# Patient Record
Sex: FEMALE | Race: BLACK OR AFRICAN AMERICAN | HISPANIC OR LATINO | Employment: FULL TIME | URBAN - METROPOLITAN AREA
[De-identification: names, ages, dates, MRNs, and addresses within clinical notes are randomized per-mention and may not be internally consistent; named-entity substitution may affect disease eponyms.]

---

## 2017-09-13 ENCOUNTER — CONVERSION ENCOUNTER (OUTPATIENT)
Dept: MAMMOGRAPHY | Facility: CLINIC | Age: 67
End: 2017-09-13

## 2018-08-27 ENCOUNTER — OFFICE VISIT (OUTPATIENT)
Dept: FAMILY MEDICINE CLINIC | Facility: CLINIC | Age: 68
End: 2018-08-27
Payer: COMMERCIAL

## 2018-08-27 VITALS
RESPIRATION RATE: 16 BRPM | TEMPERATURE: 96.6 F | DIASTOLIC BLOOD PRESSURE: 80 MMHG | WEIGHT: 167 LBS | BODY MASS INDEX: 28.51 KG/M2 | HEIGHT: 64 IN | SYSTOLIC BLOOD PRESSURE: 130 MMHG | OXYGEN SATURATION: 98 % | HEART RATE: 82 BPM

## 2018-08-27 DIAGNOSIS — I10 ESSENTIAL HYPERTENSION: Primary | ICD-10-CM

## 2018-08-27 DIAGNOSIS — E78.2 MIXED HYPERLIPIDEMIA: ICD-10-CM

## 2018-08-27 DIAGNOSIS — M85.80 OSTEOPENIA DETERMINED BY X-RAY: ICD-10-CM

## 2018-08-27 PROBLEM — E66.3 OVERWEIGHT (BMI 25.0-29.9): Status: ACTIVE | Noted: 2018-08-27

## 2018-08-27 PROCEDURE — 99213 OFFICE O/P EST LOW 20 MIN: CPT | Performed by: INTERNAL MEDICINE

## 2018-08-27 RX ORDER — HYDROCHLOROTHIAZIDE 25 MG/1
25 TABLET ORAL DAILY
Qty: 180 TABLET | Refills: 0 | Status: SHIPPED | OUTPATIENT
Start: 2018-08-27 | End: 2019-03-12 | Stop reason: SDUPTHER

## 2018-08-27 RX ORDER — ATORVASTATIN CALCIUM 20 MG/1
20 TABLET, FILM COATED ORAL DAILY
Qty: 180 TABLET | Refills: 0 | Status: SHIPPED | OUTPATIENT
Start: 2018-08-27 | End: 2019-05-02 | Stop reason: SDUPTHER

## 2018-08-27 RX ORDER — CLOPIDOGREL BISULFATE 75 MG/1
75 TABLET ORAL DAILY
Qty: 180 TABLET | Refills: 0 | Status: SHIPPED | OUTPATIENT
Start: 2018-08-27 | End: 2019-03-12 | Stop reason: SDUPTHER

## 2018-08-27 RX ORDER — LOSARTAN POTASSIUM 100 MG/1
100 TABLET ORAL DAILY
Qty: 180 TABLET | Refills: 0 | Status: SHIPPED | OUTPATIENT
Start: 2018-08-27 | End: 2019-05-02 | Stop reason: SDUPTHER

## 2018-08-27 RX ORDER — BLOOD PRESSURE TEST KIT
KIT MISCELLANEOUS
COMMUNITY
Start: 2017-01-05 | End: 2019-01-28 | Stop reason: SDUPTHER

## 2018-08-27 RX ORDER — AMLODIPINE BESYLATE 5 MG/1
5 TABLET ORAL DAILY
Qty: 180 TABLET | Refills: 0 | Status: SHIPPED | OUTPATIENT
Start: 2018-08-27 | End: 2019-01-28 | Stop reason: SDUPTHER

## 2018-08-27 NOTE — PATIENT INSTRUCTIONS
Hipertensión crónica   CUIDADO AMBULATORIO:   Hipertensión  es la presión arterial rama  La presión arterial es la fuerza que ejerce la kristian contra las ackerman de las arterias  La presión arterial normal debería estar a menos de 120/80  La pre-hipertensión estaría entre 120/80 y 139/ 80  La presión arterial rama estaría a 140/90 o más rama  La hipertensión causa que martinez presión arterial se eleve tanto que martinez corazón se ve forzado a trabajar [employer] de lo normal  [address] puede dañar martinez corazón  La hipertensión crónica es lilibeth condición de lizett plazo que usted puede controlar con un estilo de aldo jacinta o con medicamentos  La presión [place] a proteger parviz órganos tiffani martinez corazón, pulmones, cerebro, y riñones  Los síntomas más comunes incluyen los siguientes:   · Dolor de belinda     · Visión borrosa    · Dolor de pecho     · Mareos o debilidad     · Dificultad para respirar     · Hemorragias nasales (sangrado de la [place])  Llame al 911 en gabriel de presentar lo siguiente:   · Usted tiene malestar en el pecho que se siente tiffani estrujamiento, presión, [place] o dolor  · Usted se siente confundido o tiene dificultad para hablar  · Repentinamente se siente aturdido o con dificultad para respirar  · Usted tiene dolor o [employer] espalda, [place], [place], abdomen o [place]  Busque atención médica de inmediato si:   · Usted tiene un prince dolor de belinda o pérdida de la visión  · Usted tiene debilidad en un brazo o en lilibeth pierna  Pregúntele a martinez [employer] vitaminas y minerales son adecuados para usted  · Usted se siente mareado, confundido, somnoliento o tiffani si se fuera a desmayar  · Usted se ha tomado martinez medicamento para la presión arterial bridget martinez presión arterial todavía está más rama de lo que le indicó martinez médico     · Usted tiene preguntas o inquietudes acerca de martinez condición o cuidado    El tratamiento para la hipertensión crónica  puede incluir medicamentos para bajar la presión arterial y reducir el nivel de colesterol  Un nivel bajo de colesterol ayuda a prevenir enfermedades cardíacas y facilita el control de la presión arterial  La enfermedad cardíaca puede dificultar el control de la presión arterial  Es probable que usted también necesite hacer algunos cambios en martinez estilo de aldo  Tómese parviz medicamentos exactamente tiffani se lo indicaron  Controle la hipertensión crónica:  Hable con martinez médico sobre las siguientes recomendaciones y otras formas de controlar la hipertensión:  · Tómese la presión arterial en martinez casa  Siéntese y descanse por 5 minutos antes de tomarse la presión arterial  Extienda martinez brazo y apóyelo en lilibeth superficie plana  Martinez brazo debe estar a la misma altura que martinez corazón  Siga las instrucciones que vienen con el monitor para la presión arterial o tensiómetro  Si es posible tome por lo menos 2 lecturas de la presión cada vez  Tómese la presión arterial por lo [employer] al día a la misma hora todos los días, lilibeth en la mañana y la otra en la noche  Mantenga un registro de las lecturas de martinez presión arterial y llévelo consigo a parviz consultas  Pregúntele a martinez médico cuál debería ser martinez presión arterial            · Limite el sodio (la sal) tiffani se le haya indicado  Demasiado sodio puede afectar el equilibrio de líquidos  Revise las etiquetas para buscar alimentos bajos en sodio o sin sal agregada  Algunos alimentos bajos en sodio utilizan sales de potasio para añadir sabor  Demasiado potasio también puede causar problemas de [place]  Martinez médico le dirá qué cantidad de sodio y potasio es brown para el consumo en un día  Él puede recomendarle que limite el sodio a 2,300 mg al día  · Siga el plan de comidas recomendado por martinez médico   Un dietista o médico puede darle más información sobre planes de bajo contenido de sodio o el plan de alimentación DASH (enfoques dietéticos para detener la hipertensión)   El plan DASH es bajo en sodio, grasas saturadas y grasa total  Es alto en potasio, calcio y [place]  · Ejercítese para mantener un peso saludable  Realice actividad física por lo menos 30 minutos al día, la mayoría de los días de la [place]  [place] ayudará a bajar patel presión arterial  Pida más información acerca de un plan de ejercicio adecuado para usted  · [address] [facility] estrés  [place] podría ayudarlo a bajar patel presión arterial  Aprenda sobre formas de relajarse, tiffani respiración profunda o escuchar música  · Limite el consumo de alcohol  Las mujeres deberían limitar el consumo de alcohol a 1 bebida por día  Los hombres deberían limitar el consumo de alcohol a 2 tragos al día  Un trago equivale a 12 onzas de cerveza, 5 onzas de vino o 1 onza y ½ de licor  · No fume  La nicotina y otros químicos en los cigarrillos y cigarros pueden aumentar patel presión arterial y también pueden provocar daño al pulmón  Pida información a patel médico si usted actualmente fuma y necesita ayuda para dejar de fumar  Los cigarrillos electrónicos o tabaco sin humo todavía contienen nicotina  Consulte con patel médico antes de [employer]  Acuda a aprviz consultas de control con patel médico según le indicaron  Usted tendrá que regresar para que le revisen la presión arterial y para que le jenny otras pruebas de laboratorio  Anote parviz preguntas para que se acuerde de hacerlas trinidad parviz visitas  © 2017 [address] Information is for End User's use only and may not be sold, redistributed or otherwise used for commercial purposes  All illustrations and images included in CareNotes® are the copyrighted property of A D A M , Inc  or [employer] [place]  Esta información es sólo para uso en educación  Patel intención no es darle un consejo médico sobre enfermedades o tratamientos  Colsulte con patel Zelpha Roch farmacéutico antes de seguir cualquier régimen médico para saber si es seguro y efectivo para usted

## 2018-08-27 NOTE — ASSESSMENT & PLAN NOTE
Discussed increased cholesterol with patient as well as target cholesterol goal   Maintain a low-fat, low-cholesterol diet  Outlined low-fat, low-cholesterol alternatives  Weight loss can help control cholesterol levels along with diet  She reports compliance with atorvastatin

## 2018-08-27 NOTE — PROGRESS NOTES
Assessment/Plan:    Overweight (BMI 25 0-29  9)  Body mass index is 28 67 kg/m²  Counseled patient on the importance of working to achieve weight reduction goal   Discussed benefits of weight loss including prevention for control of comorbidities  Discussed role that balanced diet and daily activity play weight reduction  Setup small attainable weight loss goals  Involve family, friends, and coworkers for support  Essential hypertension  Current blood pressure is controlled at this time  Reviewed blood pressure target goal with patient  Continue to maintain healthy balanced diet with focus on low-salt intake  Limit alcohol intake  Currently controlled on amlodipine, HCTZ, and losartan  Hyperlipidemia  Discussed increased cholesterol with patient as well as target cholesterol goal   Maintain a low-fat, low-cholesterol diet  Outlined low-fat, low-cholesterol alternatives  Weight loss can help control cholesterol levels along with diet  She reports compliance with atorvastatin  Osteopenia determined by x-ray  Discussed importance of daily exercise and incorporation of light weight-bearing exercises to prevent bone loss  Strive to eat a diet rich in vitamin D as well as calcium  Supplements only if necessary if diet is unable to meet the recommended daily allowance  Diagnoses and all orders for this visit:    Essential hypertension  -     amLODIPine (NORVASC) 5 mg tablet; Take 1 tablet (5 mg total) by mouth daily for 180 days  -     atorvastatin (LIPITOR) 20 mg tablet; Take 1 tablet (20 mg total) by mouth daily for 180 days  -     losartan (COZAAR) 100 MG tablet; Take 1 tablet (100 mg total) by mouth daily for 180 days  -     clopidogrel (PLAVIX) 75 mg tablet; Take 1 tablet (75 mg total) by mouth daily for 180 days    Mixed hyperlipidemia  -     hydrochlorothiazide (HYDRODIURIL) 25 mg tablet;  Take 1 tablet (25 mg total) by mouth daily for 180 days    Osteopenia determined by x-ray    Other orders  -     Blood Pressure KIT; As directed          Subjective:      Patient ID: Bry Hall is a 79 y o  female  She presents today for follow up after changing her insurance  She needs refills on all of her medications  Last two mammograms in 2016 and 2017 were benign, will repeat 9/2019, she verbalized understanding  DEXA done showing osteopenia, reports compliance with supplements  Denies any acute concerns at this time  Immunizations are UTD  The following portions of the patient's history were reviewed and updated as appropriate: allergies, current medications, past family history, past medical history, past social history, past surgical history and problem list     Review of Systems   Constitutional: Negative for activity change, appetite change, fatigue and fever  HENT: Negative for drooling and sore throat  Eyes: Negative for pain and visual disturbance  Respiratory: Negative for cough, chest tightness and shortness of breath  Cardiovascular: Negative for chest pain and palpitations  Gastrointestinal: Negative for abdominal pain, constipation, diarrhea and nausea  Genitourinary: Negative for dysuria and hematuria  Musculoskeletal: Negative for back pain and myalgias  Skin: Negative for color change  Neurological: Negative for numbness and headaches  Psychiatric/Behavioral: Negative for hallucinations and suicidal ideas  Objective:      /80 (BP Location: Left arm, Patient Position: Sitting, Cuff Size: Adult)   Pulse 82   Temp (!) 96 6 °F (35 9 °C) (Temporal)   Resp 16   Ht 5' 4" (1 626 m)   Wt 75 8 kg (167 lb)   SpO2 98%   BMI 28 67 kg/m²          Physical Exam   Constitutional: She is oriented to person, place, and time  She appears well-developed and well-nourished  HENT:   Head: Normocephalic and atraumatic  Right Ear: External ear normal    Left Ear: External ear normal    Eyes: Pupils are equal, round, and reactive to light  Neck: Normal range of motion  Neck supple  Cardiovascular: Normal rate, regular rhythm, normal heart sounds and intact distal pulses  Pulmonary/Chest: Effort normal and breath sounds normal  No respiratory distress  Abdominal: Soft  Bowel sounds are normal  She exhibits no distension  There is no tenderness  Musculoskeletal: Normal range of motion  She exhibits no edema  Lymphadenopathy:     She has no cervical adenopathy  Neurological: She is alert and oriented to person, place, and time  Skin: Skin is warm and dry  Psychiatric: She has a normal mood and affect

## 2018-08-27 NOTE — ASSESSMENT & PLAN NOTE
Body mass index is 28 67 kg/m²  Counseled patient on the importance of working to achieve weight reduction goal   Discussed benefits of weight loss including prevention for control of comorbidities  Discussed role that balanced diet and daily activity play weight reduction  Setup small attainable weight loss goals  Involve family, friends, and coworkers for support

## 2018-08-27 NOTE — ASSESSMENT & PLAN NOTE
Current blood pressure is controlled at this time  Reviewed blood pressure target goal with patient  Continue to maintain healthy balanced diet with focus on low-salt intake  Limit alcohol intake  Currently controlled on amlodipine, HCTZ, and losartan

## 2018-08-27 NOTE — ASSESSMENT & PLAN NOTE
Discussed importance of daily exercise and incorporation of light weight-bearing exercises to prevent bone loss  Strive to eat a diet rich in vitamin D as well as calcium  Supplements only if necessary if diet is unable to meet the recommended daily allowance

## 2019-01-28 ENCOUNTER — OFFICE VISIT (OUTPATIENT)
Dept: FAMILY MEDICINE CLINIC | Facility: CLINIC | Age: 69
End: 2019-01-28

## 2019-01-28 VITALS
HEART RATE: 81 BPM | BODY MASS INDEX: 29.18 KG/M2 | OXYGEN SATURATION: 94 % | SYSTOLIC BLOOD PRESSURE: 146 MMHG | WEIGHT: 170 LBS | RESPIRATION RATE: 16 BRPM | DIASTOLIC BLOOD PRESSURE: 92 MMHG | TEMPERATURE: 97.8 F

## 2019-01-28 DIAGNOSIS — M85.80 OSTEOPENIA DETERMINED BY X-RAY: ICD-10-CM

## 2019-01-28 DIAGNOSIS — E78.2 MIXED HYPERLIPIDEMIA: ICD-10-CM

## 2019-01-28 DIAGNOSIS — I10 ESSENTIAL HYPERTENSION: ICD-10-CM

## 2019-01-28 DIAGNOSIS — Z23 NEED FOR VACCINATION: Primary | ICD-10-CM

## 2019-01-28 DIAGNOSIS — E66.3 OVERWEIGHT (BMI 25.0-29.9): ICD-10-CM

## 2019-01-28 PROCEDURE — 90471 IMMUNIZATION ADMIN: CPT | Performed by: FAMILY MEDICINE

## 2019-01-28 PROCEDURE — 90662 IIV NO PRSV INCREASED AG IM: CPT | Performed by: FAMILY MEDICINE

## 2019-01-28 PROCEDURE — 99213 OFFICE O/P EST LOW 20 MIN: CPT | Performed by: FAMILY MEDICINE

## 2019-01-28 RX ORDER — BLOOD PRESSURE TEST KIT
KIT MISCELLANEOUS
Qty: 1 EACH | Refills: 0 | Status: SHIPPED | OUTPATIENT
Start: 2019-01-28 | End: 2019-02-04 | Stop reason: SDUPTHER

## 2019-01-28 RX ORDER — AMLODIPINE BESYLATE 10 MG/1
10 TABLET ORAL DAILY
Qty: 180 TABLET | Refills: 0 | Status: SHIPPED | OUTPATIENT
Start: 2019-01-28 | End: 2019-09-23 | Stop reason: SDUPTHER

## 2019-01-28 NOTE — ASSESSMENT & PLAN NOTE
Discussed increased cholesterol with patient as well as target cholesterol goal   Maintain a low-fat, low-cholesterol diet  Outlined low-fat, low-cholesterol alternatives  Weight loss can help control cholesterol levels along with diet  She reports compliance with atorvastatin 20 mg daily  Will follow repeat lipid panel

## 2019-01-28 NOTE — ASSESSMENT & PLAN NOTE
Blood pressure still not at goal   Discussed current regimen  Will increase amlodipine to 10 mg daily at this time, patient verbalized understanding  Will write down blood pressure is when she received her blood pressure cough and bringing to clinic in her next appointment, we will return to clinic in 3 months  Will also send a BMP to evaluate kidney function

## 2019-01-28 NOTE — ASSESSMENT & PLAN NOTE
Body mass index is 29 18 kg/m²  Counseled patient on the importance of working to achieve weight reduction goal   Discussed benefits of weight loss including prevention for control of comorbidities  Discussed role that balanced diet and daily activity play weight reduction  Setup small attainable weight loss goals  Involve family, friends, and coworkers for support

## 2019-01-28 NOTE — PROGRESS NOTES
Assessment/Plan:    Overweight (BMI 25 0-29  9)  Body mass index is 29 18 kg/m²  Counseled patient on the importance of working to achieve weight reduction goal   Discussed benefits of weight loss including prevention for control of comorbidities  Discussed role that balanced diet and daily activity play weight reduction  Setup small attainable weight loss goals  Involve family, friends, and coworkers for support  Essential hypertension  Blood pressure still not at goal   Discussed current regimen  Will increase amlodipine to 10 mg daily at this time, patient verbalized understanding  Will write down blood pressure is when she received her blood pressure cough and bringing to clinic in her next appointment, we will return to clinic in 3 months  Will also send a BMP to evaluate kidney function  Hyperlipidemia  Discussed increased cholesterol with patient as well as target cholesterol goal   Maintain a low-fat, low-cholesterol diet  Outlined low-fat, low-cholesterol alternatives  Weight loss can help control cholesterol levels along with diet  She reports compliance with atorvastatin 20 mg daily  Will follow repeat lipid panel  Diagnoses and all orders for this visit:    Need for vaccination  -     PREFERRED: influenza vaccine, 5502-5695, high-dose, PF 0 5 mL, for patients 65 yr+ (FLUZONE HIGH-DOSE)    Essential hypertension  -     Blood Pressure KIT; As directed  -     amLODIPine (NORVASC) 10 mg tablet; Take 1 tablet (10 mg total) by mouth daily for 180 days  -     Basic metabolic panel; Future    Osteopenia determined by x-ray  -     Calcium Carb-Cholecalciferol 500-400 MG-UNIT CHEW; Chew 1 tablet daily    Mixed hyperlipidemia  -     Lipid panel; Future    Overweight (BMI 25 0-29  9)          Subjective:      Patient ID: Shraddha Brunner is a 76 y o  female  She presents today for follow-up of her hypertension  Blood pressure today is 146/92, goal is less than 150/90    She is not a diabetic  She is currently on amlodipine 5 mg, losartan 100mg, and hydrochlorothiazide at 25 mg  She does report compliance with all of her medications as she takes her hydrochlorothiazide in the morning and the other 2 in the evening  She did not receive her blood pressure cuff, will resend she has a friend who is a nurse who will help her take her blood pressure and teach her how to use her cough  She denies any acute concerns at this time  The following portions of the patient's history were reviewed and updated as appropriate: allergies, current medications, past family history, past medical history, past social history, past surgical history and problem list     Review of Systems   Constitutional: Negative for activity change, appetite change, fatigue and fever  HENT: Negative for drooling and sore throat  Eyes: Negative for pain and visual disturbance  Respiratory: Negative for cough, chest tightness and shortness of breath  Cardiovascular: Negative for chest pain and palpitations  Gastrointestinal: Negative for abdominal pain, constipation, diarrhea and nausea  Genitourinary: Negative for dysuria and hematuria  Musculoskeletal: Negative for back pain and myalgias  Skin: Negative for color change  Neurological: Negative for numbness and headaches  Psychiatric/Behavioral: Negative for hallucinations and suicidal ideas  Objective:      /92 (BP Location: Left arm, Patient Position: Sitting, Cuff Size: Adult)   Pulse 81   Temp 97 8 °F (36 6 °C) (Temporal)   Resp 16   Wt 77 1 kg (170 lb)   SpO2 94%   BMI 29 18 kg/m²          Physical Exam   Constitutional: She is oriented to person, place, and time  She appears well-developed and well-nourished  HENT:   Head: Normocephalic and atraumatic  Right Ear: External ear normal    Left Ear: External ear normal    Eyes: Pupils are equal, round, and reactive to light  Right eye exhibits no discharge   Left eye exhibits no discharge  No scleral icterus  Neck: Normal range of motion  Neck supple  No thyromegaly present  Cardiovascular: Normal rate, regular rhythm, normal heart sounds and intact distal pulses  Exam reveals no gallop and no friction rub  No murmur heard  Pulmonary/Chest: Effort normal and breath sounds normal  No respiratory distress  She has no wheezes  Abdominal: Soft  Bowel sounds are normal  She exhibits no distension  There is no tenderness  Musculoskeletal: Normal range of motion  She exhibits no edema  Lymphadenopathy:     She has no cervical adenopathy  Neurological: She is alert and oriented to person, place, and time  Skin: Skin is warm and dry  Psychiatric: She has a normal mood and affect  Vitals reviewed

## 2019-01-28 NOTE — PATIENT INSTRUCTIONS
Hipertensión crónica   CUIDADO AMBULATORIO:   Hipertensión  es la presión arterial rama  La presión arterial es la fuerza que ejerce la kristian contra las ackerman de las arterias  La presión arterial normal debería estar a menos de 120/80  La pre-hipertensión estaría entre 120/80 y 139/ 80  La presión arterial rama estaría a 140/90 o más rama  La hipertensión causa que martinez presión arterial se eleve tanto que martinez corazón se ve forzado a trabajar [employer] de lo normal  [place] puede dañar martinez corazón  La hipertensión crónica es lilibeth condición de lizett plazo que usted puede controlar con un estilo de aldo jacinta o con medicamentos  La presión [place] a proteger parviz órganos tiffani martinez corazón, pulmones, cerebro, y riñones  Los síntomas más comunes incluyen los siguientes:   · Dolor de belinda     · Visión borrosa    · Dolor de pecho     · Mareos o debilidad     · Dificultad para respirar     · Hemorragias nasales (sangrado de la [place])  Llame al 911 en gabriel de presentar lo siguiente:   · Usted tiene malestar en el pecho que se siente tiffani estrujamiento, presión, [place] o dolor  · Usted se siente confundido o tiene dificultad para hablar  · Repentinamente se siente aturdido o con dificultad para respirar  · Usted tiene dolor o [employer] espalda, [place], [place], abdomen o [place]  Busque atención médica de inmediato si:   · Usted tiene un prince dolor de belinda o pérdida de la visión  · Usted tiene debilidad en un brazo o en lilibeth pierna  Pregúntele a martinez [place] vitaminas y minerales son adecuados para usted  · Usted se siente mareado, confundido, somnoliento o tiffani si se fuera a desmayar  · Usted se ha tomado martinez medicamento para la presión arterial bridget martinez presión arterial todavía está más rama de lo que le indicó martinez médico     · Usted tiene preguntas o inquietudes acerca de martinez condición o cuidado    El tratamiento para la hipertensión crónica  puede incluir medicamentos para bajar la presión arterial y reducir el nivel de colesterol  Un nivel bajo de colesterol ayuda a prevenir enfermedades cardíacas y facilita el control de la presión arterial  La enfermedad cardíaca puede dificultar el control de la presión arterial  Es probable que usted también necesite hacer algunos cambios en martinez estilo de aldo  Tómese parviz medicamentos exactamente tiffani se lo indicaron  Controle la hipertensión crónica:  Hable con martinez médico sobre las siguientes recomendaciones y otras formas de controlar la hipertensión:  · Tómese la presión arterial en martinez casa  Siéntese y descanse por 5 minutos antes de tomarse la presión arterial  Extienda martinez brazo y apóyelo en lilibeth superficie plana  Martinez brazo debe estar a la misma altura que martinez corazón  Siga las instrucciones que vienen con el monitor para la presión arterial o tensiómetro  Si es posible tome por lo menos 2 lecturas de la presión cada vez  Tómese la presión arterial por lo [employer] al día a la misma hora todos los días, lilibeth en la mañana y la otra en la noche  Mantenga un registro de las lecturas de martinez presión arterial y llévelo consigo a parviz consultas  Pregúntele a martinez médico cuál debería ser martinez presión arterial            · Limite el sodio (la sal) tiffani se le haya indicado  Demasiado sodio puede afectar el equilibrio de líquidos  Revise las etiquetas para buscar alimentos bajos en sodio o sin sal agregada  Algunos alimentos bajos en sodio utilizan sales de potasio para añadir sabor  Demasiado potasio también puede causar problemas de [place]  Martinez médico le dirá qué cantidad de sodio y potasio es brown para el consumo en un día  Él puede recomendarle que limite el sodio a 2,300 mg al día  · Siga el plan de comidas recomendado por martinez médico   Un dietista o médico puede darle más información sobre planes de bajo contenido de sodio o el plan de alimentación DASH (enfoques dietéticos para detener la hipertensión)   El plan DASH es bajo en sodio, grasas saturadas y grasa total  Es alto en potasio, calcio y [place]  · Ejercítese para mantener un peso saludable  Realice actividad física por lo menos 30 minutos al día, la mayoría de los días de la [place]  [place] ayudará a bajar patel presión arterial  Pida más información acerca de un plan de ejercicio adecuado para usted  · [address] [facility] estrés  [place] podría ayudarlo a bajar patel presión arterial  Aprenda sobre formas de relajarse, tiffani respiración profunda o escuchar música  · Limite el consumo de alcohol  Las mujeres deberían limitar el consumo de alcohol a 1 bebida por día  Los hombres deberían limitar el consumo de alcohol a 2 tragos al día  Un trago equivale a 12 onzas de cerveza, 5 onzas de vino o 1 onza y ½ de licor  · No fume  La nicotina y otros químicos en los cigarrillos y cigarros pueden aumentar patel presión arterial y también pueden provocar daño al pulmón  Pida información a patel médico si usted actualmente fuma y necesita ayuda para dejar de fumar  Los cigarrillos electrónicos o tabaco sin humo todavía contienen nicotina  Consulte con patel médico antes de [employer]  Acuda a parviz consultas de control con patel médico según le indicaron  Usted tendrá que regresar para que le revisen la presión arterial y para que le jenny otras pruebas de laboratorio  Anote parviz preguntas para que se acuerde de hacerlas trinidad parviz visitas  © 2017 [address] Information is for End User's use only and may not be sold, redistributed or otherwise used for commercial purposes  All illustrations and images included in CareNotes® are the copyrighted property of A D A M , Inc  or [employer] [place]  Esta información es sólo para uso en educación  Patel intención no es darle un consejo médico sobre enfermedades o tratamientos  Colsulte con paetl [place] farmacéutico antes de seguir cualquier régimen médico para saber si es seguro y efectivo para usted

## 2019-02-04 DIAGNOSIS — I10 ESSENTIAL HYPERTENSION: ICD-10-CM

## 2019-02-04 RX ORDER — BLOOD PRESSURE TEST KIT
KIT MISCELLANEOUS
Qty: 1 EACH | Refills: 0 | Status: SHIPPED | OUTPATIENT
Start: 2019-02-04

## 2019-03-12 DIAGNOSIS — E78.2 MIXED HYPERLIPIDEMIA: ICD-10-CM

## 2019-03-12 DIAGNOSIS — I10 ESSENTIAL HYPERTENSION: ICD-10-CM

## 2019-03-13 RX ORDER — HYDROCHLOROTHIAZIDE 25 MG/1
TABLET ORAL
Qty: 90 TABLET | Refills: 0 | Status: SHIPPED | OUTPATIENT
Start: 2019-03-13 | End: 2019-06-28 | Stop reason: SDUPTHER

## 2019-03-13 RX ORDER — CLOPIDOGREL BISULFATE 75 MG/1
TABLET ORAL
Qty: 90 TABLET | Refills: 0 | Status: SHIPPED | OUTPATIENT
Start: 2019-03-13 | End: 2019-06-28 | Stop reason: SDUPTHER

## 2019-03-22 ENCOUNTER — TELEPHONE (OUTPATIENT)
Dept: FAMILY MEDICINE CLINIC | Facility: CLINIC | Age: 69
End: 2019-03-22

## 2019-03-22 DIAGNOSIS — M85.80 OSTEOPENIA DETERMINED BY X-RAY: ICD-10-CM

## 2019-03-22 NOTE — TELEPHONE ENCOUNTER
PT came into office stating ins wont cover current calcium chew tab but if script for  600-400 tablet it will be covered

## 2019-04-15 ENCOUNTER — APPOINTMENT (OUTPATIENT)
Dept: LAB | Facility: HOSPITAL | Age: 69
End: 2019-04-15
Payer: COMMERCIAL

## 2019-04-15 DIAGNOSIS — I10 ESSENTIAL HYPERTENSION: ICD-10-CM

## 2019-04-15 DIAGNOSIS — E78.2 MIXED HYPERLIPIDEMIA: ICD-10-CM

## 2019-04-15 LAB
ANION GAP SERPL CALCULATED.3IONS-SCNC: 6 MMOL/L (ref 5–14)
BUN SERPL-MCNC: 15 MG/DL (ref 5–25)
CALCIUM SERPL-MCNC: 9.5 MG/DL (ref 8.4–10.2)
CHLORIDE SERPL-SCNC: 104 MMOL/L (ref 97–108)
CHOLEST SERPL-MCNC: 143 MG/DL
CO2 SERPL-SCNC: 32 MMOL/L (ref 22–30)
CREAT SERPL-MCNC: 0.66 MG/DL (ref 0.6–1.2)
GFR SERPL CREATININE-BSD FRML MDRD: 91 ML/MIN/1.73SQ M
GLUCOSE P FAST SERPL-MCNC: 101 MG/DL (ref 70–99)
HDLC SERPL-MCNC: 47 MG/DL (ref 40–59)
LDLC SERPL CALC-MCNC: 77 MG/DL
NONHDLC SERPL-MCNC: 96 MG/DL
POTASSIUM SERPL-SCNC: 3.5 MMOL/L (ref 3.6–5)
SODIUM SERPL-SCNC: 142 MMOL/L (ref 137–147)
TRIGL SERPL-MCNC: 94 MG/DL

## 2019-04-15 PROCEDURE — 80061 LIPID PANEL: CPT

## 2019-04-15 PROCEDURE — 36415 COLL VENOUS BLD VENIPUNCTURE: CPT

## 2019-04-15 PROCEDURE — 80048 BASIC METABOLIC PNL TOTAL CA: CPT

## 2019-05-02 ENCOUNTER — OFFICE VISIT (OUTPATIENT)
Dept: FAMILY MEDICINE CLINIC | Facility: CLINIC | Age: 69
End: 2019-05-02

## 2019-05-02 ENCOUNTER — TELEPHONE (OUTPATIENT)
Dept: FAMILY MEDICINE CLINIC | Facility: CLINIC | Age: 69
End: 2019-05-02

## 2019-05-02 VITALS
WEIGHT: 171.25 LBS | DIASTOLIC BLOOD PRESSURE: 72 MMHG | HEIGHT: 64 IN | BODY MASS INDEX: 29.24 KG/M2 | SYSTOLIC BLOOD PRESSURE: 130 MMHG | RESPIRATION RATE: 18 BRPM | HEART RATE: 86 BPM | TEMPERATURE: 97.5 F | OXYGEN SATURATION: 99 %

## 2019-05-02 DIAGNOSIS — I10 ESSENTIAL HYPERTENSION: Primary | ICD-10-CM

## 2019-05-02 DIAGNOSIS — Z12.39 BREAST CANCER SCREENING: ICD-10-CM

## 2019-05-02 DIAGNOSIS — H91.92 DECREASED HEARING, LEFT: ICD-10-CM

## 2019-05-02 DIAGNOSIS — Z12.11 COLON CANCER SCREENING: ICD-10-CM

## 2019-05-02 DIAGNOSIS — E78.2 MIXED HYPERLIPIDEMIA: ICD-10-CM

## 2019-05-02 PROCEDURE — 99213 OFFICE O/P EST LOW 20 MIN: CPT | Performed by: FAMILY MEDICINE

## 2019-05-02 RX ORDER — ATORVASTATIN CALCIUM 20 MG/1
20 TABLET, FILM COATED ORAL DAILY
Qty: 180 TABLET | Refills: 0 | Status: SHIPPED | OUTPATIENT
Start: 2019-05-02 | End: 2019-12-27 | Stop reason: SDUPTHER

## 2019-05-02 RX ORDER — LOSARTAN POTASSIUM 100 MG/1
100 TABLET ORAL DAILY
Qty: 180 TABLET | Refills: 0 | Status: SHIPPED | OUTPATIENT
Start: 2019-05-02 | End: 2019-09-23 | Stop reason: SDUPTHER

## 2019-06-20 ENCOUNTER — OFFICE VISIT (OUTPATIENT)
Dept: OTOLARYNGOLOGY | Facility: CLINIC | Age: 69
End: 2019-06-20
Payer: COMMERCIAL

## 2019-06-20 ENCOUNTER — OFFICE VISIT (OUTPATIENT)
Dept: AUDIOLOGY | Facility: CLINIC | Age: 69
End: 2019-06-20
Payer: COMMERCIAL

## 2019-06-20 VITALS
WEIGHT: 168.2 LBS | BODY MASS INDEX: 30.95 KG/M2 | SYSTOLIC BLOOD PRESSURE: 117 MMHG | RESPIRATION RATE: 17 BRPM | HEIGHT: 62 IN | HEART RATE: 70 BPM | DIASTOLIC BLOOD PRESSURE: 68 MMHG

## 2019-06-20 DIAGNOSIS — H90.3 SENSORINEURAL HEARING LOSS, BILATERAL: Primary | ICD-10-CM

## 2019-06-20 DIAGNOSIS — H90.3 SENSORINEURAL HEARING LOSS (SNHL) OF BOTH EARS: Primary | ICD-10-CM

## 2019-06-20 PROCEDURE — 92567 TYMPANOMETRY: CPT | Performed by: AUDIOLOGIST

## 2019-06-20 PROCEDURE — 99203 OFFICE O/P NEW LOW 30 MIN: CPT | Performed by: SPECIALIST

## 2019-06-20 PROCEDURE — 92557 COMPREHENSIVE HEARING TEST: CPT | Performed by: AUDIOLOGIST

## 2019-06-24 ENCOUNTER — HOSPITAL ENCOUNTER (OUTPATIENT)
Dept: MAMMOGRAPHY | Facility: CLINIC | Age: 69
Discharge: HOME | End: 2019-06-24
Payer: COMMERCIAL

## 2019-06-24 VITALS — BODY MASS INDEX: 30.91 KG/M2 | WEIGHT: 168 LBS | HEIGHT: 62 IN

## 2019-06-24 DIAGNOSIS — I10 ESSENTIAL HYPERTENSION: ICD-10-CM

## 2019-06-24 DIAGNOSIS — E78.2 MIXED HYPERLIPIDEMIA: ICD-10-CM

## 2019-06-24 DIAGNOSIS — Z12.39 BREAST CANCER SCREENING: ICD-10-CM

## 2019-06-24 PROCEDURE — 77067 SCR MAMMO BI INCL CAD: CPT

## 2019-06-27 RX ORDER — HYDROCHLOROTHIAZIDE 25 MG/1
TABLET ORAL
Qty: 90 TABLET | Refills: 0 | OUTPATIENT
Start: 2019-06-27

## 2019-06-27 RX ORDER — CLOPIDOGREL BISULFATE 75 MG/1
TABLET ORAL
Qty: 90 TABLET | Refills: 0 | OUTPATIENT
Start: 2019-06-27

## 2019-06-28 DIAGNOSIS — I10 ESSENTIAL HYPERTENSION: ICD-10-CM

## 2019-06-28 DIAGNOSIS — E78.2 MIXED HYPERLIPIDEMIA: ICD-10-CM

## 2019-07-01 RX ORDER — CLOPIDOGREL BISULFATE 75 MG/1
TABLET ORAL
Qty: 90 TABLET | Refills: 0 | Status: SHIPPED | OUTPATIENT
Start: 2019-07-01 | End: 2019-09-04

## 2019-07-01 RX ORDER — HYDROCHLOROTHIAZIDE 25 MG/1
TABLET ORAL
Qty: 90 TABLET | Refills: 0 | Status: SHIPPED | OUTPATIENT
Start: 2019-07-01 | End: 2019-09-23 | Stop reason: SDUPTHER

## 2019-07-01 NOTE — TELEPHONE ENCOUNTER
Pt came in requesting status of medication refills    hydrochlorothiazide (HYDRODIURIL) 25 mg tablet   clopidogrel (PLAVIX) 75 mg tablet     I see it was sent to a pcp that isn't here anymore     Please advise

## 2019-07-01 NOTE — TELEPHONE ENCOUNTER
Please schedule Kyrgyz speaking pt an appt with new pod 3 PCP in August and we can route refill to that provider   Thanks

## 2019-08-15 ENCOUNTER — OFFICE VISIT (OUTPATIENT)
Dept: FAMILY MEDICINE CLINIC | Facility: CLINIC | Age: 69
End: 2019-08-15

## 2019-08-15 ENCOUNTER — TELEPHONE (OUTPATIENT)
Dept: FAMILY MEDICINE CLINIC | Facility: CLINIC | Age: 69
End: 2019-08-15

## 2019-08-15 VITALS
DIASTOLIC BLOOD PRESSURE: 70 MMHG | HEART RATE: 75 BPM | WEIGHT: 167 LBS | TEMPERATURE: 97.6 F | BODY MASS INDEX: 30.54 KG/M2 | SYSTOLIC BLOOD PRESSURE: 128 MMHG | RESPIRATION RATE: 17 BRPM | OXYGEN SATURATION: 97 %

## 2019-08-15 DIAGNOSIS — I83.813 VARICOSE VEINS OF BOTH LOWER EXTREMITIES WITH PAIN: ICD-10-CM

## 2019-08-15 DIAGNOSIS — I10 ESSENTIAL HYPERTENSION: Primary | ICD-10-CM

## 2019-08-15 PROBLEM — I83.93 VARICOSE VEINS OF BOTH LOWER EXTREMITIES: Status: ACTIVE | Noted: 2019-08-15

## 2019-08-15 PROCEDURE — 99213 OFFICE O/P EST LOW 20 MIN: CPT | Performed by: INTERNAL MEDICINE

## 2019-08-15 NOTE — ASSESSMENT & PLAN NOTE
BP Readings from Last 1 Encounters:   08/15/19 128/70   Currently controlled  Continue current management  Reassess at next visit

## 2019-08-15 NOTE — TELEPHONE ENCOUNTER
Patient having surgery for (R) eye with [facility] on 09/16/2019  Dr Simon Beltre you have no openings for pre op until 09/19/2019  Patient states you had said you would be able to see her before her surgery date

## 2019-08-15 NOTE — PATIENT INSTRUCTIONS
Hipertensión   LO QUE NECESITA SABER:   La hipertensión es la presión arterial uday  La presión arterial es la fuerza que ejerce la kristian contra las ackerman de las arterias  La presión arterial normal debería estar a menos de 120/80  La pre-hipertensión estaría entre 120/80 y 139/ 80  La presión arterial uday estaría a 140/90 o más uday  La hipertensión causa que martinez presión arterial se eleve tanto que martinez corazón se ve forzado a trabajar [employer] de lo normal  [place] puede dañar martinez corazón  Puede controlar la hipertensión con un estilo de aldo saludable o con medicamentos  La presión [place] a proteger parviz órganos tiffani martinez corazón, pulmones, cerebro, y riñones  INSTRUCCIONES SOBRE EL [facility]:   Llame al 911 en gabriel de presentar lo siguiente:   · Usted tiene malestar en el pecho que se siente tiffani estrujamiento, presión, Zia Leslie o dolor  · Usted se siente confundido o tiene dificultad para hablar  · Repentinamente se siente aturdido o con dificultad para respirar  · Usted tiene dolor o [employer] espalda, [place], [place], abdomen o Corinna Sailors  Regrese a la denae de emergencias si:   · Usted tiene un prince dolor de belinda o pérdida de la visión  · Usted tiene debilidad en un brazo o en lilibeth pierna  Pregúntele a martinez [place] vitaminas y minerales son adecuados para usted  · Usted se siente mareado, confundido, somnoliento o tiffani si se fuera a desmayar  · Usted se ha tomado martinez medicamento para la presión arterial bridget martinez presión arterial todavía está más uday de lo que le indicó martinez médico     · Usted tiene preguntas o inquietudes acerca de martinez condición o cuidado  Medicamentos:  Es posible que usted necesite alguno de los siguientes:  · Medicamento  podría usarse para ayudar a disminuir la presión arterial  Es posible que necesite más de un tipo de [place]  [place] el medicamento exactamente tiffani indicado       · Diuréticos  ayudan a eliminar el exceso de líquido que se acumula en el organismo  [place] contribuirá a bajar martinez presión arterial  Es posible que orine más seguido mientras rosetta ramsey medicamento  · Los medicamentos para el colesterol  ayudan a bajar los niveles de [place]  Un nivel bajo de colesterol ayuda a prevenir enfermedades cardíacas y facilita el control de la presión arterial      · [place] parviz medicamentos tiffani se le haya indicado  Consulte con martinez médico si usted rosenda que martinez medicamento no le está ayudando o si presenta efectos secundarios  Infórmele si es alérgico a cualquier medicamento  Mantenga lilibeth lista actualizada de los [place], las vitaminas y los productos herbales que rosetta  Incluya los siguientes datos de los medicamentos: cantidad, frecuencia y motivo de administración  Traiga con usted la lista o los envases de la píldoras a parviz citas de seguimiento  Lleve la lista de los medicamentos con usted en gabriel de lilibeth emergencia  Acuda a parviz consultas de control con martinez médico según le indicaron  Usted tendrá que regresar para que le revisen la presión arterial y para que le jenny otras pruebas de laboratorio  Anote parviz preguntas para que se acuerde de hacerlas trinidad parviz visitas  Maneje martinez hipertensión:  Hable con martinez médico sobre las siguientes recomendaciones y otras formas de controlar la hipertensión:  · Revise martinez presión arterial en casa  Siéntese y descanse por 5 minutos antes de tomarse la presión arterial  Extienda martinez brazo y apóyelo en lilibeth superficie plana  Martinez brazo debe estar a la misma altura que martinez corazón  Siga las instrucciones que vienen con el monitor para la presión arterial o tensiómetro  Si es posible tome por lo menos 2 lecturas de la presión cada vez  Tómese la presión arterial por lo [employer] al día a la misma hora todos los días, lilibeth en la mañana y la otra en la noche  Mantenga un registro de las lecturas de martinez presión arterial y llévelo consigo a parviz consultas   Pregúntele a martinez médico cuál debe ser martinez presión arterial            · Limite el sodio (la sal) tiffani se le haya indicado  Demasiado sodio puede afectar el equilibrio de líquidos  Revise las etiquetas para buscar alimentos bajos en sodio o sin sal agregada  Algunos alimentos bajos en sodio utilizan sales de potasio para añadir sabor  Demasiado potasio también puede causar problemas de [place]  Martinez médico le dirá qué cantidad de sodio y potasio es brown para el consumo en un día  Él puede recomendarle que limite el sodio a 2,300 mg al día  · Siga el plan de comidas recomendado por martinez médico   Un dietista o médico puede darle más información sobre planes de bajo contenido de sodio o el plan de alimentación DASH (enfoques dietéticos para detener la hipertensión)  El plan DASH es bajo en sodio, grasas saturadas y grasa total  Es alto en potasio, calcio y [place]  · Ejercítese para mantener un peso saludable  Realice actividad física por lo menos 30 minutos al día, la mayoría de los días de la [place]  [place] ayudará a bajar martinez presión arterial  Pregunte a martinez médico acerca del mejor plan de ejercicio para usted  · [address] [place] [address]  [place] podría ayudarlo a bajar martinez presión arterial  Aprenda sobre formas de relajarse, tiffani respiración profunda o escuchar música  · Limite el consumo de alcohol  Las mujeres deberían limitar el consumo de alcohol a 1 bebida por día  Los hombres deberían limitar el consumo de alcohol a 2 tragos al día  Un trago equivale a 12 onzas de cerveza, 5 onzas de vino o 1 onza y ½ de licor  · No fume  La nicotina y otros químicos en los cigarrillos y cigarros pueden aumentar martinez presión arterial y también pueden provocar daño al pulmón  Pida información a martinez médico si usted actualmente fuma y necesita ayuda para dejar de fumar  Los cigarrillos electrónicos o tabaco sin humo todavía contienen nicotina  Consulte con martinez médico antes de [employer]  · Controle cualquier otra condición médica que usted tenga  Algunas condiciones médicas tiffani la diabetes pueden aumentar patel riesgo de hipertensión  [address] patel médico y tómese parviz medicamentos según dichas instrucciones  © 2017 [address] Information is for End User's use only and may not be sold, redistributed or otherwise used for commercial purposes  All illustrations and images included in CareNotes® are the copyrighted property of A JORDAN A M , Inc  or Zenon San  Esta información es sólo para uso en educación  Patel intención no es darle un consejo médico sobre enfermedades o tratamientos  Colsulte con patel Trevor Stewardson farmacéutico antes de seguir cualquier régimen médico para saber si es seguro y efectivo para usted

## 2019-08-15 NOTE — PROGRESS NOTES
Assessment/Plan:    Essential hypertension  BP Readings from Last 1 Encounters:   08/15/19 128/70   Currently controlled  Continue current management  Reassess at next visit  Varicose veins of both lower extremities  Counseled on elevation  Will send compression stockings  Diagnoses and all orders for this visit:    Essential hypertension    Varicose veins of both lower extremities with pain  -     Compression Stocking          Subjective:      Patient ID: Shraddha Atkins is a 76 y o  female  Who presents for follow up on their chronic conditions  Complains of swelling in BL lower extremities after work  Patient Active Problem List:     Essential hypertension, well controlled, adherent to medications  Denies headache, changes in vision, chest pain, nausea vomiting, urinary complaints  Varicose veins of both lower extremities,  Complaining of painful swelling at the end a long day patient states that 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, has multiple varicose veins in the medial aspect the knee, no swelling on exam today  Patient has not used compression stockings in the past but is interested and agrees to trial           The following portions of the patient's history were reviewed and updated as appropriate: allergies, current medications, past family history, past medical history, past social history, past surgical history and problem list     Review of Systems   Constitutional: Negative for chills and fever  HENT: Negative for ear pain and sore throat  Eyes: Negative for pain and redness  Respiratory: Negative for cough and shortness of breath  Cardiovascular: Positive for leg swelling  Negative for chest pain and palpitations  Gastrointestinal: Negative for abdominal pain, diarrhea and nausea  Genitourinary: Negative for dysuria and hematuria  Musculoskeletal: Negative for back pain and neck pain     Neurological: Negative for dizziness and headaches  Psychiatric/Behavioral: Negative for dysphoric mood  The patient is not nervous/anxious  Objective:      /70 (BP Location: Left arm, Patient Position: Sitting, Cuff Size: Adult)   Pulse 75   Temp 97 6 °F (36 4 °C) (Temporal)   Resp 17   Wt 75 8 kg (167 lb)   SpO2 97%   BMI 30 54 kg/m²          Physical Exam   Constitutional: She is oriented to person, place, and time  She appears well-developed and well-nourished  HENT:   Head: Normocephalic and atraumatic  Right Ear: External ear normal    Left Ear: External ear normal    Nose: Nose normal    Mouth/Throat: Oropharynx is clear and moist    Eyes: Pupils are equal, round, and reactive to light  Conjunctivae and EOM are normal    Neck: Normal range of motion  Neck supple  Cardiovascular: Normal rate, regular rhythm, normal heart sounds and intact distal pulses  Multiple varicose veins on BLLE   Pulmonary/Chest: Effort normal and breath sounds normal    Abdominal: Soft  Bowel sounds are normal  There is no tenderness  Musculoskeletal: Normal range of motion  She exhibits no edema or tenderness  Lymphadenopathy:     She has no cervical adenopathy  Neurological: She is alert and oriented to person, place, and time  Skin: Skin is warm and dry  Psychiatric: She has a normal mood and affect   Her behavior is normal

## 2019-09-04 ENCOUNTER — CONSULT (OUTPATIENT)
Dept: FAMILY MEDICINE CLINIC | Facility: CLINIC | Age: 69
End: 2019-09-04

## 2019-09-04 VITALS
SYSTOLIC BLOOD PRESSURE: 124 MMHG | BODY MASS INDEX: 30.18 KG/M2 | RESPIRATION RATE: 18 BRPM | DIASTOLIC BLOOD PRESSURE: 72 MMHG | TEMPERATURE: 97.6 F | WEIGHT: 164 LBS | HEART RATE: 78 BPM | HEIGHT: 62 IN | OXYGEN SATURATION: 98 %

## 2019-09-04 DIAGNOSIS — Z01.810 PREOPERATIVE CARDIOVASCULAR EXAMINATION: Primary | ICD-10-CM

## 2019-09-04 PROCEDURE — 3725F SCREEN DEPRESSION PERFORMED: CPT | Performed by: FAMILY MEDICINE

## 2019-09-04 PROCEDURE — 99213 OFFICE O/P EST LOW 20 MIN: CPT | Performed by: FAMILY MEDICINE

## 2019-09-04 NOTE — PROGRESS NOTES
Assessment/Plan:    Preoperative cardiovascular examination  The patient is scheduled to have cataract surgery on 09/16/2019 for the right eye and 09/30/2019 for the left eye  Patient has significant history of hypertension which is very well controlled at this time on losartan 100 mg, hydrochlorothiazide 25 mg and amlodipine 10 mg  Patient denies having any previous strokes, artery problems, stents, peripheral arterial disease  Patient states that she was placed on Plavix several years ago and is unsure why  Will discontinue at this time as there is no indication for patient to be on Plavix  Patient also has hyperlipidemia and is on atorvastatin  There are no diagnoses linked to this encounter  Subjective:      Patient ID: Shraddha Zuñiga is a 76 y o  female  This is a very pleasant 70-year-old female who presents to the clinic for a preop physical   Patient has significant past medical history of hypertension and hyperlipidemia  Hypertension is very well controlled this time and is on appropriate medications for hyper lipidemia  Patient scheduled for cataract surgery of the right eye at on 09/16/2019 in the left eye on 09/30/2019  Patient has no complaints at this time  Patient is a medium risk for a low risk procedure  Patient is exclusively Estonian-speaking,  services were required for this exam  [facility] services were used  Patient verbalizes understanding of assessment and agrees with the plan  The following portions of the patient's history were reviewed and updated as appropriate: allergies, current medications, past family history, past medical history, past social history, past surgical history and problem list     Review of Systems   Constitutional: Negative for chills and fever  HENT: Negative for congestion and sore throat  Eyes: Negative for visual disturbance  Respiratory: Negative for wheezing  Cardiovascular: Negative for chest pain  Gastrointestinal: Negative for abdominal pain, blood in stool and nausea  Endocrine: Negative for cold intolerance and heat intolerance  Genitourinary: Negative for dysuria and hematuria  Musculoskeletal: Negative for gait problem  Skin: Negative for rash  Allergic/Immunologic: Negative for environmental allergies  Neurological: Negative for syncope  Hematological: Does not bruise/bleed easily  Psychiatric/Behavioral: Negative for behavioral problems  Objective:      /72 (BP Location: Right arm, Patient Position: Sitting, Cuff Size: Standard)   Pulse 78   Temp 97 6 °F (36 4 °C) (Temporal)   Resp 18   Ht 5' 2" (1 575 m)   Wt 74 4 kg (164 lb)   SpO2 98%   BMI 30 00 kg/m²          Physical Exam   Constitutional: She is oriented to person, place, and time  She appears well-developed and well-nourished  No distress  HENT:   Head: Normocephalic and atraumatic  Right Ear: External ear normal    Left Ear: External ear normal    Nose: Nose normal    Mouth/Throat: Oropharynx is clear and moist    Eyes: Pupils are equal, round, and reactive to light  Conjunctivae and EOM are normal  Right eye exhibits no discharge  Left eye exhibits no discharge  Neck: Normal range of motion  Neck supple  No JVD present  No tracheal deviation present  No thyromegaly present  Cardiovascular: Normal rate, regular rhythm, normal heart sounds and intact distal pulses  Exam reveals no gallop and no friction rub  No murmur heard  Pulmonary/Chest: Effort normal and breath sounds normal  No respiratory distress  She has no wheezes  She exhibits no tenderness  Abdominal: Soft  Bowel sounds are normal  She exhibits no distension  There is no tenderness  There is no rebound  Musculoskeletal: Normal range of motion  She exhibits no edema or tenderness  Neurological: She is alert and oriented to person, place, and time  She has normal reflexes  Coordination normal    Skin: Skin is warm and dry   No rash noted  She is not diaphoretic  No erythema  Psychiatric: She has a normal mood and affect  Her behavior is normal  Thought content normal    Nursing note and vitals reviewed

## 2019-09-04 NOTE — ASSESSMENT & PLAN NOTE
The patient is scheduled to have cataract surgery on 09/16/2019 for the right eye and 09/30/2019 for the left eye  Patient has significant history of hypertension which is very well controlled at this time on losartan 100 mg, hydrochlorothiazide 25 mg and amlodipine 10 mg  Patient denies having any previous strokes, artery problems, stents, peripheral arterial disease  Patient states that she was placed on Plavix several years ago and is unsure why  Will discontinue at this time as there is no indication for patient to be on Plavix  Patient also has hyperlipidemia and is on atorvastatin

## 2019-09-23 DIAGNOSIS — I10 ESSENTIAL HYPERTENSION: ICD-10-CM

## 2019-09-23 DIAGNOSIS — E78.2 MIXED HYPERLIPIDEMIA: ICD-10-CM

## 2019-09-23 RX ORDER — AMLODIPINE BESYLATE 10 MG/1
TABLET ORAL
Qty: 30 TABLET | Refills: 0 | Status: SHIPPED | OUTPATIENT
Start: 2019-09-23 | End: 2019-11-06 | Stop reason: SDUPTHER

## 2019-09-23 RX ORDER — LOSARTAN POTASSIUM 100 MG/1
TABLET ORAL
Qty: 90 TABLET | Refills: 0 | Status: SHIPPED | OUTPATIENT
Start: 2019-09-23 | End: 2019-12-27 | Stop reason: SDUPTHER

## 2019-09-23 RX ORDER — HYDROCHLOROTHIAZIDE 25 MG/1
TABLET ORAL
Qty: 90 TABLET | Refills: 0 | Status: SHIPPED | OUTPATIENT
Start: 2019-09-23 | End: 2019-12-27 | Stop reason: SDUPTHER

## 2019-10-17 ENCOUNTER — OFFICE VISIT (OUTPATIENT)
Dept: FAMILY MEDICINE CLINIC | Facility: CLINIC | Age: 69
End: 2019-10-17

## 2019-10-17 VITALS
WEIGHT: 163 LBS | RESPIRATION RATE: 20 BRPM | TEMPERATURE: 96.3 F | HEART RATE: 78 BPM | HEIGHT: 62 IN | DIASTOLIC BLOOD PRESSURE: 80 MMHG | SYSTOLIC BLOOD PRESSURE: 120 MMHG | OXYGEN SATURATION: 99 % | BODY MASS INDEX: 30 KG/M2

## 2019-10-17 DIAGNOSIS — I10 ESSENTIAL HYPERTENSION: Primary | ICD-10-CM

## 2019-10-17 DIAGNOSIS — Z12.11 SCREENING FOR COLON CANCER: ICD-10-CM

## 2019-10-17 PROCEDURE — 3008F BODY MASS INDEX DOCD: CPT | Performed by: FAMILY MEDICINE

## 2019-10-17 PROCEDURE — 99213 OFFICE O/P EST LOW 20 MIN: CPT | Performed by: FAMILY MEDICINE

## 2019-10-17 PROCEDURE — 3079F DIAST BP 80-89 MM HG: CPT | Performed by: FAMILY MEDICINE

## 2019-10-17 PROCEDURE — 1160F RVW MEDS BY RX/DR IN RCRD: CPT | Performed by: FAMILY MEDICINE

## 2019-10-17 PROCEDURE — 3074F SYST BP LT 130 MM HG: CPT | Performed by: FAMILY MEDICINE

## 2019-10-17 NOTE — PROGRESS NOTES
Assessment/Plan:    Essential hypertension  BP Readings from Last 1 Encounters:   10/17/19 120/80   Currently controlled  Continue current management  Reassess at next visit  Screening for colon cancer  Referral sent to GI       Diagnoses and all orders for this visit:    Essential hypertension  -     CBC and differential; Future  -     Comprehensive metabolic panel; Future    Screening for colon cancer  -     Ambulatory referral to Gastroenterology; Future          Subjective:      Patient ID: Shraddha Lindquist is a 71 y o  female  Who presents for follow up on their chronic conditions  No complaints  Patient Active Problem List:     Essential hypertension, well controlled, adherent to medications  Screening for colon cancer, patient received letter from insurance that she is due for colonoscopy, last colonoscopy in [place] in 2003, pateint reports as normal           The following portions of the patient's history were reviewed and updated as appropriate: allergies, current medications, past family history, past medical history, past social history, past surgical history and problem list     Review of Systems   Constitutional: Negative for chills and fever  HENT: Negative for ear pain and sore throat  Eyes: Negative for pain and redness  Respiratory: Negative for cough and shortness of breath  Cardiovascular: Negative for chest pain, palpitations and leg swelling  Gastrointestinal: Negative for abdominal pain, diarrhea and nausea  Genitourinary: Negative for dysuria and hematuria  Musculoskeletal: Negative for back pain and neck pain  Neurological: Negative for dizziness and headaches  Psychiatric/Behavioral: Negative for dysphoric mood  The patient is not nervous/anxious            Objective:      /80   Pulse 78   Temp (!) 96 3 °F (35 7 °C) (Skin)   Resp 20   Ht 5' 2" (1 575 m)   Wt 73 9 kg (163 lb)   SpO2 99%   BMI 29 81 kg/m²          Physical Exam   Constitutional: She is oriented to person, place, and time  She appears well-developed and well-nourished  HENT:   Head: Normocephalic and atraumatic  Right Ear: External ear normal    Left Ear: External ear normal    Nose: Nose normal    Mouth/Throat: Oropharynx is clear and moist    Eyes: Pupils are equal, round, and reactive to light  Conjunctivae and EOM are normal    Neck: Normal range of motion  Neck supple  Cardiovascular: Normal rate, regular rhythm, normal heart sounds and intact distal pulses  Pulmonary/Chest: Effort normal and breath sounds normal    Abdominal: Soft  Bowel sounds are normal  There is no tenderness  Musculoskeletal: Normal range of motion  She exhibits no edema or tenderness  Lymphadenopathy:     She has no cervical adenopathy  Neurological: She is alert and oriented to person, place, and time  Skin: Skin is warm and dry  Dry skin BLLE   Psychiatric: She has a normal mood and affect   Her behavior is normal

## 2019-10-17 NOTE — ASSESSMENT & PLAN NOTE
BP Readings from Last 1 Encounters:   10/17/19 120/80   Currently controlled  Continue current management  Reassess at next visit

## 2019-10-17 NOTE — PATIENT INSTRUCTIONS
Colonoscopia   LO QUE NECESITA SABER:   ¿Qué necesito saber sobre lilibeth colonoscopia? Lilibeth colonoscopia es un procedimiento para examinar con un endoscopio el interior de martinez colon (intestino)  La sonda es un tubo flexible con lilibeth linh pequeña y Sharlyne Cross en la punta  Trinidad lilibeth colonoscopia, es posible que le retiren pólipos o crecimientos de tejidos  ¿Cómo me debería preparar la semana antes de mi colonoscopia? Usted necesitará dejar de rimma los medicamentos que contienen aspirina o akbar trinidad 7 días antes de martinez colonoscopia  Si usted rosetta anticoagulantes, tiffani warfarina, pregunte cuándo debería dejar de tomarlos  Póngase de acuerdo con alguien para que lo lleve a martinez casa después del procedimiento  ¿Cómo debería prepararme el día antes de mi colonoscopia? Martinez médico le pedirá que prepare lyssa intestinos antes de martinez procedimiento  Lyssa intestinos necesitarán estar vacíos antes de martinez procedimiento para permitirle que winter martinez colon claramente  Usted necesitará hacer lo siguiente el día antes de martinez procedimiento:  · Solo tome líquidos gerber  todo el día antes de martinez colonoscopia  La dieta de líquidos gerber incluye jugos de fruta y caldos livianos, gelatina saborizada [place] y caramelos duros  También incluye café, té, bebidas gaseosas y bebidas deportivas claras  · Siga la preparación de lyssa intestinos tiffani se le indicó  Existen diferentes preparaciones que le pueden jennifer para antes de lilibeth colonoscopia  Algunas se administran por 2 horas y otras por más de 6 horas  Algunas se administran temprano en la tarde del día anterior a la colonoscopía  Otras se administran el día antes y luego en la mañana de la colonoscopia  Con cualquier preparación de intestinos, permanezca cerca del baño  [place] líquido le provocará que tenga evacuaciones intestinales frecuentemente  · Un enema  podría ser necesaria  Martinez médico podría indicarle que use un enema para limpiar lyssa intestinos      · No coma o tome nada después de la medianoche  Ona ayudará a evitar problemas que pueden suceder si usted vomita mientras está bajo anestesia  ¿Qué sucederá trinidad mi colonoscopia? · Sophy Blue medicamento para ayudarlo a relajarse  Se recostará sobre el costado maycol y [address] [place] [address] Drive martinez pecho  Martinez médico le examinará el ano y utilizará un dedo para revisar martinez recto  Si martinez intestino no está vacío le pueden administrar otro enema  El colonoscopio se Viri Rushing y se colocará suavemente en martinez ano  Luego se pasará por el recto hacia el colon  [place] agua o aire en martinez colon para ayudar a limpiarlo o ensancharlo  Ona lo realizará martinez médico para poder observar con claridad martinez colon  · Se pueden rimma muestras de tejido de las ackeramn de martinez intestino y enviarlas al laboratorio para ser analizadas  En gabriel de tener pólipos, martinez médico utiliza un alambre con lilibeth argolla al final que pasará por el interior del endoscopio y lo usará para sostener el pólipo  Luego el pólipo será quemado o cortado de la pared del colon  Los pólipos extraídos serán enviados al laboratorio para ser [employer]  Le pueden rimma imágenes del colon trinidad el procedimiento  El endoscopio será retirado cuando el procedimiento se termine  ¿Qué sucederá después de mi colonoscopia? · Descanse después de martinez procedimiento  Usted podría sentirse inflamado, tener gases y [place] abdominal  Es posible que necesite acostarse sobre martinez costado derecho con lilibeth almohada térmica sobre martinez abdomen  Posiblemente necesite caminar un poco para ayudarse a expulsar los gases  Si se siente inflamado, coma comidas pequeñas  No maneje o tome decisiones importantes hasta el día siguiente de martinez procedimiento  · Es posible que le extraigan los pólipos  No tome aspirina o realice viajes largos en sean dentro de los 7 días después de martinez procedimiento  Pregunte a martinez médico acerca de cualquier otras limitaciones después de martinez procedimiento    ¿Cuáles son los riesgos de [facility] colonoscopia? Usted podría sentir dolor o sangrar después de que remuevan el endoscopio y los pólipos  Es posible que también tenga latido cardíaco lento, disminución de la presión arterial o aumento de la sudoración  Cuevas colon podría sufrir un desgarre debido al aumento de presión del endoscopio y de otros instrumentos  [place] podría provocar que el contenido de parviz intestinos se vacíe de cuevas colon a cuevas abdomen  Si esto sucede, usted tendrá [facility] y Rexanne Andrade cirugía en cuevas colon  ACUERDOS SOBRE CUEVAS CUIDADO:   Usted tiene el derecho de ayudar a planear cuevas cuidado  Aprenda todo lo que pueda sobre cuevas condición y tiffani darle tratamiento  Discuta parviz opciones de tratamiento con parviz médicos para decidir el cuidado que usted desea recibir  Usted siempre tiene el derecho de rechazar el tratamiento  Esta información es sólo para uso en educación  Cuevas intención no es darle un consejo médico sobre enfermedades o tratamientos  Colsulte con cuevsa Alayna Martinez farmacéutico antes de seguir cualquier régimen médico para saber si es seguro y efectivo para usted  © 2017 susanna Enriquez  Information is for End User's use only and may not be sold, redistributed or otherwise used for commercial purposes  All illustrations and images included in CareNotes® are the copyrighted property of A D A M , Inc  or Zenon San

## 2019-10-22 ENCOUNTER — APPOINTMENT (OUTPATIENT)
Dept: LAB | Facility: CLINIC | Age: 69
End: 2019-10-22
Payer: COMMERCIAL

## 2019-10-22 ENCOUNTER — TRANSCRIBE ORDERS (OUTPATIENT)
Dept: LAB | Facility: CLINIC | Age: 69
End: 2019-10-22

## 2019-10-22 DIAGNOSIS — I10 ESSENTIAL HYPERTENSION: ICD-10-CM

## 2019-10-22 LAB
ALBUMIN SERPL BCP-MCNC: 3.8 G/DL (ref 3.5–5)
ALP SERPL-CCNC: 51 U/L (ref 46–116)
ALT SERPL W P-5'-P-CCNC: 22 U/L (ref 12–78)
ANION GAP SERPL CALCULATED.3IONS-SCNC: 3 MMOL/L (ref 4–13)
AST SERPL W P-5'-P-CCNC: 15 U/L (ref 5–45)
BASOPHILS # BLD AUTO: 0.1 THOUSANDS/ΜL (ref 0–0.1)
BASOPHILS NFR BLD AUTO: 2 % (ref 0–1)
BILIRUB SERPL-MCNC: 0.71 MG/DL (ref 0.2–1)
BUN SERPL-MCNC: 14 MG/DL (ref 5–25)
CALCIUM SERPL-MCNC: 9.6 MG/DL (ref 8.3–10.1)
CHLORIDE SERPL-SCNC: 107 MMOL/L (ref 100–108)
CO2 SERPL-SCNC: 31 MMOL/L (ref 21–32)
CREAT SERPL-MCNC: 0.74 MG/DL (ref 0.6–1.3)
EOSINOPHIL # BLD AUTO: 0.11 THOUSAND/ΜL (ref 0–0.61)
EOSINOPHIL NFR BLD AUTO: 2 % (ref 0–6)
ERYTHROCYTE [DISTWIDTH] IN BLOOD BY AUTOMATED COUNT: 13.4 % (ref 11.6–15.1)
GFR SERPL CREATININE-BSD FRML MDRD: 96 ML/MIN/1.73SQ M
GLUCOSE P FAST SERPL-MCNC: 101 MG/DL (ref 65–99)
HCT VFR BLD AUTO: 41.6 % (ref 34.8–46.1)
HGB BLD-MCNC: 13.4 G/DL (ref 11.5–15.4)
IMM GRANULOCYTES # BLD AUTO: 0.03 THOUSAND/UL (ref 0–0.2)
IMM GRANULOCYTES NFR BLD AUTO: 0 % (ref 0–2)
LYMPHOCYTES # BLD AUTO: 1.81 THOUSANDS/ΜL (ref 0.6–4.47)
LYMPHOCYTES NFR BLD AUTO: 27 % (ref 14–44)
MCH RBC QN AUTO: 29.9 PG (ref 26.8–34.3)
MCHC RBC AUTO-ENTMCNC: 32.2 G/DL (ref 31.4–37.4)
MCV RBC AUTO: 93 FL (ref 82–98)
MONOCYTES # BLD AUTO: 0.45 THOUSAND/ΜL (ref 0.17–1.22)
MONOCYTES NFR BLD AUTO: 7 % (ref 4–12)
NEUTROPHILS # BLD AUTO: 4.28 THOUSANDS/ΜL (ref 1.85–7.62)
NEUTS SEG NFR BLD AUTO: 62 % (ref 43–75)
NRBC BLD AUTO-RTO: 0 /100 WBCS
PLATELET # BLD AUTO: 251 THOUSANDS/UL (ref 149–390)
PMV BLD AUTO: 11 FL (ref 8.9–12.7)
POTASSIUM SERPL-SCNC: 3.6 MMOL/L (ref 3.5–5.3)
PROT SERPL-MCNC: 7.8 G/DL (ref 6.4–8.2)
RBC # BLD AUTO: 4.48 MILLION/UL (ref 3.81–5.12)
SODIUM SERPL-SCNC: 141 MMOL/L (ref 136–145)
WBC # BLD AUTO: 6.78 THOUSAND/UL (ref 4.31–10.16)

## 2019-10-22 PROCEDURE — 80053 COMPREHEN METABOLIC PANEL: CPT

## 2019-10-22 PROCEDURE — 36415 COLL VENOUS BLD VENIPUNCTURE: CPT

## 2019-10-22 PROCEDURE — 85025 COMPLETE CBC W/AUTO DIFF WBC: CPT

## 2019-11-06 DIAGNOSIS — I10 ESSENTIAL HYPERTENSION: ICD-10-CM

## 2019-11-06 RX ORDER — AMLODIPINE BESYLATE 10 MG/1
10 TABLET ORAL DAILY
Qty: 30 TABLET | Refills: 0 | Status: SHIPPED | OUTPATIENT
Start: 2019-11-06 | End: 2019-12-27 | Stop reason: SDUPTHER

## 2019-12-20 DIAGNOSIS — I10 ESSENTIAL HYPERTENSION: ICD-10-CM

## 2019-12-23 RX ORDER — CLOPIDOGREL BISULFATE 75 MG/1
TABLET ORAL
Qty: 90 TABLET | Refills: 0 | Status: SHIPPED | OUTPATIENT
Start: 2019-12-23 | End: 2020-04-06

## 2019-12-27 DIAGNOSIS — E78.2 MIXED HYPERLIPIDEMIA: ICD-10-CM

## 2019-12-27 DIAGNOSIS — I10 ESSENTIAL HYPERTENSION: ICD-10-CM

## 2019-12-27 RX ORDER — ATORVASTATIN CALCIUM 20 MG/1
20 TABLET, FILM COATED ORAL DAILY
Qty: 90 TABLET | Refills: 1 | Status: SHIPPED | OUTPATIENT
Start: 2019-12-27 | End: 2020-04-06 | Stop reason: SDUPTHER

## 2019-12-27 RX ORDER — AMLODIPINE BESYLATE 10 MG/1
10 TABLET ORAL DAILY
Qty: 30 TABLET | Refills: 0 | Status: SHIPPED | OUTPATIENT
Start: 2019-12-27 | End: 2020-02-10 | Stop reason: SDUPTHER

## 2019-12-27 RX ORDER — HYDROCHLOROTHIAZIDE 25 MG/1
25 TABLET ORAL DAILY
Qty: 90 TABLET | Refills: 0 | Status: SHIPPED | OUTPATIENT
Start: 2019-12-27 | End: 2020-04-06 | Stop reason: SDUPTHER

## 2019-12-27 RX ORDER — LOSARTAN POTASSIUM 100 MG/1
100 TABLET ORAL DAILY
Qty: 90 TABLET | Refills: 0 | Status: SHIPPED | OUTPATIENT
Start: 2019-12-27 | End: 2020-04-06 | Stop reason: SDUPTHER

## 2019-12-27 NOTE — TELEPHONE ENCOUNTER
Patient came in today asking for refills on: atorvastatin (LIPITOR) 20 mg tablet (, amLODIPine (NORVASC) 10 mg tablet, hydrochlorothiazide (HYDRODIURIL) 25 mg tablet, losartan (COZAAR) 100 MG tablet  Please advise

## 2020-02-10 ENCOUNTER — TELEPHONE (OUTPATIENT)
Dept: FAMILY MEDICINE CLINIC | Facility: CLINIC | Age: 70
End: 2020-02-10

## 2020-02-10 DIAGNOSIS — I10 ESSENTIAL HYPERTENSION: ICD-10-CM

## 2020-02-10 RX ORDER — AMLODIPINE BESYLATE 10 MG/1
10 TABLET ORAL DAILY
Qty: 30 TABLET | Refills: 0 | Status: SHIPPED | OUTPATIENT
Start: 2020-02-10 | End: 2020-03-18 | Stop reason: SDUPTHER

## 2020-03-03 ENCOUNTER — OFFICE VISIT (OUTPATIENT)
Dept: FAMILY MEDICINE CLINIC | Facility: CLINIC | Age: 70
End: 2020-03-03

## 2020-03-03 VITALS
HEART RATE: 104 BPM | TEMPERATURE: 96.5 F | SYSTOLIC BLOOD PRESSURE: 110 MMHG | DIASTOLIC BLOOD PRESSURE: 60 MMHG | BODY MASS INDEX: 30.66 KG/M2 | RESPIRATION RATE: 16 BRPM | HEIGHT: 62 IN | WEIGHT: 166.6 LBS | OXYGEN SATURATION: 99 %

## 2020-03-03 DIAGNOSIS — M85.80 OSTEOPENIA DETERMINED BY X-RAY: ICD-10-CM

## 2020-03-03 DIAGNOSIS — J06.9 UPPER RESPIRATORY TRACT INFECTION, UNSPECIFIED TYPE: Primary | ICD-10-CM

## 2020-03-03 PROCEDURE — 4040F PNEUMOC VAC/ADMIN/RCVD: CPT | Performed by: NURSE PRACTITIONER

## 2020-03-03 PROCEDURE — 3074F SYST BP LT 130 MM HG: CPT | Performed by: NURSE PRACTITIONER

## 2020-03-03 PROCEDURE — 3078F DIAST BP <80 MM HG: CPT | Performed by: NURSE PRACTITIONER

## 2020-03-03 PROCEDURE — 1036F TOBACCO NON-USER: CPT | Performed by: NURSE PRACTITIONER

## 2020-03-03 PROCEDURE — 1160F RVW MEDS BY RX/DR IN RCRD: CPT | Performed by: NURSE PRACTITIONER

## 2020-03-03 PROCEDURE — 3008F BODY MASS INDEX DOCD: CPT | Performed by: NURSE PRACTITIONER

## 2020-03-03 PROCEDURE — 99214 OFFICE O/P EST MOD 30 MIN: CPT | Performed by: NURSE PRACTITIONER

## 2020-03-03 RX ORDER — BENZONATATE 200 MG/1
200 CAPSULE ORAL 3 TIMES DAILY PRN
Qty: 20 CAPSULE | Refills: 0 | Status: SHIPPED | OUTPATIENT
Start: 2020-03-03

## 2020-03-03 RX ORDER — GUAIFENESIN 200 MG/1
400 TABLET ORAL EVERY 4 HOURS PRN
Qty: 30 SUPPOSITORY | Refills: 0 | Status: SHIPPED | OUTPATIENT
Start: 2020-03-03 | End: 2022-04-13

## 2020-03-03 RX ORDER — FLUTICASONE PROPIONATE 50 MCG
1 SPRAY, SUSPENSION (ML) NASAL DAILY
Qty: 1 BOTTLE | Refills: 0 | Status: SHIPPED | OUTPATIENT
Start: 2020-03-03 | End: 2022-04-13

## 2020-03-03 NOTE — PROGRESS NOTES
Assessment/Plan:    Shraddha was seen today for cough  Diagnoses and all orders for this visit:    Upper respiratory tract infection, unspecified type  -     benzonatate (TESSALON) 200 MG capsule; Take 1 capsule (200 mg total) by mouth 3 (three) times a day as needed for cough  -     guaiFENesin 200 MG tablet; Take 2 tablets (400 mg total) by mouth every 4 (four) hours as needed for cough  -     fluticasone (FLONASE) 50 mcg/act nasal spray; 1 spray into each nostril daily    Osteopenia determined by x-ray  -     Calcium Carbonate-Vitamin D3 600-400 MG-UNIT TABS; Take 1 tablet by mouth daily      No evidence of bacterial infection on exam  Supportive treatment indicated at this time  Advised to drink increased fluids and rest  Use humidifier at HS  Use salt water gargles b i d  Advised patient to return to clinic if no improvement in symptoms in about 1 week  Otherwise return to clinic in about 4 weeks for follow-up chronic conditions with PCP  Return in about 4 weeks (around 3/31/2020) for FU for htn   Patient Instructions   Acute Cough   WHAT YOU NEED TO KNOW:   An acute cough can last up to 3 weeks  Common causes of an acute cough include a cold, allergies, or a lung infection  DISCHARGE INSTRUCTIONS:   Return to the emergency department if:   · You have trouble breathing or feel short of breath  · You cough up blood, or you see blood in your mucus  · You faint or feel weak or dizzy  · You have chest pain when you cough or take a deep breath  · You have new wheezing  Contact your healthcare provider if:   · You have a fever  · Your cough lasts longer than 4 weeks  · Your symptoms do not improve with treatment  · You have questions or concerns about your condition or care  Medicines:   · Medicines  may be needed to stop the cough, decrease swelling in your airways, or help open your airways  Medicine may also be given to help you cough up mucus   Ask your healthcare provider what over-the-counter medicines you can take  If you have an infection caused by bacteria, you may need antibiotics  · Take your medicine as directed  Contact your healthcare provider if you think your medicine is not helping or if you have side effects  Tell him or her if you are allergic to any medicine  Keep a list of the medicines, vitamins, and herbs you take  Include the amounts, and when and why you take them  Bring the list or the pill bottles to follow-up visits  Carry your medicine list with you in case of an emergency  Manage your symptoms:   · Do not smoke and stay away from others who smoke  Nicotine and other chemicals in cigarettes and cigars can cause lung damage and make your cough worse  Ask your healthcare provider for information if you currently smoke and need help to quit  E-cigarettes or smokeless tobacco still contain nicotine  Talk to your healthcare provider before you use these products  · Drink extra liquids as directed  Liquids will help thin and loosen mucus so you can cough it up  Liquids will also help prevent dehydration  Examples of good liquids to drink include water, fruit juice, and broth  Do not drink liquids that contain caffeine  Caffeine can increase your risk for dehydration  Ask your healthcare provider how much liquid to drink each day  · Rest as directed  Do not do activities that make your cough worse, such as exercise  · Use a humidifier or vaporizer  Use a cool mist humidifier or a vaporizer to increase air moisture in your home  This may make it easier for you to breathe and help decrease your cough  · Eat 2 to 5 mL of honey 2 times each day  Honey can help thin mucus and decrease your cough  · Use cough drops or lozenges  These can help decrease throat irritation and your cough  Follow up with your healthcare provider as directed:  Write down your questions so you remember to ask them during your visits     © 2017 [employer] [address] is for End User's use only and may not be sold, redistributed or otherwise used for commercial purposes  All illustrations and images included in CareNotes® are the copyrighted property of A D A M , Inc  or [employer]  The above information is an  only  It is not intended as medical advice for individual conditions or treatments  Talk to your doctor, nurse or pharmacist before following any medical regimen to see if it is safe and effective for you  Diagnoses and all orders for this visit:    Upper respiratory tract infection, unspecified type  -     benzonatate (TESSALON) 200 MG capsule; Take 1 capsule (200 mg total) by mouth 3 (three) times a day as needed for cough  -     guaiFENesin 200 MG tablet; Take 2 tablets (400 mg total) by mouth every 4 (four) hours as needed for cough  -     fluticasone (FLONASE) 50 mcg/act nasal spray; 1 spray into each nostril daily    Osteopenia determined by x-ray  -     Calcium Carbonate-Vitamin D3 600-400 MG-UNIT TABS; Take 1 tablet by mouth daily          Subjective:     Shraddha Landon is a 71 y o  female who  has a past medical history of Echocardiogram abnormal, History of mammogram, History of mammogram, and Osteopenia determined by x-ray  who presented to the office today for cough  HPI  Patient is Kinyarwanda speaking only, interpretive services are used for this visit  The patient has a PMH significant for HTN and HLD, both generally well-controlled on prescribed medications  Patient mentioned that she has been having a cough for 2 weeks  She has coughing when she talks on the phone  She is also having sore throat  She took over the counter medication with some relief  The patient is not having any fevers, headaches, nausea, or diarrhea  The patient denies any dyspnea, palpitations, chest pain, leg swelling, orthopnea      The following portions of the patient's history were reviewed and updated as appropriate: allergies, current medications, past family history, past medical history, past social history, past surgical history and problem list     Current Outpatient Medications on File Prior to Visit   Medication Sig Dispense Refill    amLODIPine (NORVASC) 10 mg tablet Take 1 tablet (10 mg total) by mouth daily 30 tablet 0    atorvastatin (LIPITOR) 20 mg tablet Take 1 tablet (20 mg total) by mouth daily 90 tablet 1    Blood Pressure KIT Take BP reading daily and as needed  1 each 0    clopidogrel (PLAVIX) 75 mg tablet TAKE 1 TABLET BY MOUTH ONCE DAILY  90 tablet 0    hydrochlorothiazide (HYDRODIURIL) 25 mg tablet Take 1 tablet (25 mg total) by mouth daily 90 tablet 0    losartan (COZAAR) 100 MG tablet Take 1 tablet (100 mg total) by mouth daily 90 tablet 0    [DISCONTINUED] Calcium Carbonate-Vitamin D3 600-400 MG-UNIT TABS Take 1 tablet by mouth daily for 180 days 180 tablet 0     No current facility-administered medications on file prior to visit  Review of Systems   Constitutional: Negative for activity change, appetite change, chills, fatigue, fever and unexpected weight change  HENT: Positive for congestion and sore throat  Negative for hearing loss, nosebleeds, sinus pain, sneezing and trouble swallowing  Eyes: Negative for photophobia and visual disturbance  Respiratory: Positive for cough  Negative for chest tightness, shortness of breath and wheezing  Cardiovascular: Negative for chest pain, palpitations and leg swelling  Gastrointestinal: Negative for abdominal pain, constipation, nausea and vomiting  Genitourinary: Negative for decreased urine volume, difficulty urinating, dysuria, flank pain and urgency  Musculoskeletal: Negative for back pain and gait problem  Skin: Negative for pallor, rash and wound  Neurological: Negative for dizziness, seizures, syncope, weakness, numbness and headaches  Hematological: Negative for adenopathy  Does not bruise/bleed easily  Objective:    /60 (BP Location: Left arm, Patient Position: Sitting, Cuff Size: Large)   Pulse 104   Temp (!) 96 5 °F (35 8 °C) (Temporal)   Resp 16   Ht 5' 2" (1 575 m)   Wt 75 6 kg (166 lb 9 6 oz)   SpO2 99%   BMI 30 47 kg/m²     Physical Exam   Constitutional: She is oriented to person, place, and time  She appears well-developed and well-nourished  No distress  HENT:   Head: Normocephalic and atraumatic  Right Ear: Tympanic membrane and external ear normal    Left Ear: Tympanic membrane and external ear normal    Nose: Mucosal edema and rhinorrhea present  Mouth/Throat: Posterior oropharyngeal erythema (mild) present  No oropharyngeal exudate  No tonsillar exudate  Eyes: Pupils are equal, round, and reactive to light  Conjunctivae and EOM are normal  Right eye exhibits no discharge  Left eye exhibits no discharge  Neck: Normal range of motion  Neck supple  Cardiovascular: Normal rate, regular rhythm, normal heart sounds and intact distal pulses  Pulmonary/Chest: Effort normal and breath sounds normal  No respiratory distress  She has no wheezes  Abdominal: Soft  Bowel sounds are normal  She exhibits no distension  There is no tenderness  Musculoskeletal: Normal range of motion  She exhibits no edema or deformity  Lymphadenopathy:     She has no cervical adenopathy  Neurological: She is alert and oriented to person, place, and time  Skin: Skin is warm and dry  Capillary refill takes less than 2 seconds  No rash noted  She is not diaphoretic  Psychiatric: She has a normal mood and affect  Her behavior is normal    Nursing note and vitals reviewed        MINDY Martinez  03/03/20  9:19 AM

## 2020-03-03 NOTE — PATIENT INSTRUCTIONS
Acute Cough   WHAT YOU NEED TO KNOW:   An acute cough can last up to 3 weeks  Common causes of an acute cough include a cold, allergies, or a lung infection  DISCHARGE INSTRUCTIONS:   Return to the emergency department if:   · You have trouble breathing or feel short of breath  · You cough up blood, or you see blood in your mucus  · You faint or feel weak or dizzy  · You have chest pain when you cough or take a deep breath  · You have new wheezing  Contact your healthcare provider if:   · You have a fever  · Your cough lasts longer than 4 weeks  · Your symptoms do not improve with treatment  · You have questions or concerns about your condition or care  Medicines:   · Medicines  may be needed to stop the cough, decrease swelling in your airways, or help open your airways  Medicine may also be given to help you cough up mucus  Ask your healthcare provider what over-the-counter medicines you can take  If you have an infection caused by bacteria, you may need antibiotics  · Take your medicine as directed  Contact your healthcare provider if you think your medicine is not helping or if you have side effects  Tell him or her if you are allergic to any medicine  Keep a list of the medicines, vitamins, and herbs you take  Include the amounts, and when and why you take them  Bring the list or the pill bottles to follow-up visits  Carry your medicine list with you in case of an emergency  Manage your symptoms:   · Do not smoke and stay away from others who smoke  Nicotine and other chemicals in cigarettes and cigars can cause lung damage and make your cough worse  Ask your healthcare provider for information if you currently smoke and need help to quit  E-cigarettes or smokeless tobacco still contain nicotine  Talk to your healthcare provider before you use these products  · Drink extra liquids as directed  Liquids will help thin and loosen mucus so you can cough it up   Liquids will also help prevent dehydration  Examples of good liquids to drink include water, fruit juice, and broth  Do not drink liquids that contain caffeine  Caffeine can increase your risk for dehydration  Ask your healthcare provider how much liquid to drink each day  · Rest as directed  Do not do activities that make your cough worse, such as exercise  · Use a humidifier or vaporizer  Use a cool mist humidifier or a vaporizer to increase air moisture in your home  This may make it easier for you to breathe and help decrease your cough  · Eat 2 to 5 mL of honey 2 times each day  Honey can help thin mucus and decrease your cough  · Use cough drops or lozenges  These can help decrease throat irritation and your cough  Follow up with your healthcare provider as directed:  Write down your questions so you remember to ask them during your visits  © 2017 [address] Information is for End User's use only and may not be sold, redistributed or otherwise used for commercial purposes  All illustrations and images included in CareNotes® are the copyrighted property of A D A M , Inc  or [employer]  The above information is an  only  It is not intended as medical advice for individual conditions or treatments  Talk to your doctor, nurse or pharmacist before following any medical regimen to see if it is safe and effective for you

## 2020-03-18 DIAGNOSIS — I10 ESSENTIAL HYPERTENSION: ICD-10-CM

## 2020-03-18 RX ORDER — AMLODIPINE BESYLATE 10 MG/1
10 TABLET ORAL DAILY
Qty: 30 TABLET | Refills: 0 | Status: SHIPPED | OUTPATIENT
Start: 2020-03-18 | End: 2020-04-06 | Stop reason: SDUPTHER

## 2020-04-06 ENCOUNTER — TELEMEDICINE (OUTPATIENT)
Dept: FAMILY MEDICINE CLINIC | Facility: CLINIC | Age: 70
End: 2020-04-06

## 2020-04-06 DIAGNOSIS — M85.80 OSTEOPENIA DETERMINED BY X-RAY: ICD-10-CM

## 2020-04-06 DIAGNOSIS — I10 ESSENTIAL HYPERTENSION: Primary | ICD-10-CM

## 2020-04-06 DIAGNOSIS — E78.2 MIXED HYPERLIPIDEMIA: ICD-10-CM

## 2020-04-06 PROCEDURE — 99213 OFFICE O/P EST LOW 20 MIN: CPT | Performed by: FAMILY MEDICINE

## 2020-04-06 RX ORDER — ATORVASTATIN CALCIUM 20 MG/1
20 TABLET, FILM COATED ORAL DAILY
Qty: 90 TABLET | Refills: 3 | Status: SHIPPED | OUTPATIENT
Start: 2020-04-06 | End: 2021-07-21 | Stop reason: ALTCHOICE

## 2020-04-06 RX ORDER — AMLODIPINE BESYLATE 10 MG/1
10 TABLET ORAL DAILY
Qty: 90 TABLET | Refills: 3 | Status: SHIPPED | OUTPATIENT
Start: 2020-04-06 | End: 2020-11-09 | Stop reason: SDUPTHER

## 2020-04-06 RX ORDER — HYDROCHLOROTHIAZIDE 25 MG/1
25 TABLET ORAL DAILY
Qty: 90 TABLET | Refills: 3 | Status: SHIPPED | OUTPATIENT
Start: 2020-04-06 | End: 2020-11-09 | Stop reason: SDUPTHER

## 2020-04-06 RX ORDER — LOSARTAN POTASSIUM 100 MG/1
100 TABLET ORAL DAILY
Qty: 90 TABLET | Refills: 3 | Status: SHIPPED | OUTPATIENT
Start: 2020-04-06 | End: 2020-11-09 | Stop reason: SDUPTHER

## 2020-06-01 ENCOUNTER — TELEMEDICINE (OUTPATIENT)
Dept: FAMILY MEDICINE CLINIC | Facility: CLINIC | Age: 70
End: 2020-06-01

## 2020-06-01 ENCOUNTER — TELEPHONE (OUTPATIENT)
Dept: FAMILY MEDICINE CLINIC | Facility: CLINIC | Age: 70
End: 2020-06-01

## 2020-06-01 DIAGNOSIS — E78.2 MIXED HYPERLIPIDEMIA: ICD-10-CM

## 2020-06-01 DIAGNOSIS — M25.562 CHRONIC PAIN OF BOTH KNEES: ICD-10-CM

## 2020-06-01 DIAGNOSIS — M25.561 CHRONIC PAIN OF BOTH KNEES: ICD-10-CM

## 2020-06-01 DIAGNOSIS — I10 ESSENTIAL HYPERTENSION: Primary | ICD-10-CM

## 2020-06-01 DIAGNOSIS — G89.29 CHRONIC PAIN OF BOTH KNEES: ICD-10-CM

## 2020-06-01 PROCEDURE — 99213 OFFICE O/P EST LOW 20 MIN: CPT | Performed by: FAMILY MEDICINE

## 2020-06-05 ENCOUNTER — APPOINTMENT (OUTPATIENT)
Dept: LAB | Facility: CLINIC | Age: 70
End: 2020-06-05
Payer: COMMERCIAL

## 2020-06-05 ENCOUNTER — HOSPITAL ENCOUNTER (OUTPATIENT)
Dept: RADIOLOGY | Facility: HOSPITAL | Age: 70
Discharge: HOME | End: 2020-06-05
Payer: COMMERCIAL

## 2020-06-05 DIAGNOSIS — G89.29 CHRONIC PAIN OF BOTH KNEES: ICD-10-CM

## 2020-06-05 DIAGNOSIS — I10 ESSENTIAL HYPERTENSION: ICD-10-CM

## 2020-06-05 DIAGNOSIS — M25.562 CHRONIC PAIN OF BOTH KNEES: ICD-10-CM

## 2020-06-05 DIAGNOSIS — E78.2 MIXED HYPERLIPIDEMIA: ICD-10-CM

## 2020-06-05 DIAGNOSIS — M85.80 OSTEOPENIA DETERMINED BY X-RAY: ICD-10-CM

## 2020-06-05 DIAGNOSIS — M25.561 CHRONIC PAIN OF BOTH KNEES: ICD-10-CM

## 2020-06-05 LAB
ALBUMIN SERPL BCP-MCNC: 3.6 G/DL (ref 3.5–5)
ALP SERPL-CCNC: 62 U/L (ref 46–116)
ALT SERPL W P-5'-P-CCNC: 24 U/L (ref 12–78)
ANION GAP SERPL CALCULATED.3IONS-SCNC: 4 MMOL/L (ref 4–13)
AST SERPL W P-5'-P-CCNC: 14 U/L (ref 5–45)
BASOPHILS # BLD AUTO: 0.09 THOUSANDS/ΜL (ref 0–0.1)
BASOPHILS NFR BLD AUTO: 1 % (ref 0–1)
BILIRUB SERPL-MCNC: 0.79 MG/DL (ref 0.2–1)
BUN SERPL-MCNC: 16 MG/DL (ref 5–25)
CALCIUM SERPL-MCNC: 9.5 MG/DL (ref 8.3–10.1)
CHLORIDE SERPL-SCNC: 105 MMOL/L (ref 100–108)
CHOLEST SERPL-MCNC: 141 MG/DL (ref 50–200)
CO2 SERPL-SCNC: 31 MMOL/L (ref 21–32)
CREAT SERPL-MCNC: 0.72 MG/DL (ref 0.6–1.3)
EOSINOPHIL # BLD AUTO: 0.12 THOUSAND/ΜL (ref 0–0.61)
EOSINOPHIL NFR BLD AUTO: 2 % (ref 0–6)
ERYTHROCYTE [DISTWIDTH] IN BLOOD BY AUTOMATED COUNT: 13.4 % (ref 11.6–15.1)
GFR SERPL CREATININE-BSD FRML MDRD: 99 ML/MIN/1.73SQ M
GLUCOSE P FAST SERPL-MCNC: 91 MG/DL (ref 65–99)
HCT VFR BLD AUTO: 42.5 % (ref 34.8–46.1)
HDLC SERPL-MCNC: 59 MG/DL
HGB BLD-MCNC: 13.7 G/DL (ref 11.5–15.4)
IMM GRANULOCYTES # BLD AUTO: 0.02 THOUSAND/UL (ref 0–0.2)
IMM GRANULOCYTES NFR BLD AUTO: 0 % (ref 0–2)
LDLC SERPL CALC-MCNC: 63 MG/DL (ref 0–100)
LYMPHOCYTES # BLD AUTO: 2.16 THOUSANDS/ΜL (ref 0.6–4.47)
LYMPHOCYTES NFR BLD AUTO: 32 % (ref 14–44)
MCH RBC QN AUTO: 30.4 PG (ref 26.8–34.3)
MCHC RBC AUTO-ENTMCNC: 32.2 G/DL (ref 31.4–37.4)
MCV RBC AUTO: 94 FL (ref 82–98)
MONOCYTES # BLD AUTO: 0.68 THOUSAND/ΜL (ref 0.17–1.22)
MONOCYTES NFR BLD AUTO: 10 % (ref 4–12)
NEUTROPHILS # BLD AUTO: 3.73 THOUSANDS/ΜL (ref 1.85–7.62)
NEUTS SEG NFR BLD AUTO: 55 % (ref 43–75)
NRBC BLD AUTO-RTO: 0 /100 WBCS
PLATELET # BLD AUTO: 247 THOUSANDS/UL (ref 149–390)
PMV BLD AUTO: 10.2 FL (ref 8.9–12.7)
POTASSIUM SERPL-SCNC: 3.5 MMOL/L (ref 3.5–5.3)
PROT SERPL-MCNC: 8 G/DL (ref 6.4–8.2)
RBC # BLD AUTO: 4.5 MILLION/UL (ref 3.81–5.12)
SODIUM SERPL-SCNC: 140 MMOL/L (ref 136–145)
TRIGL SERPL-MCNC: 96 MG/DL
WBC # BLD AUTO: 6.8 THOUSAND/UL (ref 4.31–10.16)

## 2020-06-05 PROCEDURE — 80053 COMPREHEN METABOLIC PANEL: CPT

## 2020-06-05 PROCEDURE — 73562 X-RAY EXAM OF KNEE 3: CPT

## 2020-06-05 PROCEDURE — 80061 LIPID PANEL: CPT

## 2020-06-05 PROCEDURE — 85025 COMPLETE CBC W/AUTO DIFF WBC: CPT

## 2020-06-05 PROCEDURE — 36415 COLL VENOUS BLD VENIPUNCTURE: CPT

## 2020-06-11 ENCOUNTER — TELEPHONE (OUTPATIENT)
Dept: FAMILY MEDICINE CLINIC | Facility: CLINIC | Age: 70
End: 2020-06-11

## 2020-06-15 DIAGNOSIS — M25.562 CHRONIC PAIN OF BOTH KNEES: Primary | ICD-10-CM

## 2020-06-15 DIAGNOSIS — M25.561 CHRONIC PAIN OF BOTH KNEES: Primary | ICD-10-CM

## 2020-06-15 DIAGNOSIS — G89.29 CHRONIC PAIN OF BOTH KNEES: Primary | ICD-10-CM

## 2020-06-15 RX ORDER — LIDOCAINE 50 MG/G
1 PATCH TOPICAL DAILY
Qty: 30 PATCH | Refills: 5 | Status: SHIPPED | OUTPATIENT
Start: 2020-06-15 | End: 2022-04-13

## 2020-07-17 ENCOUNTER — OFFICE VISIT (OUTPATIENT)
Dept: FAMILY MEDICINE CLINIC | Facility: CLINIC | Age: 70
End: 2020-07-17

## 2020-07-17 VITALS
BODY MASS INDEX: 31.47 KG/M2 | RESPIRATION RATE: 16 BRPM | WEIGHT: 171 LBS | SYSTOLIC BLOOD PRESSURE: 124 MMHG | TEMPERATURE: 97 F | OXYGEN SATURATION: 96 % | HEIGHT: 62 IN | DIASTOLIC BLOOD PRESSURE: 76 MMHG | HEART RATE: 86 BPM

## 2020-07-17 DIAGNOSIS — G89.29 CHRONIC PAIN OF BOTH KNEES: Primary | ICD-10-CM

## 2020-07-17 DIAGNOSIS — M25.562 CHRONIC PAIN OF BOTH KNEES: Primary | ICD-10-CM

## 2020-07-17 DIAGNOSIS — M79.604 PAIN AND SWELLING OF RIGHT LOWER EXTREMITY: ICD-10-CM

## 2020-07-17 DIAGNOSIS — M79.89 PAIN AND SWELLING OF RIGHT LOWER EXTREMITY: ICD-10-CM

## 2020-07-17 DIAGNOSIS — M25.561 CHRONIC PAIN OF BOTH KNEES: Primary | ICD-10-CM

## 2020-07-17 DIAGNOSIS — M70.62 TROCHANTERIC BURSITIS, LEFT HIP: ICD-10-CM

## 2020-07-17 PROCEDURE — 3074F SYST BP LT 130 MM HG: CPT | Performed by: FAMILY MEDICINE

## 2020-07-17 PROCEDURE — 1036F TOBACCO NON-USER: CPT | Performed by: FAMILY MEDICINE

## 2020-07-17 PROCEDURE — 4040F PNEUMOC VAC/ADMIN/RCVD: CPT | Performed by: FAMILY MEDICINE

## 2020-07-17 PROCEDURE — 1160F RVW MEDS BY RX/DR IN RCRD: CPT | Performed by: FAMILY MEDICINE

## 2020-07-17 PROCEDURE — 99214 OFFICE O/P EST MOD 30 MIN: CPT | Performed by: FAMILY MEDICINE

## 2020-07-17 PROCEDURE — 3078F DIAST BP <80 MM HG: CPT | Performed by: FAMILY MEDICINE

## 2020-07-17 RX ORDER — ACETAMINOPHEN 500 MG
500 TABLET ORAL EVERY 6 HOURS PRN
Qty: 30 TABLET | Refills: 0 | Status: SHIPPED | OUTPATIENT
Start: 2020-07-17 | End: 2020-07-17 | Stop reason: SDUPTHER

## 2020-07-17 RX ORDER — LIDOCAINE HYDROCHLORIDE 20 MG/ML
JELLY TOPICAL AS NEEDED
Qty: 1 TUBE | Refills: 1 | Status: SHIPPED | OUTPATIENT
Start: 2020-07-17 | End: 2022-04-13

## 2020-07-17 RX ORDER — ACETAMINOPHEN 500 MG
1000 TABLET ORAL 3 TIMES DAILY PRN
Qty: 30 TABLET | Refills: 0 | Status: SHIPPED | OUTPATIENT
Start: 2020-07-17 | End: 2020-11-09

## 2020-07-17 NOTE — ASSESSMENT & PLAN NOTE
- right lower leg swelling with 1+ pitting edema concern for DVT       - referral for VAS lower limb venous duplex study, unilateral/limited; follow up appointment scheduled for two weeks from now

## 2020-07-17 NOTE — PROGRESS NOTES
Assessment/Plan:    Chronic pain of both knees  - Pain and swelling in right lower extremity for two months duration alleviated with acetaminophen  - Prescribed lidocaine (XYLOCAINE) 2 % topical gel; Apply topically as needed for moderate pain  -  Given patient age and comorbidities, patient is not a good candidate for NSAID use ::: Prescribed acetaminophen (TYLENOL) 500 mg tablet; Take 2 tablets (1,000 mg total) by mouth 3 (three) times a day as needed for mild pain  - Patient may be candidate for PT - consider referral if pain persists     Pain and swelling of right lower extremity  - right lower leg swelling with 1+ pitting edema concern for DVT       - referral for VAS lower limb venous duplex study, unilateral/limited; follow up appointment scheduled for two weeks from now    Trochanteric bursitis, left hip  - Left hip pain likely secondary to trochanteric bursitis   - Patient counseled on condition and sent home with information to read  - Prescribed acetaminophen (TYLENOL) 500 mg tablet; Take 2 tablets (1,000 mg total) by mouth 3 (three) times a day as needed for mild pain  - Consider adding x-ray to future visit if patient reports worsening pain despite treatment with acetaminophen        No follow-ups on file  Subjective:     HPI  Ms Tacho Gates is a 72 yo female with a PMH of HTN, hyperlipidemia and osteopenia of both knees confirmed on xray (6/05/2020) who presents with right knee pain and swelling  Patient reports pain severity of 9/10  She also endorses left hip pain that worsens with prolonged standing  She states taking low dose acetaminophen with only mild improvement of her symptoms  She states that she would like something stronger for the pain, such as an NSAID  Patient denies SOB, chest pain, or lower leg claudication  Review of Systems   Constitutional: Negative for fever and unexpected weight change  HENT: Negative for congestion and sinus pressure      Respiratory: Negative for cough and shortness of breath  Cardiovascular: Positive for leg swelling  Negative for chest pain  Musculoskeletal: Positive for arthralgias (Patient endorses pain in both knees more pronounced in the right knee and left hip ) and joint swelling (patient endorses swelling in both lower extremities, more pronounced in the right)  Negative for gait problem  Neurological: Negative for dizziness, syncope and headaches  Objective:    /76 (BP Location: Left arm, Patient Position: Sitting, Cuff Size: Large)   Pulse 86   Temp (!) 97 °F (36 1 °C) (Temporal)   Resp 16   Ht 5' 2" (1 575 m)   Wt 77 6 kg (171 lb)   SpO2 96%   Breastfeeding No   BMI 31 28 kg/m²     Physical Exam   Constitutional: She appears well-developed and well-nourished  HENT:   Head: Normocephalic and atraumatic  Cardiovascular: Normal rate, regular rhythm, normal heart sounds and intact distal pulses  Pulmonary/Chest: Effort normal and breath sounds normal    Musculoskeletal: She exhibits edema (1+ pitting edema on the anterior tibial surface of the right lower extremity) and tenderness (mild pain on palpation of both knees; significant pain on palpation of left hip)  She exhibits no deformity     Pain was observed upon flexion of the left hip       Jas Ramirez MD  07/18/20  6:38 PM

## 2020-07-17 NOTE — ASSESSMENT & PLAN NOTE
- Pain and swelling in right lower extremity for two months duration alleviated with acetaminophen  - Prescribed lidocaine (XYLOCAINE) 2 % topical gel; Apply topically as needed for moderate pain  -  Given patient age and comorbidities, patient is not a good candidate for NSAID use ::: Prescribed acetaminophen (TYLENOL) 500 mg tablet;  Take 2 tablets (1,000 mg total) by mouth 3 (three) times a day as needed for mild pain  - Patient may be candidate for PT - consider referral if pain persists

## 2020-07-17 NOTE — PATIENT INSTRUCTIONS
Osteoartritis   LO QUE NECESITA SABER:   La osteoartritis ocurre cuando el cartílago (tejido que amortigua lilibeth articulación) se desgasta lentamente y causa que los huesos rocen entre ellos  Esta enfermedad es lilibeth afección a lizett plazo que con frecuencia afecta las sharron, katherine, parte baja de la espalda, rodillas y caderas  La osteoartritis también se conoce tiffani artrosis o enfermedad degenerativa de las articulaciones  INSTRUCCIONES SOBRE EL UDAY HOSPITALARIA:   Regrese a la denae de emergencias si:   · Usted tiene dolor intenso  · Usted no puede  la articulación  Pregúntele a martinez Harsha Banker vitaminas y minerales son adecuados para usted  · Usted tiene fiebre  · Martinez articulación Donnalee Wesley y sensible  · Usted tiene preguntas o inquietudes acerca de martinez condición o cuidado  Medicamentos:   · El acetaminofén  sirve para reducir el dolor  Está disponible sin receta médica  Pregunte la cantidad y la frecuencia con que debe tomarlos  [id number]  El acetaminofén puede causar daño en el hígado cuando no se rosetta de forma correcta  · AINEs (Analgésicos antiinflamatorios no esteroides) tiffani el ibuprofeno, ayudan a disminuir la inflamación, el dolor y la [place]  [place] medicamento esta disponible con o sin lilibeth receta médica  Los AINEs pueden causar sangrado estomacal o problemas renales en ciertas personas  Si usted rosetta un medicamento anticoagulante, siempre pregúntele a martinez médico si los SAMANTHA son seguros para usted  Siempre hector la etiqueta de mandy medicamento y [place] instrucciones  · Un medicamento con receta para el dolor  podría administrarse para disminuir el dolor intenso si otros medicamentos no funcionan  Polvadera el medicamento tiffani se le indique  No espere a que el dolor sea muy intenso para rimma el medicamento  · Polvadera parviz medicamentos tiffani se le haya indicado  Consulte con martinez médico si usted rosenda que martinez medicamento no le está ayudando o si presenta efectos secundarios   Infórmele si es alérgico a cualquier medicamento  Mantenga lilibeth lista actualizada de los [place], las vitaminas y los productos herbales que rosetta  Incluya los siguientes datos de los medicamentos: cantidad, frecuencia y motivo de administración  Traiga con usted la lista o los envases de la píldoras a parviz citas de seguimiento  Lleve la lista de los medicamentos con usted en gabriel de lilibeth emergencia  Acuda a parviz consultas de control con martinez médico según le indicaron  Anote parviz preguntas para que se acuerde de hacerlas trinidad parviz visitas  Acuda a la terapia física tiffani se le indique: Un fisioterapeuta le enseñará los ejercicios para mejorar el movimiento y la [place], con el fin de disminuir el dolor en las articulaciones  Los ejercicios también ayudan a [employer] de pérdida de función  Controle martinez osteoartritis:   · Manténgase activo  La actividad física puede reducir el dolor y mejorar martinez habilidad de hacer parviz actividades diarias  Evite actividades que le causen [place] Shan  Pregúntele a martinez médico qué tipos de ejercicios son los adecuados para usted  · Mantenga un peso saludable  [place] ayuda a disminuir la presión en las articulaciones en martinez espalda, caderas, rodillas, tobillos y pies  Consulte con martinez médico cuánto debería pesar  Pida que le ayude a crear un plan para bajar de peso si usted tiene sobrepeso  · Aplique calor o hielo  en las articulaciones [address] indicaciones  El calor o el hielo pueden ayudar a disminuir el dolor, la inflamación y los espasmos musculares  Use lilibeth almohadilla de calor eléctrica en temperatura baja o tome lilibeth ducha tibia  Use un paquete de hielo o ponga hielo molido dentro de [employer]  Cúbrala con lilibeth toalla  · Dése un masaje  en los músculos alrededor de la articulación para aliviar el dolor y la rigidez  · Use un bastón, unas muletas o lilibeth caminador  para proteger y aliviar la presión en martinez [place] [employer] y articulaciones de la cadera   [employer] pueden ordenar plantillas ortopédicas para parviz zapatos para disminuir la presión de parviz articulaciones  · Use zapatos sin tacones o de tacones bajos  [employer] le servirá para aliviar el dolor y a disminuir la presión que se ejerce en las articulaciones de patel Janet Sees y caderas  © 2017 [address] Information is for End User's use only and may not be sold, redistributed or otherwise used for commercial purposes  All illustrations and images included in CareNotes® are the copyrighted property of A D A M , Inc  or [employer]  Esta información es sólo para uso en educación  Patel intención no es darle un consejo médico sobre enfermedades o tratamientos  Colsulte con patel Olivia Angst farmacéutico antes de seguir cualquier régimen médico para saber si es seguro y efectivo para usted

## 2020-08-05 ENCOUNTER — HOSPITAL ENCOUNTER (OUTPATIENT)
Dept: NON INVASIVE DIAGNOSTICS | Facility: HOSPITAL | Age: 70
Discharge: HOME | End: 2020-08-05
Payer: COMMERCIAL

## 2020-08-05 DIAGNOSIS — M79.89 PAIN AND SWELLING OF RIGHT LOWER EXTREMITY: ICD-10-CM

## 2020-08-05 DIAGNOSIS — M79.604 PAIN AND SWELLING OF RIGHT LOWER EXTREMITY: ICD-10-CM

## 2020-08-05 PROCEDURE — 93971 EXTREMITY STUDY: CPT

## 2020-08-05 PROCEDURE — 93971 EXTREMITY STUDY: CPT | Performed by: SURGERY

## 2020-08-07 ENCOUNTER — OFFICE VISIT (OUTPATIENT)
Dept: FAMILY MEDICINE CLINIC | Facility: CLINIC | Age: 70
End: 2020-08-07

## 2020-08-07 VITALS
BODY MASS INDEX: 31.64 KG/M2 | WEIGHT: 173 LBS | OXYGEN SATURATION: 96 % | HEART RATE: 70 BPM | RESPIRATION RATE: 16 BRPM | SYSTOLIC BLOOD PRESSURE: 120 MMHG | TEMPERATURE: 97.1 F | DIASTOLIC BLOOD PRESSURE: 80 MMHG

## 2020-08-07 DIAGNOSIS — M17.11 PRIMARY OSTEOARTHRITIS OF RIGHT KNEE: Primary | ICD-10-CM

## 2020-08-07 DIAGNOSIS — M71.21 POPLITEAL CYST, RIGHT: ICD-10-CM

## 2020-08-07 PROBLEM — Z12.11 SCREENING FOR COLON CANCER: Status: RESOLVED | Noted: 2019-10-17 | Resolved: 2020-08-07

## 2020-08-07 PROBLEM — Z01.810 PREOPERATIVE CARDIOVASCULAR EXAMINATION: Status: RESOLVED | Noted: 2019-05-02 | Resolved: 2020-08-07

## 2020-08-07 PROBLEM — M70.62 TROCHANTERIC BURSITIS, LEFT HIP: Status: RESOLVED | Noted: 2020-07-17 | Resolved: 2020-08-07

## 2020-08-07 PROCEDURE — 99213 OFFICE O/P EST LOW 20 MIN: CPT | Performed by: FAMILY MEDICINE

## 2020-08-07 PROCEDURE — 1160F RVW MEDS BY RX/DR IN RCRD: CPT | Performed by: FAMILY MEDICINE

## 2020-08-07 PROCEDURE — 3079F DIAST BP 80-89 MM HG: CPT | Performed by: FAMILY MEDICINE

## 2020-08-07 PROCEDURE — 1036F TOBACCO NON-USER: CPT | Performed by: FAMILY MEDICINE

## 2020-08-07 PROCEDURE — 3074F SYST BP LT 130 MM HG: CPT | Performed by: FAMILY MEDICINE

## 2020-08-07 PROCEDURE — 20610 DRAIN/INJ JOINT/BURSA W/O US: CPT | Performed by: FAMILY MEDICINE

## 2020-08-07 PROCEDURE — 4040F PNEUMOC VAC/ADMIN/RCVD: CPT | Performed by: FAMILY MEDICINE

## 2020-08-07 RX ORDER — LIDOCAINE HYDROCHLORIDE 10 MG/ML
3 INJECTION, SOLUTION INFILTRATION; PERINEURAL
Status: COMPLETED | OUTPATIENT
Start: 2020-08-07 | End: 2020-08-07

## 2020-08-07 RX ORDER — TRIAMCINOLONE ACETONIDE 40 MG/ML
80 INJECTION, SUSPENSION INTRA-ARTICULAR; INTRAMUSCULAR
Status: COMPLETED | OUTPATIENT
Start: 2020-08-07 | End: 2020-08-07

## 2020-08-07 RX ADMIN — TRIAMCINOLONE ACETONIDE 80 MG: 40 INJECTION, SUSPENSION INTRA-ARTICULAR; INTRAMUSCULAR at 11:29

## 2020-08-07 RX ADMIN — LIDOCAINE HYDROCHLORIDE 3 ML: 10 INJECTION, SOLUTION INFILTRATION; PERINEURAL at 11:29

## 2020-08-07 NOTE — PROGRESS NOTES
Assessment/Plan:    Popliteal cyst, right  - Patient c/o persistent right lower extremity pain and edema, with onset 2 months prior  - Patient received right lower limb venous duplex study on 08/05/2020 which revealed no vascular abnormalities  - Findings significant for well defined hypoechoic, non-vascularized cystic-type structure in the right popliteal fossa   - Patient received CSI of right knee in clinic  - Patient instructed to continue with lidocaine gel and to elevate lower extremities when at rest  - Will follow up with patient in two weeks to determine effectiveness of CSI  - Consider referral to orthopedics if pain worsens or is no longer tolerable     Primary osteoarthritis of right knee  - Patient endorses b/l knee pain more pronounced on right side  - Hx of b/l osteopenia in knees  - Knee xray (06/05/2020) showing degenerative bone disease in right knee: mild medial joint space narrowing with minimal osteophyte presence observed on imaging   - Patient is continuing with Tylenol 1000 mg BID with mild relief  - Consider PT referral if pain worsens or is no longer tolerable     Return in about 2 weeks (around 8/21/2020) for Recheck right knee        Diagnoses and all orders for this visit:    Primary osteoarthritis of right knee    Popliteal cyst, right  Large joint arthrocentesis: R knee  Date/Time: 8/7/2020 11:29 AM  Consent given by: patient  Site marked: site marked  Timeout: Immediately prior to procedure a time out was called to verify the correct patient, procedure, equipment, support staff and site/side marked as required   Supporting Documentation  Indications: pain and joint swelling   Procedure Details  Location: knee - R knee  Preparation: Patient was prepped and draped in the usual sterile fashion  Needle size: 25 G  Ultrasound guidance: no  Approach: anterolateral  Medications administered: 3 mL lidocaine 1 %; 80 mg triamcinolone acetonide 40 mg/mL    Patient tolerance: patient tolerated the procedure well with no immediate complications  Dressing:  Sterile dressing applied    Patient was explained that in the next 4 to 6 hours she will have pain relief secondary to anesthetic  She was advised that she may have increase in her pain within the next 6 to 24 hours as the anesthetic wears off  During this time she was advised to apply ice and use Tylenol for pain  Patient was explained that corticosteroid injection will kick in within 24 to 48 hours and her pain should significantly improve  Patient was advised that if her knee becomes hot, red, or swollen then she should return to the clinic as soon as possible or go to local ED for evaluation  Subjective:     Shraddha Lynne Allenhurst is a 71 y o  female who  has a past medical history of Hyperlipidemia and Osteopenia determined by x-ray who presented to the office today for worsening pain of her right knee  HPI  Patient states that she has been experiencing worsening pain in her right knee for the last 4 days  The pain has been progressively worsening for two months, is persistent in nature and rated 9/10 in severity  She reports that the pain has increased to a 10/10 since Monday and that she is unable to tolerate the severity  She admits to using lidocaine gel and Tylenol with only mild improvement of symptoms  She also states that she has been unable to present to work given her symptoms as the pain is exacerbated by prolonged standing  Patient denies SOB, chest pain or peripheral edema  Review of Systems   Constitutional: Negative for fever  HENT: Negative for congestion  Eyes: Negative for visual disturbance  Respiratory: Negative for chest tightness and shortness of breath  Cardiovascular: Negative for chest pain and leg swelling  Gastrointestinal: Negative for abdominal distention     Musculoskeletal: Positive for arthralgias (Patient endorses b/l knee pain more pronounced on the right side) and joint swelling (Patient reports b/l knee swelling more pronounced on the right side)  Skin: Negative for color change and rash  Neurological: Negative for dizziness, syncope and light-headedness  Objective:    /80 (BP Location: Left arm, Patient Position: Sitting, Cuff Size: Large)   Pulse 70   Temp (!) 97 1 °F (36 2 °C)   Resp 16   Wt 78 5 kg (173 lb)   SpO2 96%   BMI 31 64 kg/m²     Physical Exam  Constitutional:       Appearance: Normal appearance  HENT:      Head: Normocephalic and atraumatic  Eyes:      Extraocular Movements: Extraocular movements intact  Conjunctiva/sclera: Conjunctivae normal    Neck:      Musculoskeletal: Normal range of motion  Cardiovascular:      Rate and Rhythm: Normal rate and regular rhythm  Heart sounds: Normal heart sounds  Comments: Negative for b/l lower extremity pitting edema  Pulmonary:      Effort: Pulmonary effort is normal       Breath sounds: Normal breath sounds  Musculoskeletal: Normal range of motion  General: Swelling present  Right lower leg: Edema (Moderate right medial pateller joint effusion visualized on inspection ) present  Left lower leg: Edema (Mild left medial pateller joint effusion visualized on inspection) present  Comments:   - Positive for tenderness in the popliteal fossa on palpation of the knee  - Negative for any valgus/varus deformity   - Negative for anterior/posterior drawer test      Skin:     Findings: No erythema  Neurological:      General: No focal deficit present  Mental Status: She is alert and oriented to person, place, and time  Psychiatric:         Mood and Affect: Mood normal          Behavior: Behavior normal          Thought Content:  Thought content normal          Judgment: Judgment normal          Hellen Wolf MD  08/07/20  11:46 AM

## 2020-08-07 NOTE — ASSESSMENT & PLAN NOTE
- Patient endorses b/l knee pain more pronounced on right side  - Hx of b/l osteopenia in knees  - Knee xray (06/05/2020) showing degenerative bone disease in right knee: mild medial joint space narrowing with minimal osteophyte presence observed on imaging   - Patient is continuing with Tylenol 1000 mg BID with mild relief  - Consider PT referral if pain worsens or is no longer tolerable

## 2020-08-07 NOTE — LETTER
August 7, 2020     Patient: Shraddha Augustin   YOB: 1950   Date of Visit: 8/7/2020       To Whom it May Concern:    Shraddha Augustin is under my professional care  She is excused from work from 08/04/2020 to 08/07/2020  She was seen in my office on 8/7/2020  She is medically cleared and may return to work on 08/10/2020  If you have any questions or concerns, please don't hesitate to call           Sincerely,          Eleanor Roberson MD        CC: No Recipients

## 2020-08-07 NOTE — ASSESSMENT & PLAN NOTE
- Patient c/o persistent right lower extremity pain and edema, with onset 2 months prior  - Patient received right lower limb venous duplex study on 08/05/2020 which revealed no vascular abnormalities  - Findings significant for well defined hypoechoic, non-vascularized cystic-type structure in the right popliteal fossa   - Patient received CSI of right knee in clinic  - Patient instructed to continue with lidocaine gel and to elevate lower extremities when at rest  - Will follow up with patient in two weeks to determine effectiveness of CSI  - Consider referral to orthopedics if pain worsens or is no longer tolerable

## 2020-08-21 ENCOUNTER — OFFICE VISIT (OUTPATIENT)
Dept: FAMILY MEDICINE CLINIC | Facility: CLINIC | Age: 70
End: 2020-08-21

## 2020-08-21 VITALS
TEMPERATURE: 98.6 F | SYSTOLIC BLOOD PRESSURE: 128 MMHG | HEIGHT: 62 IN | RESPIRATION RATE: 18 BRPM | HEART RATE: 73 BPM | DIASTOLIC BLOOD PRESSURE: 80 MMHG | BODY MASS INDEX: 31.6 KG/M2 | WEIGHT: 171.7 LBS | OXYGEN SATURATION: 95 %

## 2020-08-21 DIAGNOSIS — M25.562 CHRONIC PAIN OF BOTH KNEES: ICD-10-CM

## 2020-08-21 DIAGNOSIS — M25.561 CHRONIC PAIN OF BOTH KNEES: ICD-10-CM

## 2020-08-21 DIAGNOSIS — Z78.0 POST-MENOPAUSAL: ICD-10-CM

## 2020-08-21 DIAGNOSIS — Z13.820 OSTEOPOROSIS SCREENING: ICD-10-CM

## 2020-08-21 DIAGNOSIS — Z12.31 BREAST CANCER SCREENING BY MAMMOGRAM: Primary | ICD-10-CM

## 2020-08-21 DIAGNOSIS — E66.09 CLASS 1 OBESITY DUE TO EXCESS CALORIES WITH SERIOUS COMORBIDITY AND BODY MASS INDEX (BMI) OF 31.0 TO 31.9 IN ADULT: ICD-10-CM

## 2020-08-21 DIAGNOSIS — G89.29 CHRONIC PAIN OF BOTH KNEES: ICD-10-CM

## 2020-08-21 PROBLEM — E66.811 CLASS 1 OBESITY DUE TO EXCESS CALORIES WITH SERIOUS COMORBIDITY AND BODY MASS INDEX (BMI) OF 31.0 TO 31.9 IN ADULT: Status: ACTIVE | Noted: 2018-08-27

## 2020-08-21 PROCEDURE — 3074F SYST BP LT 130 MM HG: CPT | Performed by: PHYSICIAN ASSISTANT

## 2020-08-21 PROCEDURE — 1036F TOBACCO NON-USER: CPT | Performed by: PHYSICIAN ASSISTANT

## 2020-08-21 PROCEDURE — 3008F BODY MASS INDEX DOCD: CPT | Performed by: PHYSICIAN ASSISTANT

## 2020-08-21 PROCEDURE — 99213 OFFICE O/P EST LOW 20 MIN: CPT | Performed by: PHYSICIAN ASSISTANT

## 2020-08-21 PROCEDURE — 1160F RVW MEDS BY RX/DR IN RCRD: CPT | Performed by: PHYSICIAN ASSISTANT

## 2020-08-21 PROCEDURE — 3079F DIAST BP 80-89 MM HG: CPT | Performed by: PHYSICIAN ASSISTANT

## 2020-08-21 PROCEDURE — 4040F PNEUMOC VAC/ADMIN/RCVD: CPT | Performed by: PHYSICIAN ASSISTANT

## 2020-08-21 NOTE — PROGRESS NOTES
Assessment/Plan:    Chronic pain of both knees  Doing well after injection    Class 1 obesity due to excess calories with serious comorbidity and body mass index (BMI) of 31 0 to 31 9 in adult  BMI Counseling: Body mass index is 31 4 kg/m²  The BMI is above normal  Exercise recommendations include moderate aerobic physical activity for 150 minutes/week  Problem List Items Addressed This Visit        Other    Class 1 obesity due to excess calories with serious comorbidity and body mass index (BMI) of 31 0 to 31 9 in adult     BMI Counseling: Body mass index is 31 4 kg/m²  The BMI is above normal  Exercise recommendations include moderate aerobic physical activity for 150 minutes/week  Chronic pain of both knees     Doing well after injection           Other Visit Diagnoses     Breast cancer screening by mammogram    -  Primary    Relevant Orders    Mammo screening bilateral w cad    Osteoporosis screening        Relevant Orders    DXA bone density spine hip and pelvis    Post-menopausal        Relevant Orders    DXA bone density spine hip and pelvis            Subjective:      Patient ID: Shraddha Aguilera is a 71 y o  female  HPI 70-year-old female here for 2 week follow-up for knee pain  She was seen here on the 7th and had a arthrocentesis of the right knee  She is doing much better  She is not taking anything for the pain  She does walk a lot at work  The following portions of the patient's history were reviewed and updated as appropriate:   She  has a past medical history of Echocardiogram abnormal (02/01/2016), History of mammogram (09/13/2017), History of mammogram (02/15/2016), and Osteopenia determined by x-ray (02/03/2016)    She   Patient Active Problem List    Diagnosis Date Noted    Primary osteoarthritis of right knee 08/07/2020    Popliteal cyst, right 08/07/2020    Pain and swelling of right lower extremity 07/17/2020    Chronic pain of both knees 06/01/2020    Varicose veins of both lower extremities 08/15/2019    Decreased hearing, left 05/02/2019    Class 1 obesity due to excess calories with serious comorbidity and body mass index (BMI) of 31 0 to 31 9 in adult 08/27/2018    Essential hypertension 01/25/2016    Hyperlipidemia 01/25/2016    Osteopenia determined by x-ray 01/25/2016     She  has a past surgical history that includes Hysterectomy  Her family history includes No Known Problems in her daughter, father, maternal grandfather, maternal grandmother, mother, paternal grandfather, paternal grandmother, and sister  She  reports that she has never smoked  She has never used smokeless tobacco  She reports current alcohol use of about 1 0 standard drinks of alcohol per week  She reports that she does not use drugs    Current Outpatient Medications   Medication Sig Dispense Refill    acetaminophen (TYLENOL) 500 mg tablet Take 2 tablets (1,000 mg total) by mouth 3 (three) times a day as needed for mild pain 30 tablet 0    amLODIPine (NORVASC) 10 mg tablet Take 1 tablet (10 mg total) by mouth daily 90 tablet 3    atorvastatin (Lipitor) 20 mg tablet Take 1 tablet (20 mg total) by mouth daily 90 tablet 3    Calcium Carbonate-Vitamin D 600-400 MG-UNIT per tablet       Calcium Carbonate-Vitamin D3 600-400 MG-UNIT TABS Take 1 tablet by mouth daily 90 tablet 3    fluticasone (FLONASE) 50 mcg/act nasal spray 1 spray into each nostril daily 1 Bottle 0    hydrochlorothiazide (HYDRODIURIL) 25 mg tablet Take 1 tablet (25 mg total) by mouth daily 90 tablet 3    lidocaine (LIDODERM) 5 % Apply 1 patch topically daily Remove & Discard patch within 12 hours or as directed by MD 30 patch 5    losartan (COZAAR) 100 MG tablet Take 1 tablet (100 mg total) by mouth daily 90 tablet 3    benzonatate (TESSALON) 200 MG capsule Take 1 capsule (200 mg total) by mouth 3 (three) times a day as needed for cough (Patient not taking: Reported on 8/7/2020) 20 capsule 0    Blood Pressure KIT Take BP reading daily and as needed  (Patient not taking: Reported on 8/21/2020) 1 each 0    guaiFENesin 200 MG tablet Take 2 tablets (400 mg total) by mouth every 4 (four) hours as needed for cough (Patient not taking: Reported on 8/7/2020) 30 suppository 0    lidocaine (XYLOCAINE) 2 % topical gel Apply topically as needed for moderate pain (Patient not taking: Reported on 8/7/2020) 1 Tube 1     No current facility-administered medications for this visit  Current Outpatient Medications on File Prior to Visit   Medication Sig    acetaminophen (TYLENOL) 500 mg tablet Take 2 tablets (1,000 mg total) by mouth 3 (three) times a day as needed for mild pain    amLODIPine (NORVASC) 10 mg tablet Take 1 tablet (10 mg total) by mouth daily    atorvastatin (Lipitor) 20 mg tablet Take 1 tablet (20 mg total) by mouth daily    Calcium Carbonate-Vitamin D 600-400 MG-UNIT per tablet     Calcium Carbonate-Vitamin D3 600-400 MG-UNIT TABS Take 1 tablet by mouth daily    fluticasone (FLONASE) 50 mcg/act nasal spray 1 spray into each nostril daily    hydrochlorothiazide (HYDRODIURIL) 25 mg tablet Take 1 tablet (25 mg total) by mouth daily    lidocaine (LIDODERM) 5 % Apply 1 patch topically daily Remove & Discard patch within 12 hours or as directed by MD    losartan (COZAAR) 100 MG tablet Take 1 tablet (100 mg total) by mouth daily    benzonatate (TESSALON) 200 MG capsule Take 1 capsule (200 mg total) by mouth 3 (three) times a day as needed for cough (Patient not taking: Reported on 8/7/2020)    Blood Pressure KIT Take BP reading daily and as needed   (Patient not taking: Reported on 8/21/2020)    guaiFENesin 200 MG tablet Take 2 tablets (400 mg total) by mouth every 4 (four) hours as needed for cough (Patient not taking: Reported on 8/7/2020)    lidocaine (XYLOCAINE) 2 % topical gel Apply topically as needed for moderate pain (Patient not taking: Reported on 8/7/2020)     No current facility-administered medications on file prior to visit  She is allergic to aspirin and penicillins       Review of Systems   Constitutional: Negative for activity change, appetite change, chills, fatigue and unexpected weight change  HENT: Negative for ear pain and hearing loss  Eyes: Negative for visual disturbance  Respiratory: Negative for cough and wheezing  Cardiovascular: Negative for chest pain  Gastrointestinal: Negative for abdominal pain, constipation, diarrhea and vomiting  Musculoskeletal: Negative for arthralgias, back pain and myalgias  Skin: Negative for rash  Neurological: Negative for dizziness and headaches  Psychiatric/Behavioral: Negative for behavioral problems  Objective:      /80 (BP Location: Left arm, Patient Position: Sitting, Cuff Size: Standard)   Pulse 73   Temp 98 6 °F (37 °C) (Temporal)   Resp 18   Ht 5' 2" (1 575 m)   Wt 77 9 kg (171 lb 11 2 oz)   SpO2 95%   BMI 31 40 kg/m²          Physical Exam  Vitals signs and nursing note reviewed  Constitutional:       Appearance: She is well-developed  HENT:      Head: Normocephalic and atraumatic  Right Ear: External ear normal       Left Ear: External ear normal       Nose: Nose normal    Eyes:      Conjunctiva/sclera: Conjunctivae normal    Neck:      Musculoskeletal: Normal range of motion and neck supple  Thyroid: No thyromegaly  Cardiovascular:      Rate and Rhythm: Normal rate and regular rhythm  Heart sounds: Normal heart sounds  No murmur  Pulmonary:      Effort: Pulmonary effort is normal  No respiratory distress  Breath sounds: Normal breath sounds  Abdominal:      General: Bowel sounds are normal       Palpations: Abdomen is soft  There is no mass  Tenderness: There is no abdominal tenderness  There is no guarding  Musculoskeletal: Normal range of motion  Lymphadenopathy:      Cervical: No cervical adenopathy  Skin:     General: Skin is warm  Neurological:      Mental Status: She is alert and oriented to person, place, and time     Psychiatric:         Mood and Affect: Mood normal          Behavior: Behavior normal

## 2020-08-21 NOTE — ASSESSMENT & PLAN NOTE
BMI Counseling: Body mass index is 31 4 kg/m²  The BMI is above normal  Exercise recommendations include moderate aerobic physical activity for 150 minutes/week

## 2020-11-09 ENCOUNTER — OFFICE VISIT (OUTPATIENT)
Dept: FAMILY MEDICINE CLINIC | Facility: CLINIC | Age: 70
End: 2020-11-09

## 2020-11-09 VITALS
DIASTOLIC BLOOD PRESSURE: 72 MMHG | OXYGEN SATURATION: 97 % | SYSTOLIC BLOOD PRESSURE: 130 MMHG | HEIGHT: 62 IN | BODY MASS INDEX: 32.2 KG/M2 | WEIGHT: 175 LBS | RESPIRATION RATE: 16 BRPM | TEMPERATURE: 97.2 F | HEART RATE: 75 BPM

## 2020-11-09 DIAGNOSIS — I83.813 VARICOSE VEINS OF BOTH LOWER EXTREMITIES WITH PAIN: ICD-10-CM

## 2020-11-09 DIAGNOSIS — M17.11 PRIMARY OSTEOARTHRITIS OF RIGHT KNEE: ICD-10-CM

## 2020-11-09 DIAGNOSIS — E78.2 MIXED HYPERLIPIDEMIA: ICD-10-CM

## 2020-11-09 DIAGNOSIS — Z23 ENCOUNTER FOR IMMUNIZATION: Primary | ICD-10-CM

## 2020-11-09 DIAGNOSIS — I10 ESSENTIAL HYPERTENSION: ICD-10-CM

## 2020-11-09 PROCEDURE — 90662 IIV NO PRSV INCREASED AG IM: CPT

## 2020-11-09 PROCEDURE — 99213 OFFICE O/P EST LOW 20 MIN: CPT | Performed by: INTERNAL MEDICINE

## 2020-11-09 PROCEDURE — 90471 IMMUNIZATION ADMIN: CPT

## 2020-11-09 RX ORDER — GLYCERIN .002; .002; .01 MG/MG; MG/MG; MG/MG
SOLUTION/ DROPS OPHTHALMIC
COMMUNITY
Start: 2020-09-04 | End: 2022-04-13

## 2020-11-09 RX ORDER — LOSARTAN POTASSIUM 100 MG/1
100 TABLET ORAL DAILY
Qty: 90 TABLET | Refills: 3 | Status: SHIPPED | OUTPATIENT
Start: 2020-11-09 | End: 2021-04-22 | Stop reason: SDUPTHER

## 2020-11-09 RX ORDER — HYDROCHLOROTHIAZIDE 25 MG/1
25 TABLET ORAL DAILY
Qty: 90 TABLET | Refills: 3 | Status: SHIPPED | OUTPATIENT
Start: 2020-11-09 | End: 2020-11-09 | Stop reason: ALTCHOICE

## 2020-11-09 RX ORDER — AMLODIPINE BESYLATE 10 MG/1
10 TABLET ORAL DAILY
Qty: 90 TABLET | Refills: 3 | Status: SHIPPED | OUTPATIENT
Start: 2020-11-09 | End: 2021-04-22 | Stop reason: SDUPTHER

## 2021-01-05 ENCOUNTER — OFFICE VISIT (OUTPATIENT)
Dept: FAMILY MEDICINE CLINIC | Facility: CLINIC | Age: 71
End: 2021-01-05

## 2021-01-05 VITALS
SYSTOLIC BLOOD PRESSURE: 128 MMHG | BODY MASS INDEX: 32.09 KG/M2 | TEMPERATURE: 96.5 F | WEIGHT: 174.4 LBS | HEIGHT: 62 IN | OXYGEN SATURATION: 93 % | RESPIRATION RATE: 20 BRPM | DIASTOLIC BLOOD PRESSURE: 76 MMHG | HEART RATE: 82 BPM

## 2021-01-05 DIAGNOSIS — R07.81 CHEST PAIN, PLEURITIC: ICD-10-CM

## 2021-01-05 DIAGNOSIS — E78.2 MIXED HYPERLIPIDEMIA: ICD-10-CM

## 2021-01-05 DIAGNOSIS — I10 ESSENTIAL HYPERTENSION: Primary | ICD-10-CM

## 2021-01-05 PROBLEM — R07.9 CHEST PAIN: Status: ACTIVE | Noted: 2021-01-05

## 2021-01-05 PROBLEM — R07.9 CHEST PAIN: Status: RESOLVED | Noted: 2021-01-05 | Resolved: 2021-01-05

## 2021-01-05 PROCEDURE — 93000 ELECTROCARDIOGRAM COMPLETE: CPT | Performed by: INTERNAL MEDICINE

## 2021-01-05 PROCEDURE — 99214 OFFICE O/P EST MOD 30 MIN: CPT | Performed by: INTERNAL MEDICINE

## 2021-01-05 RX ORDER — ACETAMINOPHEN 500 MG
500 TABLET ORAL EVERY 6 HOURS PRN
Qty: 30 TABLET | Refills: 3 | Status: SHIPPED | OUTPATIENT
Start: 2021-01-05

## 2021-01-05 RX ORDER — HYDROCHLOROTHIAZIDE 25 MG/1
25 TABLET ORAL DAILY
COMMUNITY
End: 2021-07-12 | Stop reason: ALTCHOICE

## 2021-01-05 RX ORDER — NITROGLYCERIN 0.4 MG/1
0.4 TABLET SUBLINGUAL
Qty: 30 TABLET | Refills: 0 | Status: SHIPPED | OUTPATIENT
Start: 2021-01-05 | End: 2022-04-13

## 2021-01-05 NOTE — ASSESSMENT & PLAN NOTE
One month onset  Pleuritic in nature   Relieved with rest  POCT EKG was normal   - Nitroglycerine 0 4 mg PRN  - Tylenol 500 mg q5 PRN  - Ordered stress test

## 2021-01-05 NOTE — ASSESSMENT & PLAN NOTE
Currently within goal as per [facility] guidelines  - continue with Norvasc 10 mg, daily  - refilled HCTZ, 25 mg, daily  - c/w Lipitor 20 mg, daily  - c/w losartan 100 mg, daily

## 2021-01-05 NOTE — PROGRESS NOTES
Assessment/Plan:    Essential hypertension  [place] [facility]  - continue with Norvasc 10 mg, daily  - refilled HCTZ, 25 mg, daily  - c/w Lipitor 20 mg, daily  - c/w losartan 100 mg, daily    Hyperlipidemia  Controlled as per last lipid panel 06/2020  - C/w Lipitor 20 mg, daily    Chest pain, pleuritic  One month onset  Pleuritic in nature  Relieved with rest  POCT EKG was normal   - Nitroglycerine 0 4 mg PRN  - Tylenol 500 mg q5 PRN  - Ordered stress test    Return in about 6 weeks (around 2/16/2021)  Diagnoses and all orders for this visit:    Essential hypertension    Mixed hyperlipidemia    Chest pain, pleuritic  -     POCT ECG  -     Stress test only, exercise; Future  -     nitroglycerin (NITROSTAT) 0 4 mg SL tablet; Place 1 tablet (0 4 mg total) under the tongue every 5 (five) minutes as needed for chest pain  -     acetaminophen (TYLENOL) 500 mg tablet; Take 1 tablet (500 mg total) by mouth every 6 (six) hours as needed for mild pain    Other orders  -     hydrochlorothiazide (HYDRODIURIL) 25 mg tablet; Take 25 mg by mouth daily        Subjective:     Shraddha Felder is a 79 y o  female who  has a past medical history of HTN, HLD, obesity, abnormal Echo, and Osteopenia who presented to the office today for chest pain  HPI  Patient states that she felt a pain in her left side for 1 month duration  She reports pain is exacerbated during the week while at work and improves over the weekend  The pain is moderate to mild in severity and radiates down her left arm  Pain is pleuritic in nature and relieved with rest      She reports mild and chronic swelling in her LE for the last year  Denies SOB, syncope, or peripheral neuropathy  Review of Systems   Constitutional: Negative for appetite change, fever and unexpected weight change  HENT: Negative for congestion, ear discharge and rhinorrhea  Eyes: Negative for visual disturbance     Respiratory: Negative for chest tightness and shortness of breath  Cardiovascular: Positive for chest pain (as per hpi)  Negative for palpitations  Gastrointestinal: Negative for abdominal distention, abdominal pain, constipation and diarrhea  Genitourinary: Negative for difficulty urinating, dysuria and frequency  Musculoskeletal: Negative for arthralgias and myalgias  Neurological: Negative for dizziness, syncope and light-headedness  Psychiatric/Behavioral: Negative for agitation, confusion and dysphoric mood  The patient is not nervous/anxious  Objective:    /76 (BP Location: Left arm, Patient Position: Sitting, Cuff Size: Large)   Pulse 82   Temp (!) 96 5 °F (35 8 °C) (Temporal)   Resp 20   Ht 5' 2" (1 575 m)   Wt 79 1 kg (174 lb 6 4 oz)   SpO2 93%   BMI 31 90 kg/m²     Physical Exam  Constitutional:       Appearance: Normal appearance  HENT:      Head: Normocephalic and atraumatic  Nose: Nose normal  No congestion or rhinorrhea  Mouth/Throat:      Mouth: Mucous membranes are moist       Pharynx: Oropharynx is clear  No oropharyngeal exudate or posterior oropharyngeal erythema  Eyes:      Conjunctiva/sclera: Conjunctivae normal    Neck:      Musculoskeletal: Normal range of motion  Cardiovascular:      Rate and Rhythm: Normal rate and regular rhythm  Heart sounds: Normal heart sounds  Pulmonary:      Effort: Pulmonary effort is normal       Breath sounds: Normal breath sounds  Chest:      Chest wall: No tenderness  Abdominal:      General: Bowel sounds are normal    Musculoskeletal: Normal range of motion  Neurological:      Mental Status: She is alert and oriented to person, place, and time  Psychiatric:         Mood and Affect: Mood normal          Behavior: Behavior normal         Adult ECG Report     Name: Shraddha Vicente   Age: 79 y o     Gender: female       Rate: 67   Rhythm: normal sinus rhythm   QRS Axis: -25   GA Interval: 158 ms   QRS Duration: 104   QTc: 448 ms   Conduction Disturbances: Nonspecific interventricular conduction delay   Other Abnormalities: Nonspecific T wave abnormalities     Narrative Interpretation: Normal EKG  Srinivasa Flores MD  01/06/21  8:32 PM [Heartburn] : heartburn [Negative] : Heme/Lymph [Melena] : no melena

## 2021-01-31 ENCOUNTER — IMMUNIZATIONS (OUTPATIENT)
Dept: FAMILY MEDICINE CLINIC | Facility: CLINIC | Age: 71
End: 2021-01-31

## 2021-01-31 DIAGNOSIS — Z23 ENCOUNTER FOR IMMUNIZATION: Primary | ICD-10-CM

## 2021-02-28 ENCOUNTER — IMMUNIZATIONS (OUTPATIENT)
Dept: FAMILY MEDICINE CLINIC | Facility: CLINIC | Age: 71
End: 2021-02-28

## 2021-02-28 DIAGNOSIS — Z23 ENCOUNTER FOR IMMUNIZATION: Primary | ICD-10-CM

## 2021-03-01 ENCOUNTER — HOSPITAL ENCOUNTER (OUTPATIENT)
Dept: MAMMOGRAPHY | Facility: CLINIC | Age: 71
Discharge: HOME | End: 2021-03-01
Payer: COMMERCIAL

## 2021-03-01 VITALS — HEIGHT: 62 IN | BODY MASS INDEX: 32.02 KG/M2 | WEIGHT: 174 LBS

## 2021-03-01 DIAGNOSIS — Z12.31 BREAST CANCER SCREENING BY MAMMOGRAM: ICD-10-CM

## 2021-03-01 PROCEDURE — 77067 SCR MAMMO BI INCL CAD: CPT

## 2021-03-01 PROCEDURE — 77063 BREAST TOMOSYNTHESIS BI: CPT

## 2021-03-02 ENCOUNTER — TELEMEDICINE (OUTPATIENT)
Dept: FAMILY MEDICINE CLINIC | Facility: CLINIC | Age: 71
End: 2021-03-02

## 2021-03-02 DIAGNOSIS — M25.551 RIGHT HIP PAIN: Primary | ICD-10-CM

## 2021-03-02 PROCEDURE — 99213 OFFICE O/P EST LOW 20 MIN: CPT | Performed by: INTERNAL MEDICINE

## 2021-03-02 NOTE — PROGRESS NOTES
Virtual Brief Visit    Assessment/Plan:    Problem List Items Addressed This Visit        Other    Right hip pain - Primary     Acute  Likely 2/2 trochanteric bursitis vs osteoarthritis  -Ordered xray of b/l hip  -Ordered dxa scan  -Continue with tylenol, lidocaine patch and topical gel  -Will call patient with results of imaging and dxa scan  -Will assess location of pain at in-person visit in two weeks time (i e ,gluteal/sciatic notch vs lateral hip/trochanteric bursa vs groin/true hip joint pain)           Relevant Orders    DXA bone density spine hip and pelvis    XR hip/pelv 2-3 vws right if performed    XR hip/pelv 2-3 vws left if performed                Reason for visit is No chief complaint on file  Encounter provider Dara Bauer MD    Provider located at [address] [address] [phone]    Recent Visits  Date Type Provider Dept   03/02/21 Telemedicine Dara Bauer MD  [facility]   Showing recent visits within past 7 days and meeting all other requirements     Future Appointments  No visits were found meeting these conditions  Showing future appointments within next 150 days and meeting all other requirements        After connecting through [address] [place] [address]  and patient was informed that this is not a secure, HIPAA-compliant platform  She agrees to proceed  , the patient was identified by name and date of birth  Shraddha Felder was informed that this is a telemedicine visit and that the visit is being conducted through telephone and patient was informed that this is not a secure, HIPAA-compliant platform  She agrees to proceed     My office door was closed  No one else was in the room  She acknowledged consent and understanding of privacy and security of the platform  The patient has agreed to participate and understands she can discontinue the visit at any time      Patient is aware this is a billable service  Subjective    Shraddha Conway is a 79 y o  female who presents with cc of right hip pain  HPI   This is a very pleasant patient who c/o right sided hip pain for 2 weeks duration  Pain is constant and fluctuates in severity (4/10 at best and 10/10 at worst)  Pain is felt strongest at the lateral hip and radiates down to her knee  Increased pain with weight bearing and 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)  Pain is alleviated with use of Tylenol and lidocaine patch/gel and exacerbated with prolonged standing  Patient denies any numbness or tingling, LE claudication, SOB, or chest pain  * was utilized for this visit THE [id number])      Past Medical History:   Diagnosis Date    Echocardiogram abnormal 02/01/2016    Mild LVH with normal systolic function EF > 63%  Grade I Diastolic dysfunction  Aortic sclerosis w/o significant stenosis  Mild insufficiency  Mild MR  Mild TR with normal pulmonary artery pressure   History of mammogram 09/13/2017    Benign    History of mammogram 02/15/2016    Benign    Osteopenia determined by x-ray 02/03/2016    osteopenia of the left femoral neck and normal bone marrow density of the left hip total  Osteopenia of the lumbar spine          Past Surgical History:   Procedure Laterality Date    HYSTERECTOMY      age 39       Current Outpatient Medications   Medication Sig Dispense Refill    acetaminophen (TYLENOL) 500 mg tablet Take 1 tablet (500 mg total) by mouth every 6 (six) hours as needed for mild pain 30 tablet 3    amLODIPine (NORVASC) 10 mg tablet Take 1 tablet (10 mg total) by mouth daily 90 tablet 3    Artificial Tears 0 2-0 2-1 % SOLN       atorvastatin (Lipitor) 20 mg tablet Take 1 tablet (20 mg total) by mouth daily 90 tablet 3    benzonatate (TESSALON) 200 MG capsule Take 1 capsule (200 mg total) by mouth 3 (three) times a day as needed for cough (Patient not taking: Reported on 8/7/2020) 20 capsule 0    Blood Pressure KIT Take BP reading daily and as needed  (Patient not taking: Reported on 8/21/2020) 1 each 0    Calcium Carbonate-Vitamin D 600-400 MG-UNIT per tablet       Calcium Carbonate-Vitamin D3 600-400 MG-UNIT TABS Take 1 tablet by mouth daily 90 tablet 3    diclofenac sodium (VOLTAREN) 1 % Apply 2 g topically 4 (four) times a day (Patient not taking: Reported on 1/5/2021) 1 Tube 1    fluticasone (FLONASE) 50 mcg/act nasal spray 1 spray into each nostril daily (Patient not taking: Reported on 1/5/2021) 1 Bottle 0    guaiFENesin 200 MG tablet Take 2 tablets (400 mg total) by mouth every 4 (four) hours as needed for cough (Patient not taking: Reported on 8/7/2020) 30 suppository 0    hydrochlorothiazide (HYDRODIURIL) 25 mg tablet Take 25 mg by mouth daily      lidocaine (LIDODERM) 5 % Apply 1 patch topically daily Remove & Discard patch within 12 hours or as directed by MD (Patient not taking: Reported on 1/5/2021) 30 patch 5    lidocaine (XYLOCAINE) 2 % topical gel Apply topically as needed for moderate pain (Patient not taking: Reported on 8/7/2020) 1 Tube 1    losartan (COZAAR) 100 MG tablet Take 1 tablet (100 mg total) by mouth daily 90 tablet 3    nitroglycerin (NITROSTAT) 0 4 mg SL tablet Place 1 tablet (0 4 mg total) under the tongue every 5 (five) minutes as needed for chest pain 30 tablet 0     No current facility-administered medications for this visit  Allergies   Allergen Reactions    Aspirin     Penicillins Other (See Comments) and Rash       Review of Systems   Constitutional: Negative for appetite change, fever and unexpected weight change  HENT: Negative for congestion, ear discharge and rhinorrhea  Eyes: Negative for visual disturbance  Respiratory: Negative for chest tightness and shortness of breath  Cardiovascular: Negative for chest pain and palpitations     Gastrointestinal: Negative for abdominal distention, abdominal pain, constipation and diarrhea  Genitourinary: Negative for difficulty urinating, dysuria and frequency  Musculoskeletal: Positive for arthralgias (as per hpi)  Negative for myalgias  Neurological: Negative for dizziness, syncope and light-headedness  Psychiatric/Behavioral: Negative for agitation, confusion and dysphoric mood  The patient is not nervous/anxious  There were no vitals filed for this visit  I spent 30 minutes directly with the patient during this visit    VIRTUAL VISIT DISCLAIMER    Shraddha Bauer acknowledges that she has consented to an online visit or consultation  She understands that the online visit is based solely on information provided by her, and that, in the absence of a face-to-face physical evaluation by the physician, the diagnosis she receives is both limited and provisional in terms of accuracy and completeness  This is not intended to replace a full medical face-to-face evaluation by the physician  Shraddha Bauer understands and accepts these terms

## 2021-03-03 PROBLEM — M25.551 RIGHT HIP PAIN: Status: ACTIVE | Noted: 2021-03-03

## 2021-03-03 NOTE — ASSESSMENT & PLAN NOTE
Acute  Likely 2/2 trochanteric bursitis vs osteoarthritis    -Ordered xray of b/l hip  -Ordered dxa scan  -Continue with tylenol, lidocaine patch and topical gel  -Will call patient with results of imaging and dxa scan  -Will assess location of pain at in-person visit in two weeks time (i e ,gluteal/sciatic notch vs lateral hip/trochanteric bursa vs groin/true hip joint pain)

## 2021-04-22 DIAGNOSIS — M85.80 OSTEOPENIA DETERMINED BY X-RAY: ICD-10-CM

## 2021-04-22 DIAGNOSIS — I10 ESSENTIAL HYPERTENSION: ICD-10-CM

## 2021-04-22 RX ORDER — LOSARTAN POTASSIUM 100 MG/1
100 TABLET ORAL DAILY
Qty: 90 TABLET | Refills: 3 | Status: SHIPPED | OUTPATIENT
Start: 2021-04-22 | End: 2022-07-18 | Stop reason: SDUPTHER

## 2021-04-22 RX ORDER — AMLODIPINE BESYLATE 10 MG/1
10 TABLET ORAL DAILY
Qty: 90 TABLET | Refills: 3 | Status: SHIPPED | OUTPATIENT
Start: 2021-04-22 | End: 2022-07-20

## 2021-04-22 NOTE — TELEPHONE ENCOUNTER
Pt came in and states she needs refill of following medications      Calcium Carbonate-Vitamin D 600-400 MG-UNIT per tablet     losartan (COZAAR) 100 MG tablet     amLODIPine (NORVASC) 10 mg tablet

## 2021-07-12 ENCOUNTER — OFFICE VISIT (OUTPATIENT)
Dept: FAMILY MEDICINE CLINIC | Facility: CLINIC | Age: 71
End: 2021-07-12

## 2021-07-12 VITALS
TEMPERATURE: 98 F | RESPIRATION RATE: 16 BRPM | HEIGHT: 62 IN | BODY MASS INDEX: 32.94 KG/M2 | HEART RATE: 80 BPM | DIASTOLIC BLOOD PRESSURE: 82 MMHG | OXYGEN SATURATION: 94 % | SYSTOLIC BLOOD PRESSURE: 116 MMHG | WEIGHT: 179 LBS

## 2021-07-12 DIAGNOSIS — M85.80 OSTEOPENIA DETERMINED BY X-RAY: ICD-10-CM

## 2021-07-12 DIAGNOSIS — E78.2 MIXED HYPERLIPIDEMIA: ICD-10-CM

## 2021-07-12 DIAGNOSIS — I10 ESSENTIAL HYPERTENSION: Primary | ICD-10-CM

## 2021-07-12 PROCEDURE — 99213 OFFICE O/P EST LOW 20 MIN: CPT | Performed by: FAMILY MEDICINE

## 2021-07-12 RX ORDER — HYDROCHLOROTHIAZIDE 25 MG/1
25 TABLET ORAL DAILY
Qty: 30 TABLET | Refills: 0 | Status: SHIPPED | OUTPATIENT
Start: 2021-07-12 | End: 2021-07-12

## 2021-07-12 NOTE — PROGRESS NOTES
Assessment/Plan:    Essential hypertension  Controlled  Reviewed BP goals with patient  Goal BP <140/90 as per JNC 8 guidelines  Patient congratulated on efforts  Continue to maintain healthy balanced diet with focus on low salt intake  Patient continues to c/o intermittent chest pain  Order for stress test placed at last visit however patient reports never receiving a call for appointment      - given optimized blood pressure and patient non-compliance, will discontinue HCTZ at this time  - continue with Norvasc 10 mg, daily  - c/w losartan 100 mg, daily  - SW referral for coordination of care and stress test appointment  Message also sent to referral team     Right hip pain  Acute  Likely 2/2 trochanteric bursitis vs osteoarthritis  Ordered xray of b/l hip and dxa scan at last visit however patient reports never receiving a call  - D/t language barriers, SW referral placed for coordination of care  - Message sent to referral team  -Continue with tylenol, lidocaine patch and topical gel  -Will call patient with results of imaging and dxa scan  -Will assess location of pain at in-person visit in two weeks time (i e ,gluteal/sciatic notch vs lateral hip/trochanteric bursa vs groin/true hip joint pain)        Return in about 2 weeks (around 7/26/2021) for shoulder pain  Diagnoses and all orders for this visit:    Essential hypertension  -     Discontinue: hydrochlorothiazide (HYDRODIURIL) 25 mg tablet; Take 1 tablet (25 mg total) by mouth daily  -     Ambulatory referral to social work care management program; Future    Osteopenia determined by x-ray  -     Ambulatory referral to social work care management program; Future          Subjective:     Shraddha Dwyer is a 79 y o  female who  has a past medical history of HTN, HLD, obesity, abnormal Echo, and Osteopenia who presented to the office today for follow up of hypertension      HPI  Patient denies chest pain, SOB, dizziness, lightheadedness, or syncope  Continues to have intermittent chest pain  Stress test was ordered at last visit however patient reports she never received a call  DXA scan also ordered at last visit for hip pain however patient reports she was never called for an appointment  Patient reports compliance with Losartan and Amlodipine however she does not take HCTZ everyday due to associated urinary frequency  No longer taking Lipitor as patient was advised by previous provider to stop taking d/t last lipid panel showing cholesterol wnl  Review of Systems   Constitutional: Negative for appetite change, fever and unexpected weight change  HENT: Negative for congestion, ear discharge and rhinorrhea  Eyes: Negative for visual disturbance  Respiratory: Negative for chest tightness and shortness of breath  Cardiovascular: Negative for chest pain and palpitations  Gastrointestinal: Negative for abdominal distention, abdominal pain, constipation and diarrhea  Genitourinary: Negative for difficulty urinating, dysuria and frequency  Musculoskeletal: Negative for myalgias  Neurological: Negative for dizziness, syncope and light-headedness  Psychiatric/Behavioral: Negative for agitation, confusion and dysphoric mood  The patient is not nervous/anxious  Objective:    /82 (BP Location: Left arm, Patient Position: Sitting, Cuff Size: Large)   Pulse 80   Temp 98 °F (36 7 °C) (Temporal)   Resp 16   Ht 5' 2" (1 575 m)   Wt 81 2 kg (179 lb)   SpO2 94%   BMI 32 74 kg/m²     Physical Exam  Constitutional:       Appearance: Normal appearance  HENT:      Head: Normocephalic and atraumatic  Nose: Nose normal    Eyes:      Conjunctiva/sclera: Conjunctivae normal    Cardiovascular:      Rate and Rhythm: Normal rate  Heart sounds: Normal heart sounds  Pulmonary:      Effort: Pulmonary effort is normal    Musculoskeletal:         General: Normal range of motion        Cervical back: Normal range of

## 2021-07-12 NOTE — ASSESSMENT & PLAN NOTE
Controlled  Reviewed BP goals with patient  Goal BP <140/90 as per JNC 8 guidelines  Patient congratulated on efforts  Continue to maintain healthy balanced diet with focus on low salt intake  Patient continues to c/o intermittent chest pain  Order for stress test placed at last visit however patient reports never receiving a call for appointment      - given optimized blood pressure and patient non-compliance, will discontinue HCTZ at this time  - continue with Norvasc 10 mg, daily  - c/w losartan 100 mg, daily  - SW referral for coordination of care and stress test appointment   Message also sent to referral team

## 2021-07-13 NOTE — ASSESSMENT & PLAN NOTE
Acute  Likely 2/2 trochanteric bursitis vs osteoarthritis  Ordered xray of b/l hip and dxa scan at last visit however patient reports never receiving a call      - D/t language barriers, SW referral placed for coordination of care  - Message sent to referral team  -Continue with tylenol, lidocaine patch and topical gel  -Will call patient with results of imaging and dxa scan  -Will assess location of pain at in-person visit in two weeks time (i e ,gluteal/sciatic notch vs lateral hip/trochanteric bursa vs groin/true hip joint pain)

## 2021-07-20 ENCOUNTER — APPOINTMENT (OUTPATIENT)
Dept: LAB | Facility: CLINIC | Age: 71
End: 2021-07-20
Payer: COMMERCIAL

## 2021-07-20 DIAGNOSIS — I10 ESSENTIAL HYPERTENSION: Primary | ICD-10-CM

## 2021-07-20 DIAGNOSIS — I10 ESSENTIAL HYPERTENSION: ICD-10-CM

## 2021-07-20 LAB
ALBUMIN SERPL BCP-MCNC: 3.5 G/DL (ref 3.5–5)
ALP SERPL-CCNC: 59 U/L (ref 46–116)
ALT SERPL W P-5'-P-CCNC: 20 U/L (ref 12–78)
ANION GAP SERPL CALCULATED.3IONS-SCNC: 5 MMOL/L (ref 4–13)
AST SERPL W P-5'-P-CCNC: 16 U/L (ref 5–45)
BASOPHILS # BLD AUTO: 0.09 THOUSANDS/ΜL (ref 0–0.1)
BASOPHILS NFR BLD AUTO: 1 % (ref 0–1)
BILIRUB SERPL-MCNC: 0.77 MG/DL (ref 0.2–1)
BUN SERPL-MCNC: 18 MG/DL (ref 5–25)
CALCIUM SERPL-MCNC: 9.3 MG/DL (ref 8.3–10.1)
CHLORIDE SERPL-SCNC: 105 MMOL/L (ref 100–108)
CHOLEST SERPL-MCNC: 223 MG/DL (ref 50–200)
CO2 SERPL-SCNC: 28 MMOL/L (ref 21–32)
CREAT SERPL-MCNC: 0.67 MG/DL (ref 0.6–1.3)
EOSINOPHIL # BLD AUTO: 0.1 THOUSAND/ΜL (ref 0–0.61)
EOSINOPHIL NFR BLD AUTO: 1 % (ref 0–6)
ERYTHROCYTE [DISTWIDTH] IN BLOOD BY AUTOMATED COUNT: 13.5 % (ref 11.6–15.1)
GFR SERPL CREATININE-BSD FRML MDRD: 103 ML/MIN/1.73SQ M
GLUCOSE P FAST SERPL-MCNC: 97 MG/DL (ref 65–99)
HCT VFR BLD AUTO: 43.2 % (ref 34.8–46.1)
HDLC SERPL-MCNC: 47 MG/DL
HGB BLD-MCNC: 14.3 G/DL (ref 11.5–15.4)
IMM GRANULOCYTES # BLD AUTO: 0.02 THOUSAND/UL (ref 0–0.2)
IMM GRANULOCYTES NFR BLD AUTO: 0 % (ref 0–2)
LDLC SERPL CALC-MCNC: 140 MG/DL (ref 0–100)
LYMPHOCYTES # BLD AUTO: 2.51 THOUSANDS/ΜL (ref 0.6–4.47)
LYMPHOCYTES NFR BLD AUTO: 34 % (ref 14–44)
MCH RBC QN AUTO: 30.9 PG (ref 26.8–34.3)
MCHC RBC AUTO-ENTMCNC: 33.1 G/DL (ref 31.4–37.4)
MCV RBC AUTO: 93 FL (ref 82–98)
MONOCYTES # BLD AUTO: 0.58 THOUSAND/ΜL (ref 0.17–1.22)
MONOCYTES NFR BLD AUTO: 8 % (ref 4–12)
NEUTROPHILS # BLD AUTO: 4.03 THOUSANDS/ΜL (ref 1.85–7.62)
NEUTS SEG NFR BLD AUTO: 56 % (ref 43–75)
NRBC BLD AUTO-RTO: 0 /100 WBCS
PLATELET # BLD AUTO: 275 THOUSANDS/UL (ref 149–390)
PMV BLD AUTO: 10.6 FL (ref 8.9–12.7)
POTASSIUM SERPL-SCNC: 3.2 MMOL/L (ref 3.5–5.3)
PROT SERPL-MCNC: 8.2 G/DL (ref 6.4–8.2)
RBC # BLD AUTO: 4.63 MILLION/UL (ref 3.81–5.12)
SODIUM SERPL-SCNC: 138 MMOL/L (ref 136–145)
TRIGL SERPL-MCNC: 182 MG/DL
WBC # BLD AUTO: 7.33 THOUSAND/UL (ref 4.31–10.16)

## 2021-07-20 PROCEDURE — 80053 COMPREHEN METABOLIC PANEL: CPT

## 2021-07-20 PROCEDURE — 80061 LIPID PANEL: CPT

## 2021-07-20 PROCEDURE — 36415 COLL VENOUS BLD VENIPUNCTURE: CPT

## 2021-07-20 PROCEDURE — 85025 COMPLETE CBC W/AUTO DIFF WBC: CPT

## 2021-07-20 NOTE — PROGRESS NOTES
Patient reports to clerical team that provider was supposed to order labs the last 2 times she was here and nothing has been ordered  Since patient hasn't had standard labs done in over 1 year and I don't see notes that would lead me suggest she needs anything else, I have placed orders at this time

## 2021-07-21 ENCOUNTER — TELEPHONE (OUTPATIENT)
Dept: FAMILY MEDICINE CLINIC | Facility: CLINIC | Age: 71
End: 2021-07-21

## 2021-07-21 RX ORDER — ATORVASTATIN CALCIUM 40 MG/1
40 TABLET, FILM COATED ORAL DAILY
Qty: 30 TABLET | Refills: 3 | Status: SHIPPED | OUTPATIENT
Start: 2021-07-21 | End: 2022-01-11

## 2021-07-21 NOTE — TELEPHONE ENCOUNTER
----- Message from Quiana Wesley MD sent at 7/21/2021  1:48 PM EDT -----  Regarding: Statin Therapy  Please call patient and let her know her labs came back and shower that her cholesterol is elevated and we will need to increase her Lipitor dose from 20 mg to 40mg  Order sent to pharmacy  Thank you

## 2021-07-25 PROBLEM — M25.519 SHOULDER PAIN: Status: ACTIVE | Noted: 2021-07-25

## 2021-07-25 NOTE — ASSESSMENT & PLAN NOTE
Left shoulder, onset 1 year ago  Shoulder pain etiology unclear due to multiple positive provocative tests  Differential also includes osteoarthritis of the left shoulder and deltoid muscle contusion  Will get a shoulder x-ray with zanca of view  Pain control with Tylenol  Patient has ASA allergy  May apply Voltaren gel to the shoulder 4 times a day  Use heat and ice to the affected area  Referral to physical therapy placed

## 2021-07-25 NOTE — PROGRESS NOTES
Assessment/Plan:    Shoulder pain  Left shoulder, onset 1 year ago  Shoulder pain etiology unclear due to multiple positive provocative tests  Differential also includes osteoarthritis of the left shoulder and deltoid muscle contusion  Will get a shoulder x-ray with zanca of view  Pain control with Tylenol  Patient has ASA allergy  May apply Voltaren gel to the shoulder 4 times a day  Use heat and ice to the affected area  Referral to physical therapy placed  No follow-ups on file  Diagnoses and all orders for this visit:    Chronic left shoulder pain  -     Diclofenac Sodium (VOLTAREN) 1 %; Apply 2 g topically 4 (four) times a day  -     Ambulatory referral to Physical Therapy; Future  -     XR shoulder 2+ vw left; Future    Other orders  -     Cancel: XR shoulder 2+ vw right; Future          Subjective:     Shraddha Huynh is a 79 y o  female who  has a past medical history of obesity and Osteopenia determined by x-ray who presented to the office today for shoulder pain  HPI    Shoulder Pain: Patient complaints of left shoulder pain  She is left hand dominant  This is evaluated as a personal injury  The pain is described as aching  The onset of the pain was gradual, starting about 1 year ago  The pain occurs when active and lasts 1 day  Location is anterior  Recurrent, intermittent, 0 episodes of dislocation  Symptoms are aggravated by repetitive use  Symptoms are diminished by voltaren gel  The following symptoms are reported: No stiffness, mild weakness, no swelling  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 for her symptoms  Review of Systems   Constitutional: Negative for chills and fever  HENT: Negative for congestion, ear pain, rhinorrhea and sinus pain  Eyes: Negative for visual disturbance  Respiratory: Negative for chest tightness, shortness of breath and wheezing  Cardiovascular: Negative for chest pain and palpitations  Gastrointestinal: Negative for abdominal pain, constipation, diarrhea and vomiting  Endocrine: Negative for polyuria  Genitourinary: Negative for dysuria  Musculoskeletal: Positive for arthralgias (as per hpi)  Negative for myalgias  Neurological: Negative for dizziness, syncope and light-headedness  Psychiatric/Behavioral: Negative for hallucinations, self-injury and suicidal ideas  Objective:    /84 (BP Location: Left arm, Patient Position: Sitting, Cuff Size: Standard)   Pulse 93   Temp 97 8 °F (36 6 °C) (Temporal)   Resp 18   Ht 5' 2" (1 575 m)   Wt 81 6 kg (180 lb)   SpO2 97%   BMI 32 92 kg/m²     Physical Exam  Constitutional:       Appearance: Normal appearance  HENT:      Head: Normocephalic and atraumatic  Nose: Nose normal    Eyes:      Conjunctiva/sclera: Conjunctivae normal    Cardiovascular:      Rate and Rhythm: Normal rate  Pulmonary:      Effort: Pulmonary effort is normal    Musculoskeletal:         General: No swelling  Cervical back: Normal range of motion  Skin:     General: Skin is warm and dry  Neurological:      Mental Status: She is alert and oriented to person, place, and time     Psychiatric:         Behavior: Behavior normal      Shoulder Exam  Appearance:  Normal with no swelling or bruising and no obvious joint deformity  Palpation/Tenderness:  Normal joint with no tenderness or effusions  Active Range of Motion: Patient endorsed pain on active and passive rotation:  Flexion: Minimally limited, Extension: Minimally limited, Abduction: Minimally limited, External Rotation: Minimally limited and Internal Rotation: Minimally limited  Special Tests:  Empty Can Test:  positive  Neer's Impingement Test:  negative     Karli Steven MD  07/27/21  6:34 AM

## 2021-07-26 ENCOUNTER — OFFICE VISIT (OUTPATIENT)
Dept: FAMILY MEDICINE CLINIC | Facility: CLINIC | Age: 71
End: 2021-07-26

## 2021-07-26 VITALS
TEMPERATURE: 97.8 F | HEART RATE: 93 BPM | DIASTOLIC BLOOD PRESSURE: 84 MMHG | SYSTOLIC BLOOD PRESSURE: 138 MMHG | HEIGHT: 62 IN | RESPIRATION RATE: 18 BRPM | WEIGHT: 180 LBS | OXYGEN SATURATION: 97 % | BODY MASS INDEX: 33.13 KG/M2

## 2021-07-26 DIAGNOSIS — M25.512 CHRONIC LEFT SHOULDER PAIN: Primary | ICD-10-CM

## 2021-07-26 DIAGNOSIS — G89.29 CHRONIC LEFT SHOULDER PAIN: Primary | ICD-10-CM

## 2021-07-26 PROCEDURE — 99213 OFFICE O/P EST LOW 20 MIN: CPT | Performed by: FAMILY MEDICINE

## 2021-07-30 ENCOUNTER — HOSPITAL ENCOUNTER (OUTPATIENT)
Dept: NON INVASIVE DIAGNOSTICS | Facility: HOSPITAL | Age: 71
Discharge: HOME | End: 2021-07-30
Payer: COMMERCIAL

## 2021-07-30 ENCOUNTER — HOSPITAL ENCOUNTER (OUTPATIENT)
Dept: BONE DENSITY | Facility: CLINIC | Age: 71
Discharge: HOME | End: 2021-07-30
Payer: COMMERCIAL

## 2021-07-30 DIAGNOSIS — R07.81 CHEST PAIN, PLEURITIC: ICD-10-CM

## 2021-07-30 DIAGNOSIS — M25.551 RIGHT HIP PAIN: ICD-10-CM

## 2021-07-30 DIAGNOSIS — Z13.820 ENCOUNTER FOR SCREENING FOR OSTEOPOROSIS: ICD-10-CM

## 2021-07-30 PROCEDURE — 77080 DXA BONE DENSITY AXIAL: CPT

## 2021-07-30 PROCEDURE — 93016 CV STRESS TEST SUPVJ ONLY: CPT | Performed by: INTERNAL MEDICINE

## 2021-07-30 PROCEDURE — 93017 CV STRESS TEST TRACING ONLY: CPT

## 2021-07-30 PROCEDURE — 93018 CV STRESS TEST I&R ONLY: CPT | Performed by: INTERNAL MEDICINE

## 2021-08-02 LAB
CHEST PAIN STATEMENT: NORMAL
MAX DIASTOLIC BP: 80 MMHG
MAX HEART RATE: 133 BPM
MAX PREDICTED HEART RATE: 150 BPM
MAX. SYSTOLIC BP: 152 MMHG
PROTOCOL NAME: NORMAL
TARGET HR FORMULA: NORMAL
TEST INDICATION: NORMAL
TIME IN EXERCISE PHASE: NORMAL

## 2021-08-03 NOTE — RESULT ENCOUNTER NOTE
PT already haves an Appt on 08/20/21  Called PT to give her a Reminder and the Reason of Her Appt  Appt was Confirmed

## 2021-08-20 ENCOUNTER — OFFICE VISIT (OUTPATIENT)
Dept: FAMILY MEDICINE CLINIC | Facility: CLINIC | Age: 71
End: 2021-08-20

## 2021-08-20 VITALS
OXYGEN SATURATION: 98 % | WEIGHT: 179 LBS | HEART RATE: 81 BPM | BODY MASS INDEX: 32.94 KG/M2 | DIASTOLIC BLOOD PRESSURE: 70 MMHG | HEIGHT: 62 IN | TEMPERATURE: 97.1 F | RESPIRATION RATE: 16 BRPM | SYSTOLIC BLOOD PRESSURE: 130 MMHG

## 2021-08-20 DIAGNOSIS — M81.0 AGE-RELATED OSTEOPOROSIS WITHOUT CURRENT PATHOLOGICAL FRACTURE: Primary | ICD-10-CM

## 2021-08-20 PROCEDURE — 99213 OFFICE O/P EST LOW 20 MIN: CPT | Performed by: FAMILY MEDICINE

## 2021-08-20 RX ORDER — ALENDRONATE SODIUM 70 MG/1
70 TABLET ORAL
Qty: 12 TABLET | Refills: 3 | Status: SHIPPED | OUTPATIENT
Start: 2021-08-20 | End: 2021-11-03 | Stop reason: SDUPTHER

## 2021-08-20 NOTE — PROGRESS NOTES
Assessment/Plan:    Age-related osteoporosis without current pathological fracture  No results for input(s): CALCIUM in the last 72 hours  No results for input(s): INTI32GYTFIY in the last 72 hours  DXA scan last performed 07/30/21  Demonstrates osteoporosis of the  left femoral neck with T score of -2 5  No history of pathological fracture  No Calcium and vitamin D levels on file  Currently on Calcium and Vit D Supplementation  Patient is a candidate for bisphosphonate therapy  No history of esophageal disorders, CKD, chani-en-y gastric bypass  Risks and benefits of bisphonates were discussed with the patient including adverse risks of severe bone, joint, muscle pain and osteonecrosis of the jaw  Advised to take with plain water only, not coffee, juice or mineral water, and to sit or stand upright for 30 minutes after ingestion  Follow up DXA in 2 years  Fall Assessment:    - She denies head trauma, LOC, dizziness, seizing, SOB, chest pain, N/V or palpitations  Pt Vital signs and physical examination are stable  Not on any anticoagulation or antiplatelet medications  No history Head trauma, Fractures and dislocations  Reports chronic history of b/l LE edema 2/2 prolonged standing   - EXT symmetric, no erythema, no tenderness or bruises  Gait normal   B/l LE edema noted on exam   - Neuro: AAOx3, strength 5/5 B/L UE&LE, Crude Sensation intact B/L, CN grossly intact  Patient with EOM deficits 2/2 cataract surgery - is following with optometrist  No focal deficits or signs of lateralization    - Discussed lifestyle modifications for treatment including calcium and vitamin D, exercise, counseling on fall prevention, and avoidance of heavy alcohol use  - Ordered Vit D and Calcium levels - will f/u with supplementation recommendations pending results      Swelling of both lower extremities  Chronic  Likely 2/2 venous insufficiency of the lower extremities bilaterally   Patient works in [employer]  and stand for prolonged periods of time, which exacerbates symptoms  Presentation: edema in b/l LE, pain in right LE  Lower Extremity Venous study (08/05/20):no evidence of acute or chronic DVT  Well defined hypoechoic non-vascularized cyst noted in right popliteal fossa, likely Baker's Cyst   VTE screen: No Personal/FmHx of DVT/P E , recent surgery, arthroplasty    Plan:  -Compression stockings, lower extremity elevation, decreased salt intake  -No anticoagulation warranted at this time   -Warm compresses and NSAIDs   -Vascular surgery for discussion of possible surgical management in future  Return today (on 8/20/2021) for Alendronate intiation    Diagnoses and all orders for this visit:    Age-related osteoporosis without current pathological fracture  -     Vitamin D 25 hydroxy; Future  -     Calcium; Future  -     alendronate (Fosamax) 70 mg tablet; Take 1 tablet (70 mg total) by mouth every 7 days          Subjective:     Shraddha Munguia is a 79 y o  female who  has a past medical history of Echocardiogram abnormal, History of mammogram, History of mammogram, and Osteopenia determined by x-ray  who presented to the office today for f/u of DXA scan  HPI    This is a very pleasant 79 y o  female who presents to the clinic for management of their chronic medical conditions  Patient's medical conditions are stable unless noted otherwise above  Patient has not had any recent hospitalizations, or medical emergencies since last visit  Patient has no further complaints other than what is mentioned in the ROS  [employer] Interpretor utilized for this visit [id number]    Review of Systems   Constitutional: Negative for appetite change, fever and unexpected weight change  HENT: Negative for congestion, ear discharge and rhinorrhea  Eyes: Negative for visual disturbance  Respiratory: Negative for chest tightness and shortness of breath  Cardiovascular: Negative for chest pain and palpitations  Gastrointestinal: Negative for abdominal distention, abdominal pain, constipation and diarrhea  Genitourinary: Negative for difficulty urinating, dysuria and frequency  Musculoskeletal: Negative for arthralgias and myalgias  Neurological: Negative for dizziness, syncope and light-headedness  Psychiatric/Behavioral: Negative for agitation, confusion and dysphoric mood  The patient is not nervous/anxious  Objective:    /70 (BP Location: Left arm, Patient Position: Sitting, Cuff Size: Large)   Pulse 81   Temp (!) 97 1 °F (36 2 °C) (Temporal)   Resp 16   Ht 5' 2" (1 575 m)   Wt 81 2 kg (179 lb)   SpO2 98%   Breastfeeding No   BMI 32 74 kg/m²     Physical Exam  Constitutional:       Appearance: Normal appearance  HENT:      Head: Normocephalic and atraumatic  Nose: Nose normal    Eyes:      Conjunctiva/sclera: Conjunctivae normal       Pupils: Pupils are equal, round, and reactive to light  Cardiovascular:      Rate and Rhythm: Normal rate and regular rhythm  Heart sounds: Normal heart sounds  Pulmonary:      Effort: Pulmonary effort is normal       Breath sounds: Normal breath sounds  Musculoskeletal:         General: Normal range of motion  Cervical back: Normal range of motion  Right lower leg: Edema (trace pitting to mid calf, uncahnged from prior) present  Left lower leg: Edema (trace pitting to mid-calf, unchanged from prior) present  Skin:     General: Skin is warm and dry  Neurological:      General: No focal deficit present  Mental Status: She is alert and oriented to person, place, and time     Psychiatric:         Behavior: Behavior normal          Danika Cat MD  08/20/21  9:46 AM

## 2021-08-20 NOTE — ASSESSMENT & PLAN NOTE
Chronic  Likely 2/2 venous insufficiency of the lower extremities bilaterally  Patient works in [employer]  and stand for prolonged periods of time, which exacerbates symptoms  Presentation: edema in b/l LE, pain in right LE  Lower Extremity Venous study (08/05/20):no evidence of acute or chronic DVT  Well defined hypoechoic non-vascularized cyst noted in right popliteal fossa, likely Baker's Cyst   VTE screen: No Personal/FmHx of DVT/P E , recent surgery, arthroplasty    Plan:  -Compression stockings, lower extremity elevation, decreased salt intake  -No anticoagulation warranted at this time   -Warm compresses and NSAIDs   -Vascular surgery for discussion of possible surgical management in future

## 2021-08-20 NOTE — LETTER
August 20, 2021     Patient: Shraddha Atkins   YOB: 1950   Date of Visit: 8/20/2021       To Whom it May Concern:    Shraddha Atkins is under my professional care  She was seen in my office on 8/20/2021  She may return to work on 08/24/21  If you have any questions or concerns, please don't hesitate to call           Sincerely,          Tony Zavala MD        CC: No Recipients

## 2021-08-20 NOTE — PATIENT INSTRUCTIONS
Osteoporosis   CUIDADO AMBULATORIO:   Osteoporosis La osteoporosis es [place] condición médica de lizett plazo que provoca que los huesos se pongan débiles, frágiles y propensos a las fracturas  La osteoporosis ocurre cuando el cuerpo absorbe más hueso del que produce  [place] también se debe a la falta de calcio y de estrógeno (hormona femenina)  Los síntomas más comunes [place] siguientes: Es posible que usted no tenga ningún signo o síntoma  Usted podría fracturarse un hueso después de un esfuerzo muscular, un golpe o lilibeth caída  La fractura usualmente ocurre en la cadera, en la columna vertebral o en la arin  Lilibeth vértebra que ha sufrido un colapso (hueso de la medula joseph) podría provocar un dolor severo en la espalda o pérdida de estatura por tener lilibeth postura doblada  Llame a martinez médico si:  · Usted tiene dolor intenso  · Presenta un dolor progresivo después de lilibeth caída  · Usted tiene dolor mientras hace parviz actividades diarias  · Usted tiene preguntas o inquietudes acerca de martinez condición o cuidado  Diagnóstico de osteoporosis:  · Exámenes de [facility] y [facility] miden el calcio, la vitamina D y los niveles de estrógeno  · Lilibeth radiografía o lilibeth tomografía computarizada podrían mostrar el debilitamiento de los huesos o lilibeth fractura  Es posible que le administren un medio de contraste que sirve para johnny con mayor claridad [place]  Dígale al médico si usted alguna vez ha tenido lilibeth reacción alérgica al líquido de [place]  No entre a la denea donde se realiza la resonancia magnética con algo de metal  El metal puede causar lesiones serias  Dígale al médico si usted tiene algo de metal dentro de martinez cuerpo o por encima  · Un examen de densidad ósea compara la densidad del hueso con lo que se espera de lilibeth persona de martinez edad, sexo y etnicidad         El tratamiento para la osteoporosis podría incluir medicamentos para evitar la pérdida de [place], para producir hueso nuevo y para aumentar el estrógeno  Estos medicamentos ayudan a evitar las fracturas y podrían administrarse en forma de píldora o inyección  Consulte con martinez médico para obtener más información [employer]  Evite la pérdida ósea:      · Consuma alimentos saludables ricos en calcio  [place] ayuda a [employer]  [place], el queso, el brócoli, el tofu, las almendras y el salmón y jony enlatados son buenas carpenter de calcio  · Aumenta el consumo de vitamina D  La vitamina D se encuentra en los aceites de pescado, en algunos vegetales y en la leche fortificada, los cereales y el pan  La vitamina D también se forma en la piel cuando es expuesta al sol  Pregunte a martinez médico cuál es la cantidad de [employer] que son seguros para usted  · [address] se le haya indicado  Pregunte a martinez médico sobre la cantidad de líquido que necesita rimma todos los días y cuáles le recomienda  No consuma alcohol ni cafeína Estos disminuyen la densidad mineral del hueso, lo cual puede [place]  · Realice actividad física con regularidad  Pregunte a martinez médico acerca del mejor plan de ejercicio para usted  Maegan ejercicios con carga trinidad 30 minutos, 3 veces por semana para ayudar a desarrollar y [employer]  · No fume  La nicotina y otras sustancias químicas que contienen los cigarrillos y cigarros pueden dañar los pulmones  Pida información a martinez médico si usted actualmente fuma y necesita ayuda para dejar de fumar  Los cigarrillos electrónicos o el tabaco sin humo igualmente contienen nicotina  Consulte con martinez médico antes de [employer]  · Vaya a terapia física según indicaciones  Un fisioterapeuta le enseñará ejercicios para mejorar el movimiento y la fuerza muscular  Acuda a la consulta de control con martinez médico según las indicaciones: Anote parviz preguntas para que se acuerde de hacerlas trinidad parviz visitas    © Copyright [facility] 2021 Information is for End User's use only and may not be sold, redistributed or otherwise used for commercial purposes  All illustrations and images included in CareNotes® are the copyrighted property of A D A M , Inc  or [address] [place] [address] es sólo para uso en educación  Martinez intención no es darle un consejo médico sobre enfermedades o tratamientos  Colsulte con martinez Dewain Racer farmacéutico antes de seguir cualquier régimen médico para saber si es seguro y efectivo para usted  Qumarline Cohen   CUIDADO AMBULATORIO:   Un quiste de [place] es lilibeth protuberancia abultada de líquido detrás de la rodilla  Un quiste de [place] puede desarrollarse si tiene lilibeth lesión en la rodilla, o lilibeth afección tiffani la osteoartritis o un trastorno del tejido conectivo  Un quiste de [employer] puede ser llamado quiste poplíteo  Los signos y síntomas comunes son:  · Un abultamiento o hinchazón detrás de martinez rodilla cuando se pone de pie o camina    · Inflamación en la rodilla que se arnaldo cuando usted dobla la rodilla    · Dolor de rodilla    · Rigidez o tiesura en martinez rodilla que podría empeorar con el movimiento    Busque atención médica de inmediato si:  · Usted tiene dolor intenso  · Usted tiene moretones en el tobillo debajo del [place]  · Martinez pantorrilla se ve azulada debajo del quiste  · Martinez pantorrilla o rodilla está inflamada o sangrando  Llame a martinez médico si:  · Tiene fiebre  · El dolor no mejora con medicamentos  · Usted tiene preguntas o inquietudes acerca de martinez condición o cuidado  El tratamiento podría no ser necesario  Un quiste clifford Guerra generalmente desaparece por martinez Sherald Abdirashid  Si es olvin y doloroso, puede que necesite cualquiera de los siguientes:  · [place], tiffani el ibuprofeno, [facility] a disminuir la inflamación, el dolor y la [place]  Los SAMANTHA pueden causar sangrado estomacal o problemas renales en ciertas personas   Si usted rosetta un medicamento anticoagulante, siempre pregúntele a martinez médico si los [place] son seguros para usted  Siempre hector la etiqueta de ramsey medicamento y [place] instrucciones  · Medicamentos esteroideos podrían inyectarse en el quiste para disminuir acumulación de líquido, enrojecimiento, dolor e inflamación  · Succión es un procedimiento usado para drenar líquido del quiste por medio de [place]  · Sid Hausen artroscópica se hace para extraer el quiste completamente o reparar un cartílago desgarrado o dañado  Un osciloscopio y pequeños instrumentos quirúrgicos son introducidos a través de lilibeth pequeña incisión en martinez rodilla  Un osciloscopio es un tubo visor flexible con latoya [place] y [place] lupa en la punta  Cuidado de martinez rodilla:  · Descanse tanto tiffani sea necesario  Limite movimiento mientras martinez rodilla se recupera  [place] ayudará a disminuir el riesgo de hacer más daño a martinez rodilla  Puede que usted necesite [employer] para quitar peso de la rodilla lesionada  Use las muletas tiffani se le indica  · Póngale hielo a martinez rodilla  El hielo ayuda a disminuir la inflamación y el dolor  Use lilibeth compresa de hielo o ponga hielo en lilibeth bolsa plástica  Envuelva el hielo con lilibeth toalla antes de colocarlo sobre martinez piel  Aplique el hielo sobre la rodilla de 15 a 20 minutos, de 3 a 4 veces al día  Maegan esto por 2 a 3 días  · Apoye martinez rodilla  Envuélvala con lilibeth venda elástica para darle apoyo  Pregúntele a martinez médico si usted necesita lilibeth abrazadera para más apoyo  [place] ayudará a disminuir la hinchazón y el movimiento para que martinez rodilla se sane  · Eleve martinez rodilla  Use almohadas para levantar martinez rodilla sobre el nivel de martinez corazón tan frecuentemente tiffani pueda  [place] ayudará a disminuir la hinchazón  No coloque la almohada kobe debajo de martinez rodilla  Colóquela debajo de la pantorrilla en martinez lugar  · Vaya a terapia física según indicaciones  Un fisioterapeuta le puede enseñar ejercicios para ayudarle a mejorar el movimiento y la fuerza, y para disminuir el dolor      Aguila Bodily a la consulta de control con martinez médico según las indicaciones: Anote parviz preguntas para que se acuerde de hacerlas trinidad parviz visitas  © Copyright [employer] 2021 Information is for End User's use only and may not be sold, redistributed or otherwise used for commercial purposes  All illustrations and images included in CareNotes® are the copyrighted property of A D A M [employer]  or [address] [place] [address] es sólo para uso en educación  Martinez intención no es darle un consejo médico sobre enfermedades o tratamientos  Colsulte con martinez Dewain High farmacéutico antes de seguir cualquier régimen médico para saber si es seguro y efectivo para usted

## 2021-08-20 NOTE — ASSESSMENT & PLAN NOTE
No results for input(s): CALCIUM in the last 72 hours  No results for input(s): [id number] in the last 72 hours  DXA scan last performed 07/30/21  Demonstrates osteoporosis of the  left femoral neck with T score of -2 5  No history of pathological fracture  No Calcium and vitamin D levels on file  Currently on Calcium and Vit D Supplementation  Patient is a candidate for bisphosphonate therapy  No history of esophageal disorders, CKD, chani-en-y gastric bypass  Risks and benefits of bisphonates were discussed with the patient including adverse risks of severe bone, joint, muscle pain and osteonecrosis of the jaw  Advised to take with plain water only, not coffee, juice or mineral water, and to sit or stand upright for 30 minutes after ingestion  Follow up DXA in 2 years  Fall Assessment:    - She denies head trauma, LOC, dizziness, seizing, SOB, chest pain, N/V or palpitations  Pt Vital signs and physical examination are stable  Not on any anticoagulation or antiplatelet medications  No history Head trauma, Fractures and dislocations  Reports chronic history of b/l LE edema 2/2 prolonged standing   - EXT symmetric, no erythema, no tenderness or bruises  Gait normal   B/l LE edema noted on exam   - Neuro: AAOx3, strength 5/5 B/L UE&LE, Crude Sensation intact B/L, CN grossly intact  Patient with EOM deficits 2/2 cataract surgery - is following with optometrist  No focal deficits or signs of lateralization    - Discussed lifestyle modifications for treatment including calcium and vitamin D, exercise, counseling on fall prevention, and avoidance of heavy alcohol use    - Ordered Vit D and Calcium levels - will f/u with supplementation recommendations pending results

## 2021-09-21 ENCOUNTER — OFFICE VISIT (OUTPATIENT)
Dept: FAMILY MEDICINE CLINIC | Facility: CLINIC | Age: 71
End: 2021-09-21

## 2021-09-21 ENCOUNTER — APPOINTMENT (OUTPATIENT)
Dept: LAB | Facility: HOSPITAL | Age: 71
End: 2021-09-21
Payer: COMMERCIAL

## 2021-09-21 VITALS
WEIGHT: 179 LBS | BODY MASS INDEX: 32.94 KG/M2 | OXYGEN SATURATION: 97 % | HEIGHT: 62 IN | DIASTOLIC BLOOD PRESSURE: 84 MMHG | HEART RATE: 81 BPM | SYSTOLIC BLOOD PRESSURE: 142 MMHG | RESPIRATION RATE: 16 BRPM | TEMPERATURE: 97.3 F

## 2021-09-21 DIAGNOSIS — K21.9 GASTROESOPHAGEAL REFLUX DISEASE WITHOUT ESOPHAGITIS: ICD-10-CM

## 2021-09-21 DIAGNOSIS — R05.3 CHRONIC COUGH: ICD-10-CM

## 2021-09-21 DIAGNOSIS — M81.0 AGE-RELATED OSTEOPOROSIS WITHOUT CURRENT PATHOLOGICAL FRACTURE: Primary | ICD-10-CM

## 2021-09-21 DIAGNOSIS — M81.0 AGE-RELATED OSTEOPOROSIS WITHOUT CURRENT PATHOLOGICAL FRACTURE: ICD-10-CM

## 2021-09-21 LAB
25(OH)D3 SERPL-MCNC: 16.4 NG/ML (ref 30–100)
CALCIUM SERPL-MCNC: 9.9 MG/DL (ref 8.4–10.2)

## 2021-09-21 PROCEDURE — 82306 VITAMIN D 25 HYDROXY: CPT

## 2021-09-21 PROCEDURE — 82310 ASSAY OF CALCIUM: CPT

## 2021-09-21 PROCEDURE — 36415 COLL VENOUS BLD VENIPUNCTURE: CPT

## 2021-09-21 PROCEDURE — 99213 OFFICE O/P EST LOW 20 MIN: CPT | Performed by: INTERNAL MEDICINE

## 2021-09-21 RX ORDER — OMEPRAZOLE 40 MG/1
40 CAPSULE, DELAYED RELEASE ORAL DAILY
Qty: 90 CAPSULE | Refills: 0 | Status: SHIPPED | OUTPATIENT
Start: 2021-09-21 | End: 2022-04-13

## 2021-09-21 NOTE — PROGRESS NOTES
Assessment/Plan:    Age-related osteoporosis without current pathological fracture  DXA scan last performed 07/30/21  Demonstrates osteoporosis of the  left femoral neck with T score of -2 5  No history of pathological fracture  No Calcium and vitamin D levels on file  Currently on Calcium and Vit D Supplementation  No history of esophageal disorders, CKD, chani-en-y gastric bypass      -Continue bisphosphonate therapy - reminded patient to take with plain water only, not coffee, juice or mineral water, and to sit or stand upright for 30 minutes after ingestion  Follow up DXA in 2 years  - Discussed lifestyle modifications for treatment including calcium and vitamin D, exercise, counseling on fall prevention, and avoidance of heavy alcohol use  - Ordered Vit D and Calcium levels at last visit - reminded patient to follow up at lab  Will f/u with supplementation recommendations pending results    Chronic cough  Chronic for one year  Differentials include post-nasal drip vs GERD  Will initiate trial of PPI and follow up in 6 weeks  - Omeprazole 40 mg 30 minutes before food in the morning for 6 weeks  - Will consider CXR if symptoms persist despite therapy      Return in about 6 weeks (around 11/2/2021) for chronic cough  Diagnoses and all orders for this visit:    Age-related osteoporosis without current pathological fracture    Gastroesophageal reflux disease without esophagitis  -     omeprazole (PriLOSEC) 40 MG capsule; Take 1 capsule (40 mg total) by mouth daily    Chronic cough          Subjective:     Candida Melissa Oppenheim is a 79 y o  female who  has a past medical history of HTN, HLD, Osteoarthritis, and Osteoporosis determined by x-ray who presented to the office today for f/u after Alendronate initiation  HPI    Patient denies dysphagia, reflux symptoms, rash, jaw claudications, new onset musculoskeletal pain, or recent fractures   Denies abdominal pain, nausea, dyspepsia, constipation, diarrhea, or excessive flatulence  Reports she is staying upright for at least 30-60 minutes after ingestion  Patient reports she feels overall much better and her pain symptoms have decreased  She reports a chronic cough that she has had for one year  Denies worsening symptoms when supine  Denies association with eating  Denies dysphagia, chest pain, or dyspnea  Review of Systems   Constitutional: Negative for chills and fever  HENT: Negative for congestion, ear pain, rhinorrhea and sinus pain  Eyes: Negative for visual disturbance  Respiratory: Negative for chest tightness, shortness of breath and wheezing  Cardiovascular: Negative for chest pain and palpitations  Gastrointestinal: Negative for abdominal pain, constipation, diarrhea and vomiting  Endocrine: Negative for polyuria  Genitourinary: Negative for dysuria  Musculoskeletal: Negative for myalgias  Neurological: Negative for syncope and light-headedness  Psychiatric/Behavioral: Negative for hallucinations, self-injury and suicidal ideas  Objective:    /84 (BP Location: Left arm, Patient Position: Sitting, Cuff Size: Standard)   Pulse 81   Temp (!) 97 3 °F (36 3 °C) (Temporal)   Resp 16   Ht 5' 2" (1 575 m)   Wt 81 2 kg (179 lb)   SpO2 97%   Breastfeeding No   BMI 32 74 kg/m²     Physical Exam  Constitutional:       Appearance: Normal appearance  HENT:      Head: Normocephalic and atraumatic  Nose: Nose normal    Eyes:      Conjunctiva/sclera: Conjunctivae normal    Cardiovascular:      Rate and Rhythm: Normal rate  Heart sounds: Normal heart sounds  Pulmonary:      Effort: Pulmonary effort is normal       Breath sounds: Normal breath sounds  Abdominal:      General: Bowel sounds are normal       Palpations: Abdomen is soft  Musculoskeletal:         General: Normal range of motion  Cervical back: Normal range of motion  Skin:     General: Skin is warm and dry     Neurological:      Mental Status: She is alert and oriented to person, place, and time     Psychiatric:         Behavior: Behavior normal          Morgan Corado MD  09/21/21  8:37 PM

## 2021-09-21 NOTE — LETTER
September 21, 2021     Patient: Shraddha Munguia   YOB: 1950   Date of Visit: 9/21/2021       To Whom it May Concern:    Shraddha Munguia is under my professional care  She was seen in my office on 9/21/2021  She may return to work on 9/22/2021  If you have any questions or concerns, please don't hesitate to call           Sincerely,          Carmelita Weaver MD        CC: No Recipients

## 2021-09-21 NOTE — ASSESSMENT & PLAN NOTE
DXA scan last performed 07/30/21  Demonstrates osteoporosis of the  left femoral neck with T score of -2 5  No history of pathological fracture  No Calcium and vitamin D levels on file  Currently on Calcium and Vit D Supplementation  No history of esophageal disorders, CKD, chani-en-y gastric bypass      -Continue bisphosphonate therapy - reminded patient to take with plain water only, not coffee, juice or mineral water, and to sit or stand upright for 30 minutes after ingestion  Follow up DXA in 2 years  - Discussed lifestyle modifications for treatment including calcium and vitamin D, exercise, counseling on fall prevention, and avoidance of heavy alcohol use  - Ordered Vit D and Calcium levels at last visit - reminded patient to follow up at lab   Will f/u with supplementation recommendations pending results

## 2021-09-21 NOTE — ASSESSMENT & PLAN NOTE
Chronic for one year  Differentials include post-nasal drip vs GERD  Will initiate trial of PPI and follow up in 6 weeks     - Omeprazole 40 mg 30 minutes before food in the morning for 6 weeks  - Will consider CXR if symptoms persist despite therapy

## 2021-09-30 DIAGNOSIS — E55.9 VITAMIN D DEFICIENCY: Primary | ICD-10-CM

## 2021-09-30 RX ORDER — ERGOCALCIFEROL 1.25 MG/1
50000 CAPSULE ORAL WEEKLY
Qty: 8 CAPSULE | Refills: 0 | Status: SHIPPED | OUTPATIENT
Start: 2021-09-30 | End: 2022-04-13

## 2021-09-30 RX ORDER — MELATONIN
2000 DAILY
Qty: 60 TABLET | Refills: 3 | Status: SHIPPED | OUTPATIENT
Start: 2021-11-30 | End: 2021-11-03 | Stop reason: SDUPTHER

## 2021-11-03 ENCOUNTER — OFFICE VISIT (OUTPATIENT)
Dept: FAMILY MEDICINE CLINIC | Facility: CLINIC | Age: 71
End: 2021-11-03

## 2021-11-03 VITALS
HEART RATE: 84 BPM | SYSTOLIC BLOOD PRESSURE: 134 MMHG | DIASTOLIC BLOOD PRESSURE: 80 MMHG | RESPIRATION RATE: 18 BRPM | WEIGHT: 177.7 LBS | TEMPERATURE: 97.7 F | BODY MASS INDEX: 32.7 KG/M2 | HEIGHT: 62 IN | OXYGEN SATURATION: 96 %

## 2021-11-03 DIAGNOSIS — M81.0 AGE-RELATED OSTEOPOROSIS WITHOUT CURRENT PATHOLOGICAL FRACTURE: ICD-10-CM

## 2021-11-03 DIAGNOSIS — R05.3 CHRONIC COUGH: Primary | ICD-10-CM

## 2021-11-03 DIAGNOSIS — E55.9 VITAMIN D DEFICIENCY: ICD-10-CM

## 2021-11-03 PROCEDURE — 99213 OFFICE O/P EST LOW 20 MIN: CPT | Performed by: FAMILY MEDICINE

## 2021-11-03 RX ORDER — MELATONIN
2000 DAILY
Qty: 60 TABLET | Refills: 3 | Status: SHIPPED | OUTPATIENT
Start: 2021-11-30 | End: 2022-07-18 | Stop reason: SDUPTHER

## 2021-11-03 RX ORDER — ALENDRONATE SODIUM 70 MG/1
70 TABLET ORAL
Qty: 12 TABLET | Refills: 3 | Status: SHIPPED | OUTPATIENT
Start: 2021-11-03

## 2021-11-08 ENCOUNTER — HOSPITAL ENCOUNTER (OUTPATIENT)
Dept: RADIOLOGY | Facility: HOSPITAL | Age: 71
Discharge: HOME | End: 2021-11-08
Payer: COMMERCIAL

## 2021-11-08 DIAGNOSIS — R05.3 CHRONIC COUGH: ICD-10-CM

## 2021-11-08 PROCEDURE — 71046 X-RAY EXAM CHEST 2 VIEWS: CPT

## 2021-11-16 ENCOUNTER — VBI (OUTPATIENT)
Dept: ADMINISTRATIVE | Facility: OTHER | Age: 71
End: 2021-11-16

## 2021-11-22 DIAGNOSIS — R05.3 CHRONIC COUGH: Primary | ICD-10-CM

## 2021-11-22 RX ORDER — LORATADINE 10 MG/1
10 TABLET ORAL DAILY
Qty: 14 TABLET | Refills: 1 | Status: SHIPPED | OUTPATIENT
Start: 2021-11-22

## 2021-11-22 RX ORDER — FLUTICASONE PROPIONATE 50 MCG
1 SPRAY, SUSPENSION (ML) NASAL DAILY
Qty: 11.1 ML | Refills: 0 | Status: SHIPPED | OUTPATIENT
Start: 2021-11-22

## 2022-01-10 DIAGNOSIS — E78.2 MIXED HYPERLIPIDEMIA: ICD-10-CM

## 2022-01-11 RX ORDER — ATORVASTATIN CALCIUM 40 MG/1
TABLET, FILM COATED ORAL
Qty: 30 TABLET | Refills: 0 | Status: SHIPPED | OUTPATIENT
Start: 2022-01-11 | End: 2022-02-23

## 2022-02-23 ENCOUNTER — TELEPHONE (OUTPATIENT)
Dept: FAMILY MEDICINE CLINIC | Facility: CLINIC | Age: 72
End: 2022-02-23

## 2022-02-23 DIAGNOSIS — E78.2 MIXED HYPERLIPIDEMIA: ICD-10-CM

## 2022-02-23 RX ORDER — ATORVASTATIN CALCIUM 40 MG/1
TABLET, FILM COATED ORAL
Qty: 30 TABLET | Refills: 0 | Status: SHIPPED | OUTPATIENT
Start: 2022-02-23 | End: 2022-04-13 | Stop reason: SDUPTHER

## 2022-04-13 ENCOUNTER — OFFICE VISIT (OUTPATIENT)
Dept: FAMILY MEDICINE CLINIC | Facility: CLINIC | Age: 72
End: 2022-04-13

## 2022-04-13 VITALS
RESPIRATION RATE: 16 BRPM | HEIGHT: 62 IN | SYSTOLIC BLOOD PRESSURE: 130 MMHG | HEART RATE: 88 BPM | TEMPERATURE: 97.4 F | BODY MASS INDEX: 32.2 KG/M2 | DIASTOLIC BLOOD PRESSURE: 82 MMHG | OXYGEN SATURATION: 97 % | WEIGHT: 175 LBS

## 2022-04-13 DIAGNOSIS — Z12.11 ENCOUNTER FOR SCREENING COLONOSCOPY: ICD-10-CM

## 2022-04-13 DIAGNOSIS — I10 ESSENTIAL HYPERTENSION: ICD-10-CM

## 2022-04-13 DIAGNOSIS — Z23 ENCOUNTER FOR IMMUNIZATION: ICD-10-CM

## 2022-04-13 DIAGNOSIS — E78.2 MIXED HYPERLIPIDEMIA: ICD-10-CM

## 2022-04-13 DIAGNOSIS — Z00.00 ENCOUNTER FOR ANNUAL PHYSICAL EXAM: Primary | ICD-10-CM

## 2022-04-13 PROCEDURE — 90662 IIV NO PRSV INCREASED AG IM: CPT | Performed by: FAMILY MEDICINE

## 2022-04-13 PROCEDURE — 99213 OFFICE O/P EST LOW 20 MIN: CPT | Performed by: FAMILY MEDICINE

## 2022-04-13 PROCEDURE — 90471 IMMUNIZATION ADMIN: CPT | Performed by: FAMILY MEDICINE

## 2022-04-13 RX ORDER — ATORVASTATIN CALCIUM 40 MG/1
40 TABLET, FILM COATED ORAL DAILY
Qty: 30 TABLET | Refills: 0 | Status: SHIPPED | OUTPATIENT
Start: 2022-04-13 | End: 2022-06-14 | Stop reason: SDUPTHER

## 2022-04-13 NOTE — ASSESSMENT & PLAN NOTE
Controlled  Reviewed BP goals with patient  Goal BP <140/90 as per JNC 8 guidelines  Patient congratulated on efforts  Continue to maintain healthy balanced diet with focus on low salt intake       - continue with Norvasc 10 mg, daily  - c/w losartan 100 mg, daily

## 2022-04-13 NOTE — PATIENT INSTRUCTIONS
COVID-19 Booster vaccine: [phone], option 7      Plan de alimentación con "enfoque dietético para detener la hipertensión (DASH, por parviz siglas en inglés)   LO QUE NECESITA SABER:   El plan de alimentación DASH está diseñado para ayudar a prevenir o disminuir la hipertensión  También puede ayudar a bajar el colesterol pardeep (colesterol LDL) y disminuir martinez riesgo de enfermedad cardíaca  El plan es bajo en sodio, azúcar, grasas dañinas, y grasas en martinez totalidad  Es alto en potasio, calcio, magnesio y Jessica  Estos nutrientes se agregan al consumir más frutas, vegetales y granos enteros  Con el plan de alimentación DASH, usted necesita consumir un número específico de porciones de cada nishi de alimentos  [place] le ayudará a consumir las cantidades suficientes de ciertos nutrientes y limitar otros  La cantidad de porciones que usted debe comer depende de la cantidad de calorías que usted necesita  Martinez dietista puede ayudarlo a crear planes de comidas con la cantidad [place] de porciones para cada nishi de alimentos  INSTRUCCIONES SOBRE EL [facility] HOSPITALARIA:   Lo que necesita saber acerca del sodio: Martinez dietista le indicará la cantidad de sodio que usted debe consumir a diario  La gente que tiene la presión arterial uday debe consumir de 1,500 a 2,300 mg de sodio al día tiffani milton  Lilibeth cucharadita (cdta) de sal tiene 2,300 mg de sodio  [place] puede parecer tiffani lilibeth meta difícil, bridget pequeños cambios en los alimentos que usted consume pueden hacer lilibeth gran diferencia  Martinez médico o dietista puede ayudarlo a crear un plan alimenticio que cumpla martinez límite de sodio  · Clare las etiquetas de los alimentos  Las etiquetas pueden ayudarle a escoger alimentos bajos en sodio  La cantidad de sodio está incluida en miligramos (mg)  La columna del porcentaje de valor diario indica la cantidad de necesidades diarias satisfechas con 1 porción del alimento para cada nutriente en la lista   Escoja alimentos que tengan menos de 5% del porcentaje diario de sodio  Estos alimentos se consideran bajos en sodio  Los alimentos que tienen 20% o más del porcentaje diario de sodio se consideran alimentos altos en sodio  Evite alimentos que tengan más de 300 mg de sodio por porción  Escoja alimentos Malik Ayala diga que son bajos en sodio, con sodio reducido, o sin sal agregada  · Limite la yoel agregada  No sale la comida en la sanchez si se añade sal al cocinar  Use hierbas y condimentos, tiffani cebollas, ajo y especias sin sal para agregar sabor  Use jugo de lima, louie o vinagre para agregar un sabor ácido  Use chiles picantes o lilibeth cantidad pequeña de salsa picante para agregar un sabor picante  Limite los alimentos con alto contenido de sal agregada, tiffani los siguientes:    ? Condimentos hechos con sal, tiffani sal de ajo, sal de apio, sal de cebolla, sal condimentada, suavizantes para taylor, y glutamato de monosodio (MSG, por parviz siglas en inglés)    ? Sopa Miso y mezclas para sopa enlatadas o secas    ? Salsa de soya regular, salsa de [address], salsa [place], salsa para bistec, [address], y [address]  vinagres con sabor    ? Alimentos para merendar, tiffani oneal tostadas, palomitas de [place], pretzels, piel de cerdo, galletas de soda saladas, y nueces saladas    ? Alimentos congelados, tiffani cenas, entradas, vegetales en salsa, y taylor empanizadas    · [employer] sustitutos para la sal  Pregúntele a martinez médico si es posible usar sustitutos de la sal  Algunos sustitutos de la sal vienen con ingredientes que pueden ser dañinos si usted tiene ciertos padecimientos médicos  · Escoja los alimentos cuidadosamente cuando sale a comer a restaurantes: las comidas de los restaurantes, sobre todo restaurantes de comida rápida, lindy siempre son altas en sodio  Algunos restaurantes ofrecen información nutricional que indica la cantidad de sodio en parviz alimentos   Pida que preparen parviz comidas con menos sal o sin sal     Lo que necesita saber acerca de las grasas: Las grasas insaturadas y los ácidos grasos omega-3 son ejemplos de grasas saludables  Las grasas no saludables incluyen las grasas saturadas y las grasas trans  · Incluya grasas saludables, tiffani las siguientes:     ? Aceites de cocina, tiffani el de soja, canola, [place] o [place]    ? Pescados grasos, tiffani el salmón, el atún, la caballa o las jony    ? Aceite de linaza o linaza molida    ? ½ taza de frijoles cocidos, tiffani frijoles negros, frijoles rojos o frijoles pintos    ? 1½ onzas de josette secos bajos en sodio, tiffani almendras o nueces    ? Mantequilla de maní baja en azúcar y sodio    ? [place], tiffani las de chía o [place]       · Limite o no consuma grasas poco saludables, tiffani los siguientes:     ? Alimentos que contienen grasa de origen animal, tiffani las taylor grasas, la [place], la [place] y la crema    ? Edwyna Sin en miguel, aceite de morales y aceite de dave     ? Aderezo de ensalada completo o cremoso    ? Sopa cremosa    ? Ferne Aly y productos de panadería elaborados con Jenniffer Magyar    ? Alimentos fritos con grasas poco saludables    ? Salsa de carne y salsas, tiffani la [place] o la de queso    Lo que necesita saber acerca de los carbohidratos: Todos los carbohidratos se descomponen en azúcar  Los carbohidratos complejos contienen más fibra que los simples  [place] significa que los carbohidratos complejos entran en el torrente sanguíneo más lentamente y causan menos picos de azúcar en la kristian  Intenta incluir más carbohidratos complejos y menos carbohidratos simples  · Incluya carbohidratos complejos, tiffani los siguientes:     ? 1 tajada de pan integral    ? 1 onza de cereal seco que no contenga azúcar añadida    ? ½ taza de te cocida    ? 2 onzas de pasta integral cocida    ?  ½ taza de arroz integral cocido    · Limite o no consuma carbohidratos simples, tiffani los siguientes:     ? Productos horneados, tiffani rosquillas, pasteles y galletas    ? Mezclas para pan de maíz y galletas    ? Guerline Dry y [address] de [facility], tiffani los [place] con queso de caja    ? Cereales instantáneos y fríos que contienen azúcar    ? Isabella Riedel y helado que contienen azúcar    ? Condimentos, tiffani el ketchup    ? Bebidas con alto contenido en azúcar, tiffani refrescos, limonadas y jugos de frutas    Lo que usted necesita saber [employer] verduras y las frutas: Las verduras y las frutas pueden ser frescas, congeladas o enlatadas  Si es posible, trate de elegir opciones enlatadas bajas en sodio  · Incluya lilibeth variedad de verduras y frutas, tiffani las siguientes:     ? 1 manzana, ashley o melocotón medianos (aproximadamente ½ taza picada)    ? ½ banana pequeña    ? ½ taza de bayas, tiffani arándanos, fresas o moras    ? 1 taza de verduras de [employer] crudas, tiffani [place], espinacas, col rizada o berza    ? ½ taza de verduras congeladas o enlatadas (sin sal agregada), tiffani judías verdes    ? ½ taza de fruta fresca, congelada o enlatada (enlatada en sirope liviano o jugo de fruta)    ? ½ taza de jugo de verduras o frutas    · Limite o no consuma verduras y frutas elaboradas de las siguientes maneras:     ? [place] congelada, tiffani las cerezas, con azúcar añadida    ? Abigailyosef Recinos en crema o salsa de [place]    ? Las verduras enlatadas son altas en sodio  ? Sauerkraut, vegetales en escabeche, y otros alimentos preparados con vinagre    ? Vegetales fritos o vegetales en mantequilla o salsas altas en grasas    Lo que necesita saber acerca de los alimentos con proteína:  · Incluya alimentos proteicos magros o bajos en grasa, tiffani los siguientes:     ? Robert scott (tonylico gibson) sin piel    ? Pescado (sobre todo pescado con grasa, tiffani salmón, atún fresco o caballa)    ? Carne de res o de cerdo magra (Ha Asya extra [place])    ? Claras de huevo y sustitutos del huevo    ? [address] 1%    ?  1½ onzas de queso descremado o bajo en grasas    ? 6 onzas de yogurt descremado o bajo en grasas    · Limite o no consuma alimentos con alto contenido de proteínas, tiffani los siguientes     ? Robert ahumadas o curadas, tiffani carne preparada con [place], [place], jamón, perros calientes, y salchichas    ? Frijoles enlatados y robert enlatadas o en pasta, tiffani robert en conserva, jony, anchoas y mariscos de imitación    ? Robert para emparedado, tiffani [place], [place], [place], y carne en [place]    ? Robert altas en grasas (biste estilo T-bone, carne molida para hamburguesas, costillas)    ? Huevos enteros y yemas de [place]    ? Ali Hoops, leche al 2% y crema    ? Queso normal y queso fundido    Otras pautas que debe seguir:  · Mantenga un peso saludable  Martinez riesgo de enfermedad cardíaca es aún más alto si usted tiene sobrepeso  Martinez médico podría sugerirle que adelgace si tiene sobrepeso  Usted puede perder peso si se propone consumir menos calorías y alimentos que tengan azúcar y grasas agregadas  El plan de alimentación DASH puede ayudarle a lograrlo  Latha buena forma de disminuir el consumo de calorías es consumiendo porciones más pequeñas en cada comida y menos meriendas entre comidas  Consulte a martinez médico para obtener más información sobre cómo adelgazar  · Realice actividad física con regularidad  El ejercicio regular puede ayudarle a alcanzar o mantener un peso saludable  El ejercicio regular también puede ayudarle a disminuir martinez presión arterial y mejorar parviz niveles de colesterol  Maegan ejercicios moderados por 30 minutos o más todos los días de la [place]  Para bajar peso, asegúrese de ejercitarse por lo menos 60 minutos  Consulte con martinez médico sobre un programa de ejercicio adecuado para usted  · Limite el consumo de alcohol  Las mujeres deberían limitar el consumo de alcohol a 1 bebida por día  Los hombres deberían limitar el consumo de alcohol a 2 tragos al día   Un trago equivale a 12 onzas de cerveza, 5 onzas de vino o 1 onza y ½ de licor     Para más información:  · [facility] [address]  [address] [address]  [place] [address]  Phone: [phone]  Web Address: [facility] no    © 244[facility] 2022 Information is for End User's use only and may not be sold, redistributed or otherwise used for commercial purposes  All illustrations and images included in CareNotes® are the copyrighted property of A D A M , [facility]  or [address] [place] [address] es sólo para uso en educación  Martinez intención no es darle un consejo médico sobre enfermedades o tratamientos  Colsulte con martinez Phelps Severance farmacéutico antes de seguir cualquier régimen médico para saber si es seguro y efectivo para usted

## 2022-04-13 NOTE — PROGRESS NOTES
Assessment/Plan:    Essential hypertension  Controlled  Reviewed BP goals with patient  Goal BP <140/90 as per JNC 8 guidelines  Patient congratulated on efforts  Continue to maintain healthy balanced diet with focus on low salt intake  - continue with Norvasc 10 mg, daily  - c/w losartan 100 mg, daily    Encounter for annual physical exam  -CRC screening: No personal or family history of colon cancer or colon polyps  Patient reports colonoscopy completed in [place] 5 years ago which was normal  Advised patient to sign release form so as to obtain previous medical records  Patient agreeable    -BrCa screening: There is no personal or family history of breast cancer  She denies finding new breast lumps, breast pain or nipple discharge  Referral for mammogram at  as per USPSTF  -[facility]S screening: Patient denies any exertional chest pain, dyspnea, palpitations, syncope, orthopnea, edema or paroxysmal nocturnal dyspnea  -DM screening: No polyuria, polydipsia, blurry vision, chest pain, dyspnea or claudication  No foot burning, numbness or pain  - Immunizations: Pt to receive Flu vaccine today  Patient provided with [employer] vaccine hotline to book booster  In addition to the above, the patient was counseled on general preventative health care subjects, including but not limited to:  - Nutrition, healthy weight, aerobic and weight-bearing exercise  - Mental health, social support, and self care  - Advised of the importance of dental hygiene and routine dental visits  Age-related osteoporosis without current pathological fracture  DXA scan last performed 07/30/21  Demonstrates osteoporosis of the  left femoral neck with T score of -2 5  No history of pathological fracture  Currently on Calcium and Vit D Supplementation   No history of esophageal disorders, CKD, chani-en-y gastric bypass      -Continue bisphosphonate therapy - reminded patient to take with plain water only, not coffee, juice or mineral water, and to sit or stand upright for 30 minutes after ingestion  Follow up DXA in 2 years  Return in about 3 months (around 7/13/2022) for htn  Diagnoses and all orders for this visit:    Encounter for annual physical exam  -     Mammo screening bilateral w 3d & cad; Future    Encounter for immunization  -     influenza vaccine, high-dose, PF 0 7 mL (FLUZONE HIGH-DOSE)    Encounter for screening colonoscopy    Essential hypertension    Mixed hyperlipidemia  -     atorvastatin (LIPITOR) 40 mg tablet; Take 1 tablet (40 mg total) by mouth daily          Subjective:     Shraddha Russ is a 70 y o  female who  has a past medical history of Echocardiogram abnormal, History of mammogram, History of mammogram, and Osteopenia determined by x-ray  who presented to the office today for annual physical      HPI    This is a very pleasant 70 y o  female who presents to the clinic for management of their chronic medical conditions  Patient's medical conditions are stable unless noted otherwise above  Patient has not had any recent hospitalizations, or medical emergencies since last visit  Patient has no further complaints other than what is mentioned in the ROS  Review of Systems   Constitutional: Negative for chills and fever  HENT: Negative for congestion, ear pain, rhinorrhea and sinus pain  Eyes: Negative for visual disturbance  Respiratory: Negative for chest tightness, shortness of breath and wheezing  Cardiovascular: Negative for chest pain and palpitations  Gastrointestinal: Negative for abdominal pain, constipation, diarrhea and vomiting  Endocrine: Negative for polyuria  Genitourinary: Negative for dysuria  Musculoskeletal: Negative for myalgias  Neurological: Negative for dizziness, syncope and light-headedness  Psychiatric/Behavioral: Negative for hallucinations, self-injury and suicidal ideas           Objective:    /82 (BP Location: Left arm, Patient Position: Sitting, Cuff Size: Large)   Pulse 88   Temp (!) 97 4 °F (36 3 °C) (Temporal)   Resp 16   Ht 5' 2" (1 575 m)   Wt 79 4 kg (175 lb)   SpO2 97%   Breastfeeding No   BMI 32 01 kg/m²     Physical Exam  Constitutional:       Appearance: Normal appearance  HENT:      Head: Normocephalic and atraumatic  Right Ear: Tympanic membrane, ear canal and external ear normal       Left Ear: Tympanic membrane, ear canal and external ear normal       Nose: Nose normal    Eyes:      Conjunctiva/sclera: Conjunctivae normal    Cardiovascular:      Rate and Rhythm: Normal rate and regular rhythm  Pulmonary:      Effort: Pulmonary effort is normal    Musculoskeletal:         General: Normal range of motion  Cervical back: Normal range of motion  Right lower leg: Edema (trace to mid calf, chronic ) present  Left lower leg: Edema (trace to mid calf, chronic ) present  Skin:     General: Skin is warm and dry  Neurological:      Mental Status: She is alert and oriented to person, place, and time     Psychiatric:         Behavior: Behavior normal          Talia Perez MD  04/13/22  1:49 PM

## 2022-04-13 NOTE — ASSESSMENT & PLAN NOTE
-CRC screening: No personal or family history of colon cancer or colon polyps  Patient reports colonoscopy completed in [place] 5 years ago which was normal  Advised patient to sign release form so as to obtain previous medical records  Patient agreeable    -BrCa screening: There is no personal or family history of breast cancer  She denies finding new breast lumps, breast pain or nipple discharge  Referral for mammogram at 48 as per USPSTF  -CVS screening: Patient denies any exertional chest pain, dyspnea, palpitations, syncope, orthopnea, edema or paroxysmal nocturnal dyspnea  -DM screening: No polyuria, polydipsia, blurry vision, chest pain, dyspnea or claudication  No foot burning, numbness or pain  - Immunizations: Pt to receive Flu vaccine today  Patient provided with [employer] vaccine hotline to book booster  In addition to the above, the patient was counseled on general preventative health care subjects, including but not limited to:  - Nutrition, healthy weight, aerobic and weight-bearing exercise  - Mental health, social support, and self care  - Advised of the importance of dental hygiene and routine dental visits

## 2022-04-18 ENCOUNTER — TELEPHONE (OUTPATIENT)
Dept: FAMILY MEDICINE CLINIC | Facility: CLINIC | Age: 72
End: 2022-04-18

## 2022-06-14 DIAGNOSIS — E78.2 MIXED HYPERLIPIDEMIA: ICD-10-CM

## 2022-06-14 NOTE — TELEPHONE ENCOUNTER
Patient came in and requested refill of following medication        atorvastatin (LIPITOR) 40 mg tablet

## 2022-06-15 RX ORDER — ATORVASTATIN CALCIUM 40 MG/1
40 TABLET, FILM COATED ORAL DAILY
Qty: 30 TABLET | Refills: 0 | Status: SHIPPED | OUTPATIENT
Start: 2022-06-15 | End: 2022-07-18 | Stop reason: SDUPTHER

## 2022-06-18 ENCOUNTER — HOSPITAL ENCOUNTER (OUTPATIENT)
Dept: MAMMOGRAPHY | Facility: CLINIC | Age: 72
Discharge: HOME | End: 2022-06-18
Payer: MEDICARE

## 2022-06-18 VITALS — HEIGHT: 62 IN | WEIGHT: 175 LBS | BODY MASS INDEX: 32.2 KG/M2

## 2022-06-18 DIAGNOSIS — Z00.00 ENCOUNTER FOR ANNUAL PHYSICAL EXAM: ICD-10-CM

## 2022-06-18 DIAGNOSIS — Z12.31 ENCOUNTER FOR SCREENING MAMMOGRAM FOR MALIGNANT NEOPLASM OF BREAST: ICD-10-CM

## 2022-06-18 PROCEDURE — 77063 BREAST TOMOSYNTHESIS BI: CPT

## 2022-06-18 PROCEDURE — 77067 SCR MAMMO BI INCL CAD: CPT

## 2022-07-14 ENCOUNTER — OFFICE VISIT (OUTPATIENT)
Dept: FAMILY MEDICINE CLINIC | Facility: CLINIC | Age: 72
End: 2022-07-14

## 2022-07-14 VITALS
WEIGHT: 174 LBS | HEIGHT: 62 IN | TEMPERATURE: 97.6 F | SYSTOLIC BLOOD PRESSURE: 128 MMHG | OXYGEN SATURATION: 95 % | BODY MASS INDEX: 32.02 KG/M2 | RESPIRATION RATE: 18 BRPM | DIASTOLIC BLOOD PRESSURE: 78 MMHG | HEART RATE: 88 BPM

## 2022-07-14 DIAGNOSIS — I10 ESSENTIAL HYPERTENSION: Primary | ICD-10-CM

## 2022-07-14 DIAGNOSIS — Z12.11 COLON CANCER SCREENING: ICD-10-CM

## 2022-07-14 DIAGNOSIS — M25.551 RIGHT HIP PAIN: ICD-10-CM

## 2022-07-14 DIAGNOSIS — M81.0 AGE-RELATED OSTEOPOROSIS WITHOUT CURRENT PATHOLOGICAL FRACTURE: ICD-10-CM

## 2022-07-14 PROCEDURE — 99213 OFFICE O/P EST LOW 20 MIN: CPT | Performed by: FAMILY MEDICINE

## 2022-07-14 NOTE — PROGRESS NOTES
Assessment/Plan:    Essential hypertension  Controlled  Reviewed BP goals with patient  Goal BP <140/90 as per JNC 8 guidelines  Patient congratulated on efforts  Continue to maintain healthy balanced diet with focus on low salt intake  - continue with Norvasc 10 mg, daily  - c/w losartan 100 mg, daily      Return in about 3 months (around 10/14/2022) for Annual physical 40 mins  Diagnoses and all orders for this visit:    Essential hypertension    Right hip pain    Colon cancer screening  -     Ambulatory Referral to Gastroenterology; Future    Age-related osteoporosis without current pathological fracture  -     Calcium Carbonate-Vitamin D 600-400 MG-UNIT per tablet; Take 1 tablet by mouth daily          Subjective:     Shraddha Colón is a 70 y o  female who  has a past medical history of Echocardiogram abnormal, History of mammogram, History of mammogram, and Osteopenia determined by x-ray  who presented to the office today for f/u htn  HPI    This is a very pleasant 70 y o  female who presents to the clinic for management of their chronic medical conditions  Patient's medical conditions are stable unless noted otherwise above  Patient has not had any recent hospitalizations, or medical emergencies since last visit  Patient has no further complaints other than what is mentioned in the ROS  Review of Systems   Constitutional: Negative for chills and fever  HENT: Negative for congestion, ear pain, rhinorrhea and sinus pain  Eyes: Negative for visual disturbance  Respiratory: Negative for chest tightness, shortness of breath and wheezing  Cardiovascular: Negative for chest pain and palpitations  Gastrointestinal: Negative for abdominal pain, constipation, diarrhea and vomiting  Endocrine: Negative for polyuria  Genitourinary: Negative for dysuria  Musculoskeletal: Negative for myalgias  Neurological: Negative for dizziness, syncope and light-headedness  Psychiatric/Behavioral: Negative for hallucinations, self-injury and suicidal ideas  Objective:    /78 (BP Location: Left arm, Patient Position: Sitting, Cuff Size: Standard)   Pulse 88   Temp 97 6 °F (36 4 °C) (Temporal)   Resp 18   Ht 5' 2" (1 575 m)   Wt 78 9 kg (174 lb)   SpO2 95%   Breastfeeding No   BMI 31 83 kg/m²     Physical Exam  Constitutional:       Appearance: Normal appearance  HENT:      Head: Normocephalic and atraumatic  Nose: Nose normal    Eyes:      Conjunctiva/sclera: Conjunctivae normal    Cardiovascular:      Rate and Rhythm: Normal rate  Heart sounds: Normal heart sounds  Pulmonary:      Effort: Pulmonary effort is normal       Breath sounds: Normal breath sounds  Musculoskeletal:         General: Normal range of motion  Cervical back: Normal range of motion  Skin:     General: Skin is warm and dry  Neurological:      Mental Status: She is alert and oriented to person, place, and time     Psychiatric:         Behavior: Behavior normal          Rosario Brooks MD  07/15/22  6:12 PM

## 2022-07-18 DIAGNOSIS — I10 ESSENTIAL HYPERTENSION: ICD-10-CM

## 2022-07-18 DIAGNOSIS — E78.2 MIXED HYPERLIPIDEMIA: ICD-10-CM

## 2022-07-18 DIAGNOSIS — E55.9 VITAMIN D DEFICIENCY: ICD-10-CM

## 2022-07-18 NOTE — TELEPHONE ENCOUNTER
Pt is requesting medication refill for     atorvastatin (LIPITOR) 40 mg tablet     cholecalciferol (VITAMIN D3) 1,000 units tablet     losartan (COZAAR) 100 MG tablet     amLODIPine (NORVASC) 10 mg tablet       Please send to pharmacy on file

## 2022-07-20 DIAGNOSIS — I10 ESSENTIAL HYPERTENSION: ICD-10-CM

## 2022-07-20 RX ORDER — AMLODIPINE BESYLATE 10 MG/1
10 TABLET ORAL DAILY
Qty: 90 TABLET | Refills: 3 | Status: SHIPPED | OUTPATIENT
Start: 2022-07-20

## 2022-07-20 RX ORDER — AMLODIPINE BESYLATE 10 MG/1
TABLET ORAL
Qty: 90 TABLET | Refills: 0 | Status: SHIPPED | OUTPATIENT
Start: 2022-07-20

## 2022-07-20 RX ORDER — LOSARTAN POTASSIUM 100 MG/1
100 TABLET ORAL DAILY
Qty: 90 TABLET | Refills: 3 | Status: SHIPPED | OUTPATIENT
Start: 2022-07-20

## 2022-07-20 RX ORDER — MELATONIN
2000 DAILY
Qty: 60 TABLET | Refills: 3 | Status: SHIPPED | OUTPATIENT
Start: 2022-07-20 | End: 2022-10-20 | Stop reason: SDUPTHER

## 2022-07-20 RX ORDER — ATORVASTATIN CALCIUM 40 MG/1
40 TABLET, FILM COATED ORAL DAILY
Qty: 30 TABLET | Refills: 0 | Status: SHIPPED | OUTPATIENT
Start: 2022-07-20 | End: 2022-10-20 | Stop reason: SDUPTHER

## 2022-08-24 ENCOUNTER — CONSULT (OUTPATIENT)
Dept: GASTROENTEROLOGY | Facility: MEDICAL CENTER | Age: 72
End: 2022-08-24
Payer: MEDICARE

## 2022-08-24 ENCOUNTER — TELEPHONE (OUTPATIENT)
Dept: GASTROENTEROLOGY | Facility: MEDICAL CENTER | Age: 72
End: 2022-08-24

## 2022-08-24 VITALS
TEMPERATURE: 97.6 F | HEART RATE: 80 BPM | DIASTOLIC BLOOD PRESSURE: 70 MMHG | SYSTOLIC BLOOD PRESSURE: 129 MMHG | WEIGHT: 176.3 LBS | BODY MASS INDEX: 32.25 KG/M2

## 2022-08-24 DIAGNOSIS — Z12.11 COLON CANCER SCREENING: ICD-10-CM

## 2022-08-24 PROCEDURE — 99243 OFF/OP CNSLTJ NEW/EST LOW 30: CPT | Performed by: PHYSICIAN ASSISTANT

## 2022-08-24 NOTE — PROGRESS NOTES
[facility] - Outpatient Consultation  Shraddha Bright  70 y o  female MRN: [id number]  Encounter: [id number]          ASSESSMENT AND PLAN:      1  Colon cancer screening: last colonoscopy was 10 years ago which she states was normal  No family history of colorectal cancer  No gi complaints or alarm symptoms   - Ambulatory Referral to Gastroenterology  - Colonoscopy; Future  -miralax/mag citrate    Risks of colonoscopy were discussed including but not limited to bleeding, infection, perforation, missed lesions  She understands and agrees to proceed with procedure        ______________________________________________________________________    HPI:  Shraddha wilkinson with past medical history of mild LVH here as a new patient for colon cancer screening purposes  She is [place] speaking and [employer]  was used #[id number]  She states that her last colonoscopy was 10 or more years ago, which she states was normal   She denies any family history of colorectal cancers  She denies any GI complaints today or any alarm symptoms including change in bowel habits, weight loss, melena or hematochezia  REVIEW OF SYSTEMS:    CONSTITUTIONAL: Denies any fever, chills, rigors, and weight loss  HEENT: No earache or tinnitus  Denies hearing loss or visual disturbances  CARDIOVASCULAR: No chest pain or palpitations  RESPIRATORY: Denies any cough, hemoptysis, shortness of breath or dyspnea on exertion  GASTROINTESTINAL: As noted in the History of Present Illness  GENITOURINARY: No problems with urination  Denies any hematuria or dysuria  NEUROLOGIC: No dizziness or vertigo, denies headaches  MUSCULOSKELETAL: Denies any muscle or joint pain  SKIN: Denies skin rashes or itching  ENDOCRINE: Denies excessive thirst  Denies intolerance to heat or cold  PSYCHOSOCIAL: Denies depression or anxiety  Denies any recent memory loss         Historical Information   Past Medical History: Diagnosis Date    Echocardiogram abnormal 02/01/2016    Mild LVH with normal systolic function EF > 79%  Grade I Diastolic dysfunction  Aortic sclerosis w/o significant stenosis  Mild insufficiency  Mild MR  Mild TR with normal pulmonary artery pressure   History of mammogram 09/13/2017    Benign    History of mammogram 02/15/2016    Benign    Osteopenia determined by x-ray 02/03/2016    osteopenia of the left femoral neck and normal bone marrow density of the left hip total  Osteopenia of the lumbar spine        Past Surgical History:   Procedure Laterality Date    HYSTERECTOMY      age 39     Social History   Social History     Substance and Sexual Activity   Alcohol Use Yes    Alcohol/week: 1 0 standard drink    Types: 1 Glasses of wine per week     Social History     Substance and Sexual Activity   Drug Use Never     Social History     Tobacco Use   Smoking Status Never Smoker   Smokeless Tobacco Never Used     Family History   Problem Relation Age of Onset    No Known Problems Mother     No Known Problems Father     No Known Problems Sister     No Known Problems Daughter     No Known Problems Maternal Grandmother     No Known Problems Maternal Grandfather     No Known Problems Paternal Grandmother     No Known Problems Paternal Grandfather     No Known Problems Maternal Aunt     No Known Problems Paternal Aunt     No Known Problems Paternal Aunt     No Known Problems Paternal Aunt     No Known Problems Paternal Aunt     No Known Problems Paternal Aunt        Meds/Allergies       Current Outpatient Medications:     acetaminophen (TYLENOL) 500 mg tablet    alendronate (Fosamax) 70 mg tablet    amLODIPine (NORVASC) 10 mg tablet    amLODIPine (NORVASC) 10 mg tablet    atorvastatin (LIPITOR) 40 mg tablet    Calcium Carbonate-Vitamin D 600-400 MG-UNIT per tablet    cholecalciferol (VITAMIN D3) 1,000 units tablet    Diclofenac Sodium (VOLTAREN) 1 %    fluticasone (FLONASE) 50 mcg/act nasal spray    loratadine (CLARITIN) 10 mg tablet    losartan (COZAAR) 100 MG tablet    benzonatate (TESSALON) 200 MG capsule    Blood Pressure KIT    Allergies   Allergen Reactions    Aspirin     Penicillins Other (See Comments) and Rash           Objective     Blood pressure 129/70, pulse 80, temperature 97 6 °F (36 4 °C), weight 80 kg (176 lb 4 8 oz), not currently breastfeeding  Body mass index is 32 25 kg/m²  PHYSICAL EXAM:      General Appearance:   Alert, cooperative, no distress   HEENT:   Normocephalic, atraumatic, anicteric      Neck:  Supple, symmetrical, trachea midline   Lungs:   Clear to auscultation bilaterally; no rales, rhonchi or wheezing; respirations unlabored    Heart[de-identified]   Regular rate and rhythm; no murmur, rub, or gallop  Abdomen:   Soft, non-tender, non-distended; normal bowel sounds; no masses, no organomegaly    Genitalia:   Deferred    Rectal:   Deferred    Extremities:  No cyanosis, clubbing or edema        Skin:  No jaundice, rashes, or lesions          Lab Results:   No visits with results within 1 Day(s) from this visit  Latest known visit with results is:   Appointment on 09/21/2021   Component Date Value    Vit D, 25-Hydroxy 09/21/2021 16 4 (A)    Calcium 09/21/2021 9 9          Radiology Results:   No results found

## 2022-08-24 NOTE — TELEPHONE ENCOUNTER
colonoscopy  Scheduled date of 9/21/22 (as of today) 8/24/22  Physician performing Dr Nga Castellanos:  Location of procedure  Fi End:  Bowel prep reviewed with patient: miralax/mag citrate  Instructions reviewed with patient by: MA  Clearances:

## 2022-09-19 RX ORDER — SODIUM CHLORIDE 9 MG/ML
125 INJECTION, SOLUTION INTRAVENOUS CONTINUOUS
Status: CANCELLED | OUTPATIENT
Start: 2022-09-19

## 2022-09-21 ENCOUNTER — ANESTHESIA (OUTPATIENT)
Dept: GASTROENTEROLOGY | Facility: MEDICAL CENTER | Age: 72
End: 2022-09-21

## 2022-09-21 ENCOUNTER — HOSPITAL ENCOUNTER (OUTPATIENT)
Dept: GASTROENTEROLOGY | Facility: MEDICAL CENTER | Age: 72
Setting detail: OUTPATIENT SURGERY
Discharge: HOME | End: 2022-09-21
Payer: MEDICARE

## 2022-09-21 ENCOUNTER — ANESTHESIA EVENT (OUTPATIENT)
Dept: GASTROENTEROLOGY | Facility: MEDICAL CENTER | Age: 72
End: 2022-09-21

## 2022-09-21 VITALS
OXYGEN SATURATION: 94 % | SYSTOLIC BLOOD PRESSURE: 139 MMHG | HEIGHT: 64 IN | TEMPERATURE: 96.8 F | WEIGHT: 176 LBS | RESPIRATION RATE: 16 BRPM | BODY MASS INDEX: 30.05 KG/M2 | DIASTOLIC BLOOD PRESSURE: 75 MMHG | HEART RATE: 72 BPM

## 2022-09-21 DIAGNOSIS — Z12.11 COLON CANCER SCREENING: ICD-10-CM

## 2022-09-21 PROCEDURE — 45385 COLONOSCOPY W/LESION REMOVAL: CPT | Performed by: INTERNAL MEDICINE

## 2022-09-21 PROCEDURE — 88305 TISSUE EXAM BY PATHOLOGIST: CPT | Performed by: SPECIALIST

## 2022-09-21 RX ORDER — SODIUM CHLORIDE 9 MG/ML
125 INJECTION, SOLUTION INTRAVENOUS CONTINUOUS
Status: DISCONTINUED | OUTPATIENT
Start: 2022-09-21 | End: 2022-09-25 | Stop reason: HOSPADM

## 2022-09-21 RX ORDER — LIDOCAINE HYDROCHLORIDE 20 MG/ML
INJECTION, SOLUTION EPIDURAL; INFILTRATION; INTRACAUDAL; PERINEURAL AS NEEDED
Status: DISCONTINUED | OUTPATIENT
Start: 2022-09-21 | End: 2022-09-21

## 2022-09-21 RX ORDER — PROPOFOL 10 MG/ML
INJECTION, EMULSION INTRAVENOUS AS NEEDED
Status: DISCONTINUED | OUTPATIENT
Start: 2022-09-21 | End: 2022-09-21

## 2022-09-21 RX ADMIN — PROPOFOL 50 MG: 10 INJECTION, EMULSION INTRAVENOUS at 08:25

## 2022-09-21 RX ADMIN — SODIUM CHLORIDE 125 ML/HR: 0.9 INJECTION, SOLUTION INTRAVENOUS at 07:59

## 2022-09-21 RX ADMIN — PROPOFOL 50 MG: 10 INJECTION, EMULSION INTRAVENOUS at 08:32

## 2022-09-21 RX ADMIN — PROPOFOL 100 MG: 10 INJECTION, EMULSION INTRAVENOUS at 08:23

## 2022-09-21 RX ADMIN — PROPOFOL 50 MG: 10 INJECTION, EMULSION INTRAVENOUS at 08:27

## 2022-09-21 RX ADMIN — LIDOCAINE HYDROCHLORIDE 60 MG: 20 INJECTION, SOLUTION EPIDURAL; INFILTRATION; INTRACAUDAL; PERINEURAL at 08:23

## 2022-09-21 RX ADMIN — PROPOFOL 50 MG: 10 INJECTION, EMULSION INTRAVENOUS at 08:39

## 2022-09-21 NOTE — ANESTHESIA PREPROCEDURE EVALUATION
Procedure:  COLONOSCOPY    Relevant Problems   ANESTHESIA (within normal limits)      CARDIO   (+) Chest pain, pleuritic   (+) Essential hypertension   (+) Hyperlipidemia      MUSCULOSKELETAL   (+) Primary osteoarthritis of right knee        Physical Exam    Airway    Mallampati score: III  TM Distance: >3 FB  Neck ROM: full     Dental       Cardiovascular  Rhythm: regular, Rate: normal,     Pulmonary  Breath sounds clear to auscultation,     Other Findings        Anesthesia Plan  ASA Score- 2     Anesthesia Type- IV sedation with anesthesia with ASA Monitors  Additional Monitors:   Airway Plan:           Plan Factors-Exercise tolerance (METS): >4 METS  Chart reviewed  Patient summary reviewed  Patient is not a current smoker  Induction- intravenous  Postoperative Plan-     Informed Consent- Anesthetic plan and risks discussed with patient

## 2022-09-21 NOTE — H&P
History and Physical - [facility]  Shraddha Aguilera 70 y o  female MRN: [id number]                  HPI: Shraddha Aguilera is a 70y o  year old female who presents for screening evaluation    REVIEW OF SYSTEMS: Per the HPI, and otherwise unremarkable  Historical Information   Past Medical History:   Diagnosis Date    Echocardiogram abnormal 02/01/2016    Mild LVH with normal systolic function EF > 74%  Grade I Diastolic dysfunction  Aortic sclerosis w/o significant stenosis  Mild insufficiency  Mild MR  Mild TR with normal pulmonary artery pressure   History of mammogram 09/13/2017    Benign    History of mammogram 02/15/2016    Benign    HTN (hypertension)     Osteopenia determined by x-ray 02/03/2016    osteopenia of the left femoral neck and normal bone marrow density of the left hip total  Osteopenia of the lumbar spine        Past Surgical History:   Procedure Laterality Date    BLADDER SUSPENSION      HYSTERECTOMY      age 39     Social History   Social History     Substance and Sexual Activity   Alcohol Use Yes    Alcohol/week: 1 0 standard drink    Types: 1 Glasses of wine per week     Social History     Substance and Sexual Activity   Drug Use Never     Social History     Tobacco Use   Smoking Status Never Smoker   Smokeless Tobacco Never Used     Family History   Problem Relation Age of Onset    No Known Problems Mother     No Known Problems Father     No Known Problems Sister     No Known Problems Daughter     No Known Problems Maternal Grandmother     No Known Problems Maternal Grandfather     No Known Problems Paternal Grandmother     No Known Problems Paternal Grandfather     No Known Problems Maternal Aunt     No Known Problems Paternal Aunt     No Known Problems Paternal Aunt     No Known Problems Paternal Aunt     No Known Problems Paternal Aunt     No Known Problems Paternal Aunt        Meds/Allergies       Current Outpatient Medications:   alendronate (Fosamax) 70 mg tablet    amLODIPine (NORVASC) 10 mg tablet    atorvastatin (LIPITOR) 40 mg tablet    Calcium Carbonate-Vitamin D 600-400 MG-UNIT per tablet    cholecalciferol (VITAMIN D3) 1,000 units tablet    losartan (COZAAR) 100 MG tablet    acetaminophen (TYLENOL) 500 mg tablet    amLODIPine (NORVASC) 10 mg tablet    benzonatate (TESSALON) 200 MG capsule    Blood Pressure KIT    Diclofenac Sodium (VOLTAREN) 1 %    fluticasone (FLONASE) 50 mcg/act nasal spray    loratadine (CLARITIN) 10 mg tablet    Current Facility-Administered Medications:     sodium chloride 0 9 % infusion, 125 mL/hr, Intravenous, Continuous, 125 mL/hr at 09/21/22 7044    Allergies   Allergen Reactions    Aspirin     Penicillins Other (See Comments) and Rash       Objective     /72   Pulse 68   Temp (!) 96 8 °F (36 °C) (Temporal)   Resp 20   SpO2 97%       PHYSICAL EXAM    Gen: NAD  Head: NCAT  CV: RRR  CHEST: Clear  ABD: soft, NT/ND  EXT: no edema      ASSESSMENT/PLAN:  This is a 70y o  year old female here for colonoscopy, and she is stable and optimized for her procedure

## 2022-09-21 NOTE — ANESTHESIA POSTPROCEDURE EVALUATION
Post-Op Assessment Note    CV Status:  Stable    Pain management: adequate     Mental Status:  Alert and awake   Hydration Status:  Euvolemic   PONV Controlled:  Controlled   Airway Patency:  Patent      Post Op Vitals Reviewed: Yes      Staff: Anesthesiologist         No complications documented      BP      Temp     Pulse     Resp      SpO2      /75   Pulse 72   Temp (!) 96 8 °F (36 °C) (Temporal)   Resp 16   Ht 5' 4" (1 626 m)   Wt 79 8 kg (176 lb)   SpO2 94%   BMI 30 21 kg/m²

## 2022-09-28 ENCOUNTER — TELEPHONE (OUTPATIENT)
Dept: GASTROENTEROLOGY | Facility: CLINIC | Age: 72
End: 2022-09-28

## 2022-09-28 PROCEDURE — 88305 TISSUE EXAM BY PATHOLOGIST: CPT | Performed by: SPECIALIST

## 2022-09-28 NOTE — TELEPHONE ENCOUNTER
Left message in Moldovan with full details and if they have any questions to feel free to call me back  Recall order has been entered

## 2022-09-28 NOTE — TELEPHONE ENCOUNTER
----- Message from Thomas Srinivasan MD sent at 9/28/2022 10:49 AM EDT -----  Removed polyps are hyperplastic repeat colonoscopy in 10 years

## 2022-10-14 ENCOUNTER — TELEPHONE (OUTPATIENT)
Dept: FAMILY MEDICINE CLINIC | Facility: CLINIC | Age: 72
End: 2022-10-14

## 2022-10-20 ENCOUNTER — OFFICE VISIT (OUTPATIENT)
Dept: FAMILY MEDICINE CLINIC | Facility: CLINIC | Age: 72
End: 2022-10-20

## 2022-10-20 VITALS
BODY MASS INDEX: 29.53 KG/M2 | WEIGHT: 173 LBS | HEART RATE: 76 BPM | SYSTOLIC BLOOD PRESSURE: 130 MMHG | OXYGEN SATURATION: 95 % | TEMPERATURE: 97 F | DIASTOLIC BLOOD PRESSURE: 70 MMHG | HEIGHT: 64 IN | RESPIRATION RATE: 16 BRPM

## 2022-10-20 DIAGNOSIS — M17.11 PRIMARY OSTEOARTHRITIS OF RIGHT KNEE: ICD-10-CM

## 2022-10-20 DIAGNOSIS — E55.9 VITAMIN D DEFICIENCY: ICD-10-CM

## 2022-10-20 DIAGNOSIS — Z23 ENCOUNTER FOR IMMUNIZATION: ICD-10-CM

## 2022-10-20 DIAGNOSIS — Z00.00 ENCOUNTER FOR ANNUAL PHYSICAL EXAM: Primary | ICD-10-CM

## 2022-10-20 DIAGNOSIS — I10 ESSENTIAL HYPERTENSION: ICD-10-CM

## 2022-10-20 DIAGNOSIS — E78.2 MIXED HYPERLIPIDEMIA: ICD-10-CM

## 2022-10-20 RX ORDER — ATORVASTATIN CALCIUM 40 MG/1
40 TABLET, FILM COATED ORAL DAILY
Qty: 90 TABLET | Refills: 1 | Status: SHIPPED | OUTPATIENT
Start: 2022-10-20

## 2022-10-20 RX ORDER — MELATONIN
2000 DAILY
Qty: 180 TABLET | Refills: 3 | Status: SHIPPED | OUTPATIENT
Start: 2022-10-20

## 2022-10-20 NOTE — PROGRESS NOTES
Assessment/Plan:    Encounter for annual physical exam  -CRC screening: No personal or family history of colon cancer or colon polyps  Colonoscopy 8/24/22 wnl  Not due for repeat screening until 2032  Future screening will be a decision based on shared decision making     -BrCa screening: There is no personal or family history of breast cancer  She denies finding new breast lumps, breast pain or nipple discharge  Last mammogram 6/18/22 wnl  Repeat screen in one year  -CVS screening: Patient denies any exertional chest pain, dyspnea, palpitations, syncope, orthopnea, edema or paroxysmal nocturnal dyspnea  -DM screening: No polyuria, polydipsia, blurry vision, chest pain, dyspnea or claudication  No foot burning, numbness or pain  - Immunizations: Pt to receive Flu vaccine today  In addition to the above, the patient was counseled on general preventative health care subjects, including but not limited to:  - Nutrition, healthy weight, aerobic and weight-bearing exercise  - Mental health, social support, and self care  - Advised of the importance of dental hygiene and routine dental visits  Primary osteoarthritis of right knee  Chronic  - Ambulatory referral to PT, home PT exercises included in AVS  - Continue Voltaren 1%, applied to affected area     Essential hypertension  Controlled  Reviewed BP goals with patient  Goal BP <140/90 as per JNC 8 guidelines  Continue to maintain healthy balanced diet with focus on low salt intake  - continue with Norvasc 10 mg, daily  - c/w losartan 100 mg, daily  - Will order CMP and microalbumin/cr    Hyperlipidemia  Last lipid panel 7/20/21   Will reorder lipid panel at this time  Continue atorvastatin 40 mg QD      Return in about 2 weeks (around 11/3/2022) for right knee injection   Diagnoses and all orders for this visit:    Encounter for annual physical exam    Essential hypertension  -     Comprehensive metabolic panel;  Future  -     Lipid panel; Future  -     Microalbumin / creatinine urine ratio    Primary osteoarthritis of right knee  -     Ambulatory Referral to Physical Therapy; Future    Mixed hyperlipidemia  -     atorvastatin (LIPITOR) 40 mg tablet; Take 1 tablet (40 mg total) by mouth daily    Vitamin D deficiency  -     cholecalciferol (VITAMIN D3) 1,000 units tablet; Take 2 tablets (2,000 Units total) by mouth daily    Encounter for immunization  -     influenza vaccine, high-dose, PF 0 7 mL (FLUZONE HIGH-DOSE)          Subjective:     Shraddha Gonzalez is a 67 y o  female who  has a past medical history of Echocardiogram abnormal, History of mammogram, History of mammogram, HTN (hypertension), and Osteopenia determined by x-ray  who presented to the office today for annual physical     HPI    This is a very pleasant 67 y o  female who presents to the clinic for management of their chronic medical conditions  Patient's medical conditions are stable unless noted otherwise above  Patient has not had any recent hospitalizations, or medical emergencies since last visit  Patient has no further complaints other than what is mentioned in the ROS  Review of Systems   Constitutional: Negative for chills and fever  HENT: Negative for congestion, ear pain, rhinorrhea and sinus pain  Eyes: Negative for visual disturbance  Respiratory: Negative for chest tightness, shortness of breath and wheezing  Cardiovascular: Negative for chest pain and palpitations  Gastrointestinal: Negative for abdominal pain, constipation, diarrhea and vomiting  Endocrine: Negative for polyuria  Genitourinary: Negative for dysuria  Musculoskeletal: Negative for myalgias  Neurological: Negative for dizziness, syncope and light-headedness  Psychiatric/Behavioral: Negative for hallucinations         Objective:    /70 (BP Location: Left arm, Patient Position: Sitting, Cuff Size: Large)   Pulse 76   Temp (!) 97 °F (36 1 °C) (Temporal)   Resp 16   Ht 5' 4" (1 626 m)   Wt 78 5 kg (173 lb)   SpO2 95%   Breastfeeding No   BMI 29 70 kg/m²     Physical Exam  Constitutional:       Appearance: Normal appearance  HENT:      Head: Normocephalic and atraumatic  Right Ear: External ear normal       Left Ear: External ear normal       Nose: Nose normal       Mouth/Throat:      Pharynx: Oropharynx is clear  Eyes:      Conjunctiva/sclera: Conjunctivae normal    Cardiovascular:      Rate and Rhythm: Normal rate and regular rhythm  Heart sounds: Normal heart sounds  Pulmonary:      Effort: Pulmonary effort is normal    Musculoskeletal:         General: Normal range of motion  Cervical back: Normal range of motion  Right lower leg: No edema  Left lower leg: No edema  Skin:     General: Skin is warm and dry  Neurological:      Mental Status: She is alert and oriented to person, place, and time     Psychiatric:         Behavior: Behavior normal          Rosalio Meyer  10/26/22  4:17 PM

## 2022-10-20 NOTE — PATIENT INSTRUCTIONS
Ejercicios para la rodilla   CUIDADO AMBULATORIO:   Lo que necesita saber acerca de los ejercicios para la rodilla: Los ejercicios para la rodilla ayudan a fortalecer los músculos alrededor de martinez rodilla  Unos músculos felicity pueden ayudar a reducir el dolor y disminuir el riesgo de sufrir lilibeth lesión en el futuro  Los ejercicios para la rodilla también pueden ayudarlo a recuperarse después de [facility] Buddy Krabbe  Empiece despacio  Estos son Verneice Mark Anthony básicos  Pregúntele a martinez médico si usted necesita acudir con un fisioterapeuta para que le indique ejercicios más avanzado  A medida que se sienta más prince, es posible que pueda realizar más series de cada ejercicio o añadir pesas  Deténgase si siente dolor  Es normal que sienta cierta molestia al principio  Practicar los ejercicios con regularidad ayudará a disminuir martinez incomodidad con el paso del Debbi  Realice los [address]  Maegan esto para [address] se mantengan felicity  Entre en calor antes de hacer los Verneice Mark Anthony para la rodilla  Use lilibeth bicicleta estacionaria o camine trinidad 5 a 10 minutos para que parviz músculos entren en calor  Cómo realizar estiramientos de la rodilla de forma brown: Siempre ejecute los ejercicios de calentamiento antes de hacer cualquier ejercicio de acondicionamiento  Maegan estos ejercicios de estiramiento lilibeth vez más después de hacer los ejercicios de fortalecimiento  Realice estos ejercicios de estiramiento entre 4 o 5 días a la semana o según se lo indicaron  De pie, calentamiento para la pantorrilla: De frente a lilibeth pared, coloque ambas sharron abiertas contra la pared, o agárrese del espaldar de lilibeth silla para mantener el equilibrio  Mantenga las rodillas ligeramente dobladas  Dé un gran paso para atrás con lilibeth pierna  Deje martinez otra pierna directamente debajo de chacha Will y presione con parviz caderas hacia adelante   Sostenga el estiramiento por 30 segundos  Cambie de pierna  Repita ramsey ejercicio 2 o 3 veces con cada pierna  Estiramiento de los cuádriceps de pie: [employer], coloque lilibeth mano contra lilibeth pared, o agárrese del espaldar de lilibeth silla para mantener el equilibrio  Cargue el peso de martinez cuerpo en lilibeth pierna, flexione la rodilla de la otra pierna y tómese del tobillo  Acerque el tobillo a parviz nalgas  Sostenga el estiramiento de 30 a 60 segundos  Cambie de pierna  Repita ramsey ejercicio 2 o 3 veces con cada pierna  Sentado, estiramiento del tendón de la corva, parte posterior del muslo: Siéntese en lilibeth superficie plana en el piso con las dos piernas enfrente de usted  No flexione parviz dedos de los pies ni los ponga en puntas  Coloque las wang de parviz sharron en el piso y deslice parviz sharron hacia adelante hasta que usted sienta lilibeth resistencia o un estiramiento leve  Debe mantener la espalda derecha  Sostenga el estiramiento por 30 segundos  Repita 2 o 3 veces  Cómo realizar ejercicios de fortalecimiento de la rodilla de manera brown: Realice estos ejercicios 4 o 5 días a la semana o según le indicaron  Medias sentadillas de pie: Párese con los pies a la distancia de parviz hombros  Lleve martinez espalda contra lilibeth pared o agárrese del espaldar de lilibeth silla para mantener el equilibrio, si es necesario  [address] y baje unas 10 pulgadas lentamente, tiffani si fuera a sentarse en lilibeth silla  [place] de martinez cuerpo debería encontrarse mayormente sobre parviz talones  Sostenga las sentadillas por 5 segundos, luego regresa a la posición inicial  Realice 3 series de 10 sentadillas para fortalecer los glúteos y los muslos  De pie flexión de los músculos isquiotibiales: De frente a lilibeth pared, coloque ambas sharron abiertas contra la pared, o agárrese del espaldar de lilibeth silla para mantener el equilibrio  Cargue el peso de martinez cuerpo en lilibeth pierna, levante otra pierna y lleve martinez talón tan cerca de los glúteos tiffani pueda   Sostenga por 5 segundos y baje martinez pierna  Realice 2 series de 10 flexiones con cada pierna  Mandy ejercicio fortalece el músculo de la parte posterior de martinez muslo  De pie levantamiento de las pantorillas o gémelos: De frente a lilibeth pared, coloque ambas sharron abiertas contra la pared, o agárrese del espaldar de lilibeth silla para mantener el equilibrio  Póngase de pie derecho, y no se alhaji hacia adelante  Coloque todo martinez peso sobre lilibeth derick pierna levantando el otro pie del suelo  Levante el talón del pie que está en el piso tan alto tiffani pueda y Krositabemeredithn K  Realice 2 series de 10 levantamientos de pantorilla con cada pierna para fortalecer los músculos de la pantorilla  Enderezar la pierna y levantarla: Acuéstese boca abajo con las piernas estiradas  Cruce parviz brazos enfrente de usted y descanse la frente sobre parviz brazos cruzados  Apriete el músculo de martinez pierna y levántela tanto tiffani pueda  Sostenga por 5 segundos, y luego baje martinez pierna  Realice 2 series de 10 levantamientos con cada pierna para fortalecer parviz glúteos  Sentado, levantamiento de pierna: Siéntese en lilibeth silla  Despacio enderece y levante lilibeth pierna  Contraiga el músculo de martinez muslo y sostenga por 5 segundos  Relaje y regrese martinez pie al piso  Realice 2 series de 10 levantamientos con cada pierna  Yulee le ayuda a fortalecer el músculo anterior de martinez muslo  Comuníquese con martinez médico si:  Usted tiene un nuevo dolor o el dolor SHERRY  Usted tiene preguntas o inquietudes acerca de martinez condición o cuidado  © [facility] 2022 Information is for End User's use only and may not be sold, redistributed or otherwise used for commercial purposes  All illustrations and images included in CareNotes® are the copyrighted property of A Olu DUPREE  or [address] [place] [address] es sólo para uso en educación  Martinez intención no es darle un consejo médico sobre enfermedades o tratamientos   Colsulte con martinez médico, enfermera o farmacéutico antes de seguir cualquier régimen médico para saber si es seguro y efectivo para usted

## 2022-10-26 ENCOUNTER — LAB (OUTPATIENT)
Dept: LAB | Facility: HOSPITAL | Age: 72
End: 2022-10-26

## 2022-10-26 DIAGNOSIS — R73.9 HYPERGLYCEMIA: Primary | ICD-10-CM

## 2022-10-26 DIAGNOSIS — I10 ESSENTIAL HYPERTENSION: ICD-10-CM

## 2022-10-26 LAB
ALBUMIN SERPL BCP-MCNC: 4.2 G/DL (ref 3.5–5)
ALP SERPL-CCNC: 57 U/L (ref 43–122)
ALT SERPL W P-5'-P-CCNC: 20 U/L
ANION GAP SERPL CALCULATED.3IONS-SCNC: 7 MMOL/L (ref 5–14)
AST SERPL W P-5'-P-CCNC: 24 U/L (ref 14–36)
BILIRUB SERPL-MCNC: 0.79 MG/DL (ref 0.2–1)
BUN SERPL-MCNC: 11 MG/DL (ref 5–25)
CALCIUM SERPL-MCNC: 9.1 MG/DL (ref 8.4–10.2)
CHLORIDE SERPL-SCNC: 103 MMOL/L (ref 96–108)
CHOLEST SERPL-MCNC: 181 MG/DL
CO2 SERPL-SCNC: 30 MMOL/L (ref 21–32)
CREAT SERPL-MCNC: 0.61 MG/DL (ref 0.6–1.2)
CREAT UR-MCNC: 37.4 MG/DL
GFR SERPL CREATININE-BSD FRML MDRD: 90 ML/MIN/1.73SQ M
GLUCOSE P FAST SERPL-MCNC: 113 MG/DL (ref 70–99)
HDLC SERPL-MCNC: 44 MG/DL
LDLC SERPL CALC-MCNC: 111 MG/DL
MICROALBUMIN UR-MCNC: 8 MG/L (ref 0–20)
MICROALBUMIN/CREAT 24H UR: 21 MG/G CREATININE (ref 0–30)
NONHDLC SERPL-MCNC: 137 MG/DL
POTASSIUM SERPL-SCNC: 3.6 MMOL/L (ref 3.5–5.3)
PROT SERPL-MCNC: 8.7 G/DL (ref 6.4–8.4)
SODIUM SERPL-SCNC: 140 MMOL/L (ref 135–147)
TRIGL SERPL-MCNC: 132 MG/DL

## 2022-10-26 PROCEDURE — 82043 UR ALBUMIN QUANTITATIVE: CPT | Performed by: STUDENT IN AN ORGANIZED HEALTH CARE EDUCATION/TRAINING PROGRAM

## 2022-10-26 PROCEDURE — 82570 ASSAY OF URINE CREATININE: CPT | Performed by: STUDENT IN AN ORGANIZED HEALTH CARE EDUCATION/TRAINING PROGRAM

## 2022-10-26 NOTE — ASSESSMENT & PLAN NOTE
-CRC screening: No personal or family history of colon cancer or colon polyps  Colonoscopy 8/24/22 wnl  Not due for repeat screening until 2032  Future screening will be a decision based on shared decision making     -BrCa screening: There is no personal or family history of breast cancer  She denies finding new breast lumps, breast pain or nipple discharge  Last mammogram 6/18/22 wnl  Repeat screen in one year  -CVS screening: Patient denies any exertional chest pain, dyspnea, palpitations, syncope, orthopnea, edema or paroxysmal nocturnal dyspnea  -DM screening: No polyuria, polydipsia, blurry vision, chest pain, dyspnea or claudication  No foot burning, numbness or pain  - Immunizations: Pt to receive Flu vaccine today  In addition to the above, the patient was counseled on general preventative health care subjects, including but not limited to:  - Nutrition, healthy weight, aerobic and weight-bearing exercise  - Mental health, social support, and self care  - Advised of the importance of dental hygiene and routine dental visits

## 2022-10-26 NOTE — ASSESSMENT & PLAN NOTE
Controlled  Reviewed BP goals with patient  Goal BP <140/90 as per JNC 8 guidelines  Continue to maintain healthy balanced diet with focus on low salt intake       - continue with Norvasc 10 mg, daily  - c/w losartan 100 mg, daily  - Will order CMP and microalbumin/cr

## 2022-10-26 NOTE — ASSESSMENT & PLAN NOTE
Chronic  - Ambulatory referral to PT, home PT exercises included in AVS  - Continue Voltaren 1%, applied to affected area

## 2022-10-26 NOTE — RESULT ENCOUNTER NOTE
Please call patient and let her know her labs showed slightly elevated blood sugar and I would like to screen her for diabetes with an A1C  Please let her know I radha ordered an A1c and she can have it completed at the lab when she has a chance  Please also let her know her cholesterol levels look well controlled and she should continue her cholesterol medication       Ty

## 2022-11-03 ENCOUNTER — PROCEDURE VISIT (OUTPATIENT)
Dept: FAMILY MEDICINE CLINIC | Facility: CLINIC | Age: 72
End: 2022-11-03

## 2022-11-03 VITALS
HEART RATE: 89 BPM | DIASTOLIC BLOOD PRESSURE: 80 MMHG | RESPIRATION RATE: 18 BRPM | HEIGHT: 64 IN | TEMPERATURE: 96.9 F | BODY MASS INDEX: 30.22 KG/M2 | WEIGHT: 177 LBS | OXYGEN SATURATION: 96 % | SYSTOLIC BLOOD PRESSURE: 140 MMHG

## 2022-11-03 DIAGNOSIS — M17.11 PRIMARY OSTEOARTHRITIS OF RIGHT KNEE: Primary | ICD-10-CM

## 2022-11-03 NOTE — PROGRESS NOTES
Name: Oh Tse      : 1950      MRN: [id number]  Encounter Provider: Brendan Friedman MD  Encounter Date: 11/3/2022   Encounter department: [address] [place] [address]     1  Primary osteoarthritis of right knee  Assessment & Plan:  Chronic  Xrays showing degenerative changes, reviewed  A steroid injection was performed at anterolateral joint using 1% plain Lidocaine and 80 mg of Kenalog  This was well tolerated  Patient was informed of expected pain relief within 4 to 6 hours secondary to anesthetic  They were advised that they may experience a temporary increase in pain within the next 6 to 24 hours as the anesthetic wears off  Patient was advised to apply ice and take Tylenol 1000 mg Q6h for pain  Patient was informed that corticosteroid injection will likely take effect within 24 to 48 hours at which point the pain should significantly improve  Patient informed that it can take up to 7-10 days to feel full extent of improvement  Patient was advised that if their knee becomes hot, red, or swollen that they should return to the clinic as soon as possible or go to local ED for evaluation  Subjective     This is a very pleasant 67 y o  female who presents to the clinic for management of their chronic medical conditions  Patient's medical conditions are stable unless noted otherwise above  Patient has not had any recent hospitalizations, or medical emergencies since last visit  Patient has no further complaints other than what is mentioned in the ROS  Review of Systems   Constitutional: Negative for chills and fever  HENT: Negative for congestion, ear pain, rhinorrhea and sinus pain  Eyes: Negative for visual disturbance  Respiratory: Negative for chest tightness, shortness of breath and wheezing  Cardiovascular: Negative for chest pain and palpitations     Gastrointestinal: Negative for abdominal pain, constipation, diarrhea and vomiting  Endocrine: Negative for polyuria  Genitourinary: Negative for dysuria  Musculoskeletal: Negative for arthralgias and myalgias  Neurological: Negative for dizziness, syncope and light-headedness  Psychiatric/Behavioral: Negative for hallucinations, self-injury and suicidal ideas  Past Medical History:   Diagnosis Date   • Echocardiogram abnormal 02/01/2016    Mild LVH with normal systolic function EF > 64%  Grade I Diastolic dysfunction  Aortic sclerosis w/o significant stenosis  Mild insufficiency  Mild MR  Mild TR with normal pulmonary artery pressure  • History of mammogram 09/13/2017    Benign   • History of mammogram 02/15/2016    Benign   • HTN (hypertension)    • Osteopenia determined by x-ray 02/03/2016    osteopenia of the left femoral neck and normal bone marrow density of the left hip total  Osteopenia of the lumbar spine  Past Surgical History:   Procedure Laterality Date   • BLADDER SUSPENSION     • HYSTERECTOMY      age 39     Family History   Problem Relation Age of Onset   • No Known Problems Mother    • No Known Problems Father    • No Known Problems Sister    • No Known Problems Daughter    • No Known Problems Maternal Grandmother    • No Known Problems Maternal Grandfather    • No Known Problems Paternal Grandmother    • No Known Problems Paternal Grandfather    • No Known Problems Maternal Aunt    • No Known Problems Paternal Aunt    • No Known Problems Paternal Aunt    • No Known Problems Paternal Aunt    • No Known Problems Paternal Aunt    • No Known Problems Paternal Aunt      Social History     Socioeconomic History   • Marital status:      Spouse name: None   • Number of children: None   • Years of education: None   • Highest education level: None   Occupational History   • None   Tobacco Use   • Smoking status: Never Smoker   • Smokeless tobacco: Never Used   Substance and Sexual Activity   • Alcohol use:  Yes     Alcohol/week: 1 0 standard drink     Types: 1 Glasses of wine per week   • Drug use: Never   • Sexual activity: None   Other Topics Concern   • None   Social History Narrative   • None     Social Determinants of Health     Financial Resource Strain: Low Risk    • Difficulty of Paying Living Expenses: Not hard at all   Food Insecurity: No Food Insecurity   • Worried About Running Out of Food in the Last Year: Never true   • Ran Out of Food in the Last Year: Never true   Transportation Needs: No Transportation Needs   • Lack of Transportation (Medical): No   • Lack of Transportation (Non-Medical): No   Physical Activity: Not on file   Stress: Not on file   Social Connections: Not on file   Intimate Partner Violence: Not on file   Housing Stability: Not on file     Current Outpatient Medications on File Prior to Visit   Medication Sig   • acetaminophen (TYLENOL) 500 mg tablet Take 1 tablet (500 mg total) by mouth every 6 (six) hours as needed for mild pain   • alendronate (Fosamax) 70 mg tablet Take 1 tablet (70 mg total) by mouth every 7 days   • amLODIPine (NORVASC) 10 mg tablet Take 1 tablet (10 mg total) by mouth daily   • amLODIPine (NORVASC) 10 mg tablet TAKE 1 TABLET BY MOUTH DAILY  • atorvastatin (LIPITOR) 40 mg tablet Take 1 tablet (40 mg total) by mouth daily   • benzonatate (TESSALON) 200 MG capsule Take 1 capsule (200 mg total) by mouth 3 (three) times a day as needed for cough (Patient not taking: Reported on 8/24/2022)   • Blood Pressure KIT Take BP reading daily and as needed   (Patient not taking: Reported on 8/24/2022)   • Calcium Carbonate-Vitamin D 600-400 MG-UNIT per tablet Take 1 tablet by mouth daily   • cholecalciferol (VITAMIN D3) 1,000 units tablet Take 2 tablets (2,000 Units total) by mouth daily   • Diclofenac Sodium (VOLTAREN) 1 % Apply 2 g topically 4 (four) times a day   • fluticasone (FLONASE) 50 mcg/act nasal spray 1 spray into each nostril daily   • loratadine (CLARITIN) 10 mg tablet Take 1 tablet (10 mg total) by mouth daily   • losartan (COZAAR) 100 MG tablet Take 1 tablet (100 mg total) by mouth daily     Allergies   Allergen Reactions   • Aspirin    • Penicillins Other (See Comments) and Rash     Immunization History   Administered Date(s) Administered   • COVID-19 MODERNA VACC 0 5 ML IM 01/31/2021, 02/28/2021   • INFLUENZA 01/25/2016, 12/16/2016, 01/26/2018, 04/13/2022   • Influenza, high dose seasonal 0 7 mL 01/28/2019, 11/09/2020, 04/13/2022, 10/20/2022   • Pneumococcal Conjugate 13-Valent 04/12/2017   • Pneumococcal Polysaccharide PPV23 01/26/2018   • Tdap 04/12/2017       Objective     /80 (BP Location: Left arm, Patient Position: Sitting, Cuff Size: Adult)   Pulse 89   Temp (!) 96 9 °F (36 1 °C) (Temporal)   Resp 18   Ht 5' 4" (1 626 m)   Wt 80 3 kg (177 lb)   SpO2 96%   BMI 30 38 kg/m²     Physical Exam  Constitutional:       Appearance: Normal appearance  HENT:      Head: Normocephalic and atraumatic  Nose: Nose normal    Eyes:      Conjunctiva/sclera: Conjunctivae normal    Cardiovascular:      Rate and Rhythm: Normal rate  Pulmonary:      Effort: Pulmonary effort is normal    Musculoskeletal:         General: Normal range of motion  Cervical back: Normal range of motion  Skin:     General: Skin is warm and dry  Neurological:      Mental Status: She is alert and oriented to person, place, and time  Psychiatric:         Behavior: Behavior normal           Large joint arthrocentesis  Universal Protocol:  Procedure performed by:  Consent: Verbal consent obtained  Risks and benefits: risks, benefits and alternatives were discussed  Consent given by: patient  Time out: Immediately prior to procedure a "time out" was called to verify the correct patient, procedure, equipment, support staff and site/side marked as required    Timeout called at: 11/3/2022 3:40 PM   Patient understanding: patient states understanding of the procedure being performed  Patient consent: the patient's understanding of the procedure matches consent given  Procedure consent: procedure consent matches procedure scheduled  Relevant documents: relevant documents present and verified  Site marked: the operative site was marked  Patient identity confirmed: verbally with patient    Supporting Documentation  Indications: pain   Procedure Details  Preparation: Patient was prepped and draped in the usual sterile fashion  Needle size: 22 G  Ultrasound guidance: no  Approach: anterolateral  Medications administered: 3 mL lidocaine 1 %; 80 mg triamcinolone acetonide 40 mg/mL    Patient tolerance: patient tolerated the procedure well with no immediate complications  Dressing:  Sterile dressing applied          Bari Brittle, MD

## 2022-11-04 RX ORDER — TRIAMCINOLONE ACETONIDE 40 MG/ML
80 INJECTION, SUSPENSION INTRA-ARTICULAR; INTRAMUSCULAR
Status: COMPLETED | OUTPATIENT
Start: 2022-11-04 | End: 2022-11-04

## 2022-11-04 RX ORDER — LIDOCAINE HYDROCHLORIDE 10 MG/ML
3 INJECTION, SOLUTION INFILTRATION; PERINEURAL
Status: COMPLETED | OUTPATIENT
Start: 2022-11-04 | End: 2022-11-04

## 2022-11-04 RX ADMIN — LIDOCAINE HYDROCHLORIDE 3 ML: 10 INJECTION, SOLUTION INFILTRATION; PERINEURAL at 18:13

## 2022-11-04 RX ADMIN — TRIAMCINOLONE ACETONIDE 80 MG: 40 INJECTION, SUSPENSION INTRA-ARTICULAR; INTRAMUSCULAR at 18:13

## 2022-11-04 NOTE — ASSESSMENT & PLAN NOTE
Chronic  Xrays showing degenerative changes, reviewed  A steroid injection was performed at anterolateral joint using 1% plain Lidocaine and 80 mg of Kenalog  This was well tolerated  Patient was informed of expected pain relief within 4 to 6 hours secondary to anesthetic  They were advised that they may experience a temporary increase in pain within the next 6 to 24 hours as the anesthetic wears off  Patient was advised to apply ice and take Tylenol 1000 mg Q6h for pain  Patient was informed that corticosteroid injection will likely take effect within 24 to 48 hours at which point the pain should significantly improve  Patient informed that it can take up to 7-10 days to feel full extent of improvement  Patient was advised that if their knee becomes hot, red, or swollen that they should return to the clinic as soon as possible or go to local ED for evaluation

## 2022-11-08 ENCOUNTER — APPOINTMENT (OUTPATIENT)
Dept: LAB | Facility: HOSPITAL | Age: 72
End: 2022-11-08

## 2022-11-08 DIAGNOSIS — R73.9 HYPERGLYCEMIA: ICD-10-CM

## 2022-11-08 LAB
EST. AVERAGE GLUCOSE BLD GHB EST-MCNC: 126 MG/DL
HBA1C MFR BLD: 6 %

## 2022-11-19 DIAGNOSIS — M81.0 AGE-RELATED OSTEOPOROSIS WITHOUT CURRENT PATHOLOGICAL FRACTURE: ICD-10-CM

## 2022-11-24 RX ORDER — ALENDRONATE SODIUM 70 MG/1
TABLET ORAL
Qty: 12 TABLET | Refills: 0 | Status: SHIPPED | OUTPATIENT
Start: 2022-11-24

## 2023-02-03 ENCOUNTER — OFFICE VISIT (OUTPATIENT)
Dept: FAMILY MEDICINE CLINIC | Facility: CLINIC | Age: 73
End: 2023-02-03

## 2023-02-03 VITALS
OXYGEN SATURATION: 99 % | DIASTOLIC BLOOD PRESSURE: 80 MMHG | SYSTOLIC BLOOD PRESSURE: 158 MMHG | HEART RATE: 80 BPM | TEMPERATURE: 96.9 F | WEIGHT: 172 LBS | BODY MASS INDEX: 29.37 KG/M2 | HEIGHT: 64 IN | RESPIRATION RATE: 16 BRPM

## 2023-02-03 DIAGNOSIS — I10 ESSENTIAL HYPERTENSION: Primary | ICD-10-CM

## 2023-02-03 RX ORDER — CHLORTHALIDONE 25 MG/1
25 TABLET ORAL DAILY
Qty: 30 TABLET | Refills: 5 | Status: CANCELLED | OUTPATIENT
Start: 2023-02-03

## 2023-02-03 NOTE — ASSESSMENT & PLAN NOTE
Uncontrolled  BP above goal today at 158/80  Reviewed BP goals with patient  Goal BP <140/90 as per JNC 8 guidelines  HCTZ discontinued on 7/12/21  Last microalbumin/cr ratio and CMP (10/26/22) wnl   PE significant for mild pitting edema to mid calf, likely 2/2 amlodipine use  Advise patient report worsening LE should symptoms arise  Discussed initiating chlorthalidone now vs recording blood pressure values at home for 2 weeks and reevaluating at that time  Patient would like to hold off until home BP readings evaluated    - continue with Norvasc 10 mg, daily  - c/w losartan 100 mg, daily  - will start chlorthalidone pending review of home blood pressure values

## 2023-02-03 NOTE — PATIENT INSTRUCTIONS
Take blood pressure every morning for 2 weeks before you eat or drink  Write down the date and the blood pressure value and bring to your next visit  La forma de rimma la presión arterial   LO QUE NECESITA SABER:   La presión arterial es la fuerza de la kristian empujando contra las ackerman de las arterias  Los resultados de la presión arterial se escriben tiffani 2 números  El candy, o número de [place], se llama presión arterial sistólica  Esta es la presión causada por el corazón que empuja la kristian hacia el cuerpo  El [place], o número de [place], es la presión arterial diastólica  Esta es la presión cuando el corazón se relaja y se llena de [place]  Pregúntele a martinez médico cuál debe ser martinez presión arterial  En la mayoría de las personas, liilbeth buena meta de presión arterial es menos de 120/80  INSTRUCCIONES SOBRE EL [facility]:   Llame a martinez médico si:  Martinez presión arterial está más elevada o más baja de lo que se le joaquin indicado que debe estar  Usted tiene preguntas o inquietudes acerca de martinez condición o cuidado  Por qué se debe rimma martinez presión arterial: Es posible que usted no tenga ningún signo o síntoma de presión arterial uday  Es posible que necesite tomarse la presión arterial regularmente para saber con qué frecuencia es uday  La presión arterial uday aumenta martinez riesgo de derrame cerebral, ataque cardíaco o enfermedad renal  Es posible que necesite rimma un medicamento para mantener la presión arterial a un nivel normal  Anote y lleve un registro de martinez presión arterial  Martinez médico puede utilizar [place] de la presión arterial de martinez registro para johnny si parviz medicamentos para la presión arterial están funcionando  La frecuencia que debe rimma martinez presión arterial: Martinez médico le puede recomendar que usted se tome la presión arterial al menos 2 veces al día  Tómese la presión arterial a la misma hora todos los días, por ejemplo lilibeth en la mañana y la otra en la noche   Pregúntele a martinez médico cuándo y con qué frecuencia debería medir martinez presión arterial   Cómo tomarse la presión arterial: Usted puede tomarse martinez presión en casa con un monitor digital para la presión arterial o tensiómetro  Se recomienda leer las instrucciones que vienen con el tensiómetro  El monitor viene con un brazalete ajustable  Pregunte a martinez médico si el brazalete es del tamaño correcto  No coma, lexus, fume o alhaji ejercicio trinidad 30 minutos antes de tomarse la presión arterial     Siéntese y descanse por 5 minutos antes de tomarse la presión arterial     Siéntese con los pies planos en el piso y la espalda contra lilibeth silla  Extienda martinez brazo y apóyelo en liliebth superficie plana  Martinez brazo debe estar a la misma altura que martinez corazón  Asegúrese de que el manguito esté totalmente desinflado  Coloque el manguito de presión arterial contra la piel desnuda a aproximadamente 1 pulgada (2,5 cm) por encima del codo  Coloque la pappas alrededor del brazo de modo que [place]  La lectura de la presión arterial podría ser incorrecta si la abrazadera está muy floja o suelta  Si está usando lilibeth [place], envuelva el brazalete cómodamente alrededor de la [place] [address]  Mantenga la [place] en el mismo nivel que martinez corazón  Encienda el monitor de la presión arterial y [place] Khadra instrucciones  Anote la lectura de martinez presión arterial, la fecha, la hora y en qué brazo se tomó la presión  Tómese la presión arterial 2 veces y escriba ambas lecturas  Use el mismo brazo cada vez  Estas lecturas pueden ser tomadas con 1 minuto de diferencia  Randine Ege necesita saber:  No tome la presión arterial en un brazo lesionado, o si tiene lilibeth sonda intravenosa o lilibeth derivación  Tómese parviz medicamentos para la presión arterial tiffani se le indique  No deje de rimma parviz medicamentos si martinez presión arterial está en martinez valor objetivo    Si tiene la presión arterial en el valor objetivo significa que el medicamento está funcionando correctamente  Acuda a la consulta de control con martinez médico según las indicaciones: Lleve el registro de parviz lecturas de presión arterial  Lleve también el aparato para medir la presión arterial  Los médicos pueden comprobar que usted está usando el aparato correctamente  Anote parviz preguntas para que se acuerde de hacerlas trinidad parviz visitas  © Copyright [employer] 2022 Information is for End User's use only and may not be sold, redistributed or otherwise used for commercial purposes  All illustrations and images included in CareNotes® are the copyrighted property of [employer]  or [address] [place] [address] es sólo para uso en educación  Martinez intención no es darle un consejo médico sobre enfermedades o tratamientos  Colsulte con martinez Valjean Maxcy farmacéutico antes de seguir cualquier régimen médico para saber si es seguro y efectivo para usted

## 2023-02-03 NOTE — PROGRESS NOTES
Name: Valencia Ward      : 1950      MRN: [id number]  Encounter Provider: Raymundo Butterfield MD  Encounter Date: 2/3/2023   Encounter department: [address] [place] [address]  Essential hypertension  Assessment & Plan:  Uncontrolled  BP above goal today at 158/80  Reviewed BP goals with patient  Goal BP <140/90 as per JNC 8 guidelines  HCTZ discontinued on 21  Last microalbumin/cr ratio and CMP (10/26/22) wnl   PE significant for mild pitting edema to mid calf, likely 2/2 amlodipine use  Advise patient report worsening LE should symptoms arise  Discussed initiating chlorthalidone now vs recording blood pressure values at home for 2 weeks and reevaluating at that time  Patient would like to hold off until home BP readings evaluated  - continue with Norvasc 10 mg, daily  - c/w losartan 100 mg, daily  - will start chlorthalidone pending review of home blood pressure values           Subjective     This is a very pleasant 67 y o  female who presents to the clinic for management of their chronic medical conditions  Patient's medical conditions are stable unless noted otherwise above  Patient has not had any recent hospitalizations, or medical emergencies since last visit  Patient has no further complaints other than what is mentioned in the ROS  Review of Systems   Constitutional: Negative for chills and fever  HENT: Negative for congestion, ear pain, rhinorrhea and sinus pain  Eyes: Negative for visual disturbance  Respiratory: Negative for chest tightness, shortness of breath and wheezing  Cardiovascular: Negative for chest pain and palpitations  Gastrointestinal: Negative for abdominal pain, constipation, diarrhea and vomiting  Endocrine: Negative for polyuria  Genitourinary: Negative for dysuria  Musculoskeletal: Negative for myalgias  Neurological: Negative for dizziness, syncope and light-headedness  Psychiatric/Behavioral: Negative for hallucinations, self-injury and suicidal ideas  Past Medical History:   Diagnosis Date   • Echocardiogram abnormal 02/01/2016    Mild LVH with normal systolic function EF > 92%  Grade I Diastolic dysfunction  Aortic sclerosis w/o significant stenosis  Mild insufficiency  Mild MR  Mild TR with normal pulmonary artery pressure  • History of mammogram 09/13/2017    Benign   • History of mammogram 02/15/2016    Benign   • HTN (hypertension)    • Osteopenia determined by x-ray 02/03/2016    osteopenia of the left femoral neck and normal bone marrow density of the left hip total  Osteopenia of the lumbar spine  Past Surgical History:   Procedure Laterality Date   • BLADDER SUSPENSION     • HYSTERECTOMY      age 39     Family History   Problem Relation Age of Onset   • No Known Problems Mother    • No Known Problems Father    • No Known Problems Sister    • No Known Problems Daughter    • No Known Problems Maternal Grandmother    • No Known Problems Maternal Grandfather    • No Known Problems Paternal Grandmother    • No Known Problems Paternal Grandfather    • No Known Problems Maternal Aunt    • No Known Problems Paternal Aunt    • No Known Problems Paternal Aunt    • No Known Problems Paternal Aunt    • No Known Problems Paternal Aunt    • No Known Problems Paternal Aunt      Social History     Socioeconomic History   • Marital status:      Spouse name: None   • Number of children: None   • Years of education: None   • Highest education level: None   Occupational History   • None   Tobacco Use   • Smoking status: Never   • Smokeless tobacco: Never   Substance and Sexual Activity   • Alcohol use:  Yes     Alcohol/week: 1 0 standard drink     Types: 1 Glasses of wine per week   • Drug use: Never   • Sexual activity: None   Other Topics Concern   • None   Social History Narrative   • None     Social Determinants of Health     Financial Resource Strain: Low Risk • Difficulty of Paying Living Expenses: Not hard at all   Food Insecurity: No Food Insecurity   • Worried About [address] [employer] in the Last Year: Never true   • Ran Out of Food in the Last Year: Never true   Transportation Needs: No Transportation Needs   • Lack of Transportation (Medical): No   • Lack of Transportation (Non-Medical): No   Physical Activity: Not on file   Stress: Not on file   Social Connections: Not on file   Intimate Partner Violence: Not on file   Housing Stability: Not on file     Current Outpatient Medications on File Prior to Visit   Medication Sig   • acetaminophen (TYLENOL) 500 mg tablet Take 1 tablet (500 mg total) by mouth every 6 (six) hours as needed for mild pain   • alendronate (FOSAMAX) 70 mg tablet TAKE ONE TABLET BY MOUTH ONCE WEEKLY   • amLODIPine (NORVASC) 10 mg tablet Take 1 tablet (10 mg total) by mouth daily   • atorvastatin (LIPITOR) 40 mg tablet Take 1 tablet (40 mg total) by mouth daily   • benzonatate (TESSALON) 200 MG capsule Take 1 capsule (200 mg total) by mouth 3 (three) times a day as needed for cough (Patient not taking: Reported on 8/24/2022)   • Blood Pressure KIT Take BP reading daily and as needed   (Patient not taking: Reported on 8/24/2022)   • Calcium Carbonate-Vitamin D 600-400 MG-UNIT per tablet Take 1 tablet by mouth daily   • cholecalciferol (VITAMIN D3) 1,000 units tablet Take 2 tablets (2,000 Units total) by mouth daily   • Diclofenac Sodium (VOLTAREN) 1 % Apply 2 g topically 4 (four) times a day   • fluticasone (FLONASE) 50 mcg/act nasal spray 1 spray into each nostril daily   • loratadine (CLARITIN) 10 mg tablet Take 1 tablet (10 mg total) by mouth daily   • losartan (COZAAR) 100 MG tablet Take 1 tablet (100 mg total) by mouth daily     Allergies   Allergen Reactions   • Aspirin    • Penicillins Other (See Comments) and Rash     Immunization History   Administered Date(s) Administered   • COVID-19 MODERNA VACC 0 5 ML IM 01/31/2021, 02/28/2021   • INFLUENZA 01/25/2016, 12/16/2016, 01/26/2018, 04/13/2022   • Influenza, high dose seasonal 0 7 mL 01/28/2019, 11/09/2020, 04/13/2022, 10/20/2022   • Pneumococcal Conjugate 13-Valent 04/12/2017   • Pneumococcal Polysaccharide PPV23 01/26/2018   • Tdap 04/12/2017       Objective     /80 (BP Location: Left arm, Patient Position: Sitting, Cuff Size: Large)   Pulse 80   Temp (!) 96 9 °F (36 1 °C) (Temporal)   Resp 16   Ht 5' 4" (1 626 m)   Wt 78 kg (172 lb)   SpO2 99%   BMI 29 52 kg/m²     Physical Exam  Constitutional:       Appearance: Normal appearance  HENT:      Head: Normocephalic and atraumatic  Nose: Nose normal    Eyes:      Conjunctiva/sclera: Conjunctivae normal    Cardiovascular:      Rate and Rhythm: Normal rate and regular rhythm  Heart sounds: Normal heart sounds  Pulmonary:      Effort: Pulmonary effort is normal       Breath sounds: Normal breath sounds  Musculoskeletal:         General: Normal range of motion  Cervical back: Normal range of motion  Right lower leg: Edema (trace pitting to mid calf) present  Left lower leg: Edema (trace pitting to mid calf) present  Skin:     General: Skin is warm and dry  Neurological:      Mental Status: She is alert and oriented to person, place, and time     Psychiatric:         Behavior: Behavior normal        José Miguel Magana MD

## 2023-02-06 ENCOUNTER — TELEPHONE (OUTPATIENT)
Dept: FAMILY MEDICINE CLINIC | Facility: CLINIC | Age: 73
End: 2023-02-06

## 2023-02-06 NOTE — TELEPHONE ENCOUNTER
PATIENT CAME IN OFFICE STATING PROVIDER WAS GOING TO SEND A REFERRAL FOR AN XRAY SHE WENT TO [facility] AND THERE WAS NOTHING IN HER CHART   SHE IS REQUESTING IF THE PROVIDER COULD PUT THE REFERRAL IN AND GIVE HER A CALL TO NOTIFY HER     PLEASE ADVISE

## 2023-02-08 DIAGNOSIS — M25.551 RIGHT HIP PAIN: Primary | ICD-10-CM

## 2023-02-09 ENCOUNTER — HOSPITAL ENCOUNTER (OUTPATIENT)
Dept: RADIOLOGY | Facility: HOSPITAL | Age: 73
Discharge: HOME | End: 2023-02-09

## 2023-02-09 DIAGNOSIS — M25.551 RIGHT HIP PAIN: ICD-10-CM

## 2023-02-17 ENCOUNTER — CLINICAL SUPPORT (OUTPATIENT)
Dept: FAMILY MEDICINE CLINIC | Facility: CLINIC | Age: 73
End: 2023-02-17

## 2023-02-17 VITALS — DIASTOLIC BLOOD PRESSURE: 78 MMHG | HEART RATE: 78 BPM | SYSTOLIC BLOOD PRESSURE: 134 MMHG

## 2023-02-17 DIAGNOSIS — I10 ESSENTIAL HYPERTENSION: Primary | ICD-10-CM

## 2023-03-06 ENCOUNTER — OFFICE VISIT (OUTPATIENT)
Dept: FAMILY MEDICINE CLINIC | Facility: CLINIC | Age: 73
End: 2023-03-06

## 2023-03-06 VITALS
HEART RATE: 71 BPM | HEIGHT: 64 IN | SYSTOLIC BLOOD PRESSURE: 120 MMHG | TEMPERATURE: 96.2 F | OXYGEN SATURATION: 96 % | RESPIRATION RATE: 16 BRPM | WEIGHT: 171 LBS | DIASTOLIC BLOOD PRESSURE: 70 MMHG | BODY MASS INDEX: 29.19 KG/M2

## 2023-03-06 DIAGNOSIS — M70.61 TROCHANTERIC BURSITIS OF RIGHT HIP: ICD-10-CM

## 2023-03-06 DIAGNOSIS — I10 ESSENTIAL HYPERTENSION: Primary | ICD-10-CM

## 2023-03-06 DIAGNOSIS — Z11.59 NEED FOR HEPATITIS C SCREENING TEST: ICD-10-CM

## 2023-03-06 RX ORDER — ACETAMINOPHEN 500 MG
1000 TABLET ORAL EVERY 8 HOURS PRN
Qty: 30 TABLET | Refills: 3 | Status: SHIPPED | OUTPATIENT
Start: 2023-03-06

## 2023-03-06 NOTE — ASSESSMENT & PLAN NOTE
BP Readings from Last 3 Encounters:   03/06/23 120/70   02/17/23 134/78   02/03/23 158/80       BP within goal today  Last two visits BP within goal  Reviewed BP goals with patient  Goal BP <140/90 as per JNC 8 guidelines  HCTZ discontinued on 7/12/21 2/2 reports of dizziness and urinary frequency  Patient presented with one week's worth of home BP readings, 85% of which were above goal  Patient would rather not start another antihypertensive agent at this time and would like to attempt lifestyle modifications alone  Last microalbumin/cr ratio and CMP (10/26/22) wnl  Discussed initiating chlorthalidone vs recording blood pressure values at home for 2 weeks and reevaluating  Patient expressed she holding off adding 3rd agent and reevaluating in 2 weeks time      - Discussed diet and exercise modifications at [address] continued home BP monitoring - additional info (including correct way to take BP) included in AVS  - Discussed risks associated with chronically elevated BP and advised if BP remains elevated, additional antihypertensive should be initiated   - continue with Norvasc 10 mg, daily  - c/w losartan 100 mg, daily

## 2023-03-06 NOTE — PATIENT INSTRUCTIONS
Call central scheduling to book appointment for Physical Therapy: [phone]     Breakfast: avoid foods with added sugar; try eating omelette or oatmeal or fruit with plain greek yogurt  Lunch and dinner: half your plate should be vegetables, 1/4 non-processed carbs (eg rice and sweet potato ok, bread and pasta is processed), and 1/4 protein (eg fish, lean meats)  Snacks: salad, fruit, nuts etc    Exercise: 30-50 minutes per day 5 days a week    Keep a food diary  Write down everything you eat in a day for 1 month  Continue to log your blood pressure  Goal Blood pressure is less than 140/90  Cómo leer la presión arterial   LO QUE NECESITA SABER:   La presión arterial es la fuerza de la kristian empujando contra las ackerman de las arterias  Los resultados de la presión arterial se escriben tiffani 2 números  El candy, o número de [facility], se llama presión arterial sistólica  Esta es la presión causada por el corazón que empuja la kristian hacia el cuerpo  El [place], o número de [place], es la presión arterial diastólica  Esta es la presión cuando el corazón se relaja y se llena de [place]  Pregúntele a martinez médico cuál debe ser martinez presión arterial  En la mayoría de las personas, lilibeth buena meta de presión arterial es menos de 120/80  INSTRUCCIONES SOBRE EL [facility]:   Llame a martinez médico si:  Martinez presión arterial está más elevada o más baja de lo que se le joaquin indicado que debe estar  Usted tiene preguntas o inquietudes acerca de martinez condición o cuidado  Por qué debe medirse la presión arterial: Es posible que usted no tenga ningún signo o síntoma de presión arterial uday  Es posible que necesite medirse la presión arterial regularmente para saber con qué frecuencia es uday   La presión arterial uday aumenta martinez riesgo de derrame cerebral, ataque cardíaco o enfermedad renal  Es posible que necesite rimma un medicamento para mantener la presión arterial a un nivel normal  Anote y lleve un registro de las lecturas de martinez presión arterial  Martinez médico puede utilizar los [place] para johnny si parviz medicamentos para la presión arterial están funcionando  Frecuencia que debe medir martinez presión arterial: Martinez médico le puede recomendar que usted se mida la presión arterial al menos 2 veces al día  Alhaji la medición a la misma hora todos los días, por ejemplo lilibeth en la mañana y la otra en la noche  Cómo rimma las lecturas de la presión arterial: Usted puede medirse la presión arterial en casa con un monitor digital para la presión arterial  Se recomienda leer las instrucciones que vienen con el tensiómetro  El monitor viene con un brazalete ajustable  Pregunte a martinez médico si el brazalete es del tamaño correcto  No coma, lexus, fume o alhaji ejercicio trinidad 30 minutos antes de medirse la presión arterial     Descanse por 5 minutos antes de comenzar  No hable mientras se mide la presión arterial     Siéntese con los pies planos en el piso y la espalda contra lilibeth silla  Extienda martinez brazo y apóyelo en lilibeth superficie plana  El brazo debe estar al nivel del pecho  No mueva el brazo mientras se mide la presión arterial     Asegúrese de que el manguito esté totalmente desinflado  Coloque el manguito de presión arterial contra la piel desnuda a aproximadamente 1 pulgada (2 5 cm) por encima del codo  Coloque la pappas alrededor del brazo de modo que [place]  La lectura de la presión arterial podría ser incorrecta si la abrazadera está muy floja o suelta  Si está usando lilibeth [place], envuelva el brazalete cómodamente alrededor de la [place]  Mantenga la [place] en el mismo nivel que martinez corazón  Encienda el monitor de la presión arterial y [place] Khadra instrucciones  Anote el valor de martinez presión arterial, la fecha, la hora y en qué brazo se midió la presión  Mídase la presión arterial 2 veces y escriba ambas lecturas  Use el mismo brazo cada vez  Estas lecturas pueden ser tomadas con 1 minuto de diferencia         [place] necesita saber:  No tome la presión arterial en un brazo lesionado, o si tiene lilibeth sonda intravenosa o lilibeth derivación  Es probable que la lectura no sea correcta  No deje de rimma parviz medicamentos si martinez presión arterial está en martinez valor objetivo  Si tiene la presión arterial en el valor objetivo significa que el medicamento está funcionando correctamente  Tómese parviz medicamentos para la presión arterial tiffani se le indique  Acuda a la consulta de control con martinez médico según las indicaciones: Lleve el registro de parviz lecturas de presión arterial  Lleve también el aparato para medir la presión arterial  Los médicos pueden comprobar que usted está usando el aparato correctamente  Anote parviz preguntas para que se acuerde de hacerlas trinidad parviz visitas  © Copyright Minoo Manner 2022 Information is for End User's use only and may not be sold, redistributed or otherwise used for commercial purposes  Esta información es sólo para uso en educación  Martinez intención no es darle un consejo médico sobre enfermedades o tratamientos  Colsulte con martinez Yvosonny Dowling farmacéutico antes de seguir cualquier régimen médico para saber si es seguro y efectivo para usted

## 2023-03-08 PROBLEM — M70.61 TROCHANTERIC BURSITIS OF RIGHT HIP: Status: ACTIVE | Noted: 2023-03-08

## 2023-03-08 NOTE — ASSESSMENT & PLAN NOTE
PE significant for tenderness on palpation of lateral hip  Negative log roll test  Denies any true hip pain  Xray negative for degenerative hip disease  Have advised use of Tylenol and voltaren gel as per orders below  Advise rest and application of ice  Discussed benefits of physical therapy  Advised patient to call central scheduling to book PT (referral placed at last visit)

## 2023-03-08 NOTE — PROGRESS NOTES
Name: Julisa Waddell      : 1950      MRN: [id number]  Encounter Provider: Ruchi Lopez MD  Encounter Date: 3/6/2023   Encounter department: [address] [place] [address]  Essential hypertension  Assessment & Plan:  BP Readings from Last 3 Encounters:   23 120/70   23 134/78   23 158/80       BP within goal today  Last two visits BP within goal  Reviewed BP goals with patient  Goal BP <140/90 as per J[facility] 8 guidelines  HCTZ discontinued on 21 2/2 reports of dizziness and urinary frequency  Patient presented with one week's worth of home BP readings, 85% of which were above goal  Patient would rather not start another antihypertensive agent at this time and would like to attempt lifestyle modifications alone  Last microalbumin/cr ratio and CMP (10/26/22) wnl  Discussed initiating chlorthalidone vs recording blood pressure values at home for 2 weeks and reevaluating  Patient expressed she holding off adding 3rd agent and reevaluating in 2 weeks time  - Discussed diet and exercise modifications at [address] continued home BP monitoring - additional info (including correct way to take BP) included in AVS  - Discussed risks associated with chronically elevated BP and advised if BP remains elevated, additional antihypertensive should be initiated   - continue with Norvasc 10 mg, daily  - c/w losartan 100 mg, daily      2  Trochanteric bursitis of right hip  Assessment & Plan:  PE significant for tenderness on palpation of lateral hip  Negative log roll test  Denies any true hip pain  Xray negative for degenerative hip disease  Have advised use of Tylenol and voltaren gel as per orders below  Advise rest and application of ice  Discussed benefits of physical therapy  Advised patient to call central scheduling to book PT (referral placed at last visit)  Orders:  -     acetaminophen (TYLENOL) 500 mg tablet;  Take 2 tablets (1,000 mg total) by mouth every 8 (eight) hours as needed for mild pain or moderate pain  -     Diclofenac Sodium (VOLTAREN) 1 %; Apply 2 g topically 4 (four) times a day    3  Need for hepatitis C screening test  -     Hepatitis C Antibody ([facility]); Future       RTC in 2 months for HTN and Prediabetes f/u    Subjective     This is a very pleasant 67 y o  female who presents to the clinic for management of their chronic medical conditions  Patient's medical conditions are stable unless noted otherwise above  Patient has not had any recent hospitalizations, or medical emergencies since last visit  Patient has no further complaints other than what is mentioned in the ROS  Review of Systems   Constitutional: Negative for chills and fever  HENT: Negative for congestion, ear pain, rhinorrhea and sinus pain  Eyes: Negative for visual disturbance  Respiratory: Negative for chest tightness, shortness of breath and wheezing  Cardiovascular: Negative for chest pain and palpitations  Gastrointestinal: Negative for abdominal pain, constipation, diarrhea and vomiting  Endocrine: Negative for polyuria  Genitourinary: Negative for dysuria  Musculoskeletal: Positive for arthralgias (chronic hip pain)  Negative for myalgias  Neurological: Negative for dizziness, syncope and light-headedness  Psychiatric/Behavioral: Negative for hallucinations, self-injury and suicidal ideas  Past Medical History:   Diagnosis Date   • Echocardiogram abnormal 02/01/2016    Mild LVH with normal systolic function EF > 23%  Grade I Diastolic dysfunction  Aortic sclerosis w/o significant stenosis  Mild insufficiency  Mild MR  Mild TR with normal pulmonary artery pressure      • History of mammogram 09/13/2017    Benign   • History of mammogram 02/15/2016    Benign   • HTN (hypertension)    • Osteopenia determined by x-ray 02/03/2016    osteopenia of the left femoral neck and normal bone marrow density of the left hip total  Osteopenia of the lumbar spine  Past Surgical History:   Procedure Laterality Date   • BLADDER SUSPENSION     • HYSTERECTOMY      age 39     Family History   Problem Relation Age of Onset   • No Known Problems Mother    • No Known Problems Father    • No Known Problems Sister    • No Known Problems Daughter    • No Known Problems Maternal Grandmother    • No Known Problems Maternal Grandfather    • No Known Problems Paternal Grandmother    • No Known Problems Paternal Grandfather    • No Known Problems Maternal Aunt    • No Known Problems Paternal Aunt    • No Known Problems Paternal Aunt    • No Known Problems Paternal Aunt    • No Known Problems Paternal Aunt    • No Known Problems Paternal Aunt      Social History     Socioeconomic History   • Marital status:      Spouse name: None   • Number of children: None   • Years of education: None   • Highest education level: None   Occupational History   • None   Tobacco Use   • Smoking status: Never   • Smokeless tobacco: Never   Substance and Sexual Activity   • Alcohol use: Yes     Alcohol/week: 1 0 standard drink     Types: 1 Glasses of wine per week   • Drug use: Never   • Sexual activity: None   Other Topics Concern   • None   Social History Narrative   • None     Social Determinants of Health     Financial Resource Strain: Low Risk    • Difficulty of Paying Living Expenses: Not hard at all   Food Insecurity: No Food Insecurity   • Worried About Running Out of Food in the Last Year: Never true   • Ran Out of Food in the Last Year: Never true   Transportation Needs: No Transportation Needs   • Lack of Transportation (Medical): No   • Lack of Transportation (Non-Medical):  No   Physical Activity: Not on file   Stress: Not on file   Social Connections: Not on file   Intimate Partner Violence: Not on file   Housing Stability: Not on file     Current Outpatient Medications on File Prior to Visit   Medication Sig   • alendronate (FOSAMAX) 70 mg tablet TAKE ONE TABLET BY MOUTH ONCE WEEKLY   • amLODIPine (NORVASC) 10 mg tablet Take 1 tablet (10 mg total) by mouth daily   • atorvastatin (LIPITOR) 40 mg tablet Take 1 tablet (40 mg total) by mouth daily   • benzonatate (TESSALON) 200 MG capsule Take 1 capsule (200 mg total) by mouth 3 (three) times a day as needed for cough (Patient not taking: Reported on 8/24/2022)   • Blood Pressure KIT Take BP reading daily and as needed  (Patient not taking: Reported on 8/24/2022)   • Calcium Carbonate-Vitamin D 600-400 MG-UNIT per tablet Take 1 tablet by mouth daily   • cholecalciferol (VITAMIN D3) 1,000 units tablet Take 2 tablets (2,000 Units total) by mouth daily   • fluticasone (FLONASE) 50 mcg/act nasal spray 1 spray into each nostril daily   • loratadine (CLARITIN) 10 mg tablet Take 1 tablet (10 mg total) by mouth daily   • losartan (COZAAR) 100 MG tablet Take 1 tablet (100 mg total) by mouth daily     Allergies   Allergen Reactions   • Aspirin    • Penicillins Other (See Comments) and Rash     Immunization History   Administered Date(s) Administered   • COVID-19 MODERNA VACC 0 5 ML IM 01/31/2021, 02/28/2021   • INFLUENZA 01/25/2016, 12/16/2016, 01/26/2018, 04/13/2022   • Influenza, high dose seasonal 0 7 mL 01/28/2019, 11/09/2020, 04/13/2022, 10/20/2022   • Pneumococcal Conjugate 13-Valent 04/12/2017   • Pneumococcal Polysaccharide PPV23 01/26/2018   • Tdap 04/12/2017       Objective     /70 (BP Location: Left arm, Patient Position: Sitting, Cuff Size: Large)   Pulse 71   Temp (!) 96 2 °F (35 7 °C) (Temporal)   Resp 16   Ht 5' 4" (1 626 m)   Wt 77 6 kg (171 lb)   SpO2 96%   BMI 29 35 kg/m²     Physical Exam  Constitutional:       Appearance: Normal appearance  HENT:      Head: Normocephalic and atraumatic  Nose: Nose normal    Eyes:      Conjunctiva/sclera: Conjunctivae normal    Cardiovascular:      Rate and Rhythm: Normal rate     Pulmonary:      Effort: Pulmonary effort is normal  Musculoskeletal:         General: Tenderness (on palpation of right lateral hip) present  Normal range of motion  Cervical back: Normal range of motion  Skin:     General: Skin is warm and dry  Neurological:      Mental Status: She is alert and oriented to person, place, and time  Sensory: No sensory deficit     Psychiatric:         Behavior: Behavior normal        Maurilio Major MD

## 2023-03-17 ENCOUNTER — EVALUATION (OUTPATIENT)
Dept: PHYSICAL THERAPY | Facility: CLINIC | Age: 73
End: 2023-03-17

## 2023-03-17 DIAGNOSIS — M17.11 PRIMARY OSTEOARTHRITIS OF RIGHT KNEE: Primary | ICD-10-CM

## 2023-03-17 NOTE — PROGRESS NOTES
PT Evaluation     Today's date: 3/17/2023  Patient name: Pili Gasca  : 1950  MRN: [id number]  Referring provider: Lorrie Cornejo MD  Dx:   Encounter Diagnosis     ICD-10-CM    1  Primary osteoarthritis of right knee  M17 11 Ambulatory Referral to Physical Therapy                     Assessment  Assessment details: Shraddha Rosario is a pleasant 67 y o  female who presents with signs and symptoms correlating with referring diagnosis  No further referral appears necessary at this time based upon examination results  The patient's greatest concerns are decreasing pain to improve motion and tolerance to activities  She presents with a movement impairment diagnosis of hypomobile hip extension ROM  This also presents with decreased tolerance to all PROM of the hip, poor strength, and limited lumbar mobility  Educated on strengthening and improved mobility at home to return to function  Will require concise explanation  Negative prognostic indicators: none  Positive prognostic indicators: good motivation  Please contact me if you have any further questions or recommendations  Thank you very much for the kind referral       Impairments: abnormal or restricted ROM, abnormal movement, activity intolerance, impaired physical strength and pain with function    Symptom irritability: moderateUnderstanding of Dx/Px/POC: good   Prognosis: good    Goals  STGs  1  Decrease pain by 20% in 2-4 weeks  2  Improve hip ROM by 10 degrees in 2-4 weeks  3  Improve hip RLE strength by 1/3 grade in 2-4 weeks  LTGs  1  Decrease pain by 60% in 6-8 weeks  2  Improve walking tolerance to >30 minutes in 6-8 weeks  3  Perform ADLs without pain in 6-8 weeks          Plan  Patient would benefit from: skilled physical therapy  Referral necessary: No  Planned modality interventions: cryotherapy, TENS and thermotherapy: hydrocollator packs  Planned therapy interventions: manual therapy, therapeutic training, stretching, strengthening, therapeutic activities, therapeutic exercise, patient education, activity modification, neuromuscular re-education and home exercise program  Frequency: 2x week  Duration in weeks: 8  Treatment plan discussed with: patient        Subjective Evaluation    History of Present Illness  Mechanism of injury: Pt is a 67 y o  female presenting w/ R hip pain starting 2 years ago insidiously in the knee and radiated up into the hip  However, the knee pain improved with an injection and she was hoping the hip pain would disappear too  She noticed that the pain limits her from ambulating long distances  There are no previous history of low back pain       Neurological signs:  None   Red Flags: none             Recurrent probem    Quality of life: good    Pain  Current pain ratin  At best pain ratin  At worst pain rating: 10  Quality: tight, sharp and radiating  Relieving factors: change in position  Aggravating factors: stair climbing, walking, standing and lifting  Progression: no change    Social Support    Employment status: not working  Patient Goals  Patient goals for therapy: increased strength, decreased pain, increased motion, independence with ADLs/IADLs and return to sport/leisure activities          Objective     Active Range of Motion     Lumbar   Flexion:  with pain Restriction level: moderate  Extension:  with pain Restriction level: moderate  Left lateral flexion:  with pain Restriction level: moderate  Right lateral flexion:  with pain Restriction level: moderate    Strength/Myotome Testing     Left Hip   Planes of Motion   Flexion: 4-  Abduction: 4-  Adduction: 4-    Right Hip   Planes of Motion   Flexion: 3+  Extension: 3+  Abduction: 3+  Adduction: 3+    Left Knee   Flexion: 4-  Extension: 4    Right Knee   Flexion: 3+  Extension: 3+    Left Ankle/Foot   Dorsiflexion: 4    Right Ankle/Foot   Dorsiflexion: 3+    Additional Strength Details  Pain limiting strength     Tests Additional Tests Details  Pain with FADIR/MARIA ANTONIA RLE not positive  Increased pain BLE with PROM   Squat WNL              Precautions: none       Manuals 3/17             Hip PROM                                                     Neuro Re-Ed             Bridges  HEP            SLR  HEP             Standing hip abd/ext              Squats                                                     Ther Ex             Bike              Hamstring stretch  HEP            Quad stretch              Steps              Marching                                                     Ther Activity                                       Gait Training                                       Modalities

## 2023-03-21 ENCOUNTER — OFFICE VISIT (OUTPATIENT)
Dept: PHYSICAL THERAPY | Facility: CLINIC | Age: 73
End: 2023-03-21

## 2023-03-21 DIAGNOSIS — M17.11 PRIMARY OSTEOARTHRITIS OF RIGHT KNEE: Primary | ICD-10-CM

## 2023-03-21 NOTE — PROGRESS NOTES
Daily Note     Today's date: 3/21/2023  Patient name: Kelsey Alvarado  : 1950  MRN: [id number]  Referring provider: Hector Bedoya MD  Dx:   Encounter Diagnosis     ICD-10-CM    1  Primary osteoarthritis of right knee  M17 11           Start Time: 1140  Stop Time: 1210  Total time in clinic (min): 30 minutes    Subjective: Pt reports she is feeling ok today, states she is feeling a little bit better since her evaluation  Pt reports compliance with HEP  Objective: See treatment diary below      Assessment: Tolerated treatment well  Patient demonstrated fatigue post treatment and would benefit from continued PT  Pt performed exercises as noted with no signs of increased pain or adverse symptoms  Pt needed VCing for form with HS stretch and hold of stretches  Pt will benefit from further skilled PT to increase strength, flexibility and function  Continue to progress as able  Plan: Continue per plan of care        Precautions: none       Manuals 3/17  3/21           Hip PROM                                                     Neuro Re-Ed  3/21           Bridges  HEP 20x5"           SLR  HEP  2x10 ea           Standing hip abd/ext   2x10 ea           Squats   2x10                                                  Ther Ex  3/21           Bike   6'           Hamstring stretch  HEP 3x20" B           Quad stretch   nv           Steps   0R 2x10           Marching   2x10                                                  Ther Activity                                       Gait Training                                       Modalities

## 2023-03-29 ENCOUNTER — OFFICE VISIT (OUTPATIENT)
Dept: PHYSICAL THERAPY | Facility: CLINIC | Age: 73
End: 2023-03-29

## 2023-03-29 DIAGNOSIS — M17.11 PRIMARY OSTEOARTHRITIS OF RIGHT KNEE: Primary | ICD-10-CM

## 2023-03-29 NOTE — PROGRESS NOTES
"Daily Note     Today's date: 3/29/2023  Patient name: Marianne Portillo  : 1950  MRN: [id number]  Referring provider: Marie De Guzman MD  Dx:   Encounter Diagnosis     ICD-10-CM    1  Primary osteoarthritis of right knee  M17 11                      Subjective: Pt reports pain in her R thigh and knee present today  Objective: See treatment diary below      Assessment:  Pt is challenged w addition of leg press and s/l hip ABD  She needs some cues for proper SLR form  Patient demonstrated fatigue post treatment and would benefit from continued PT  Plan: Continue per plan of care  Progress treatment as tolerated         Precautions: none       Manuals 3/17  3/21 3/29          Hip PROM                                                     Neuro Re-Ed  3/21 3/29          Bridges  HEP 20x5\" 20x5\"          SLR  HEP  2x10 ea 2x10 ea          Standing hip abd/ext   2x10 ea 2x10 ea          Squats   2x10 2x10                                                 Ther Ex  3/21 3/29          Bike   6' 6'          Hamstring stretch  HEP 3x20\" B 3x20\" B          Quad stretch   nv           Steps   0R 2x10           Marching   2x10 2x10          LP   2x10 55#          S/l hip ABD   2x10 ea                       Ther Activity                                       Gait Training                                       Modalities                                            "

## 2023-04-03 ENCOUNTER — OFFICE VISIT (OUTPATIENT)
Dept: PHYSICAL THERAPY | Facility: CLINIC | Age: 73
End: 2023-04-03

## 2023-04-03 DIAGNOSIS — M17.11 PRIMARY OSTEOARTHRITIS OF RIGHT KNEE: Primary | ICD-10-CM

## 2023-04-03 NOTE — PROGRESS NOTES
"Daily Note     Today's date: 4/3/2023  Patient name: Bhupinder Villatoro  : 1950  MRN: [id number]  Referring provider: Nilsa Duncan MD  Dx:   Encounter Diagnosis     ICD-10-CM    1  Primary osteoarthritis of right knee  M17 11           Start Time: 1130  Stop Time: 1200  Total time in clinic (min): 30 minutes    Subjective: Pt reports she is feeling a little better today  Objective: See treatment diary below      Assessment:  Pt tolerated today's session well with no adverse symptoms  Pt continues to work towards goals of increased LE strength and flexibility  Pt needed minimal VC/TCing for form and technique as well as count of exercises  Patient demonstrated fatigue post treatment and would benefit from continued PT  Plan: Continue per plan of care  Progress treatment as tolerated         Precautions: none       Manuals 3/17  3/21 3/29 4/3         Hip PROM                                                     Neuro Re-Ed  3/21 3/29 4/3         Bridges  HEP 20x5\" 20x5\" 20x5\"         SLR  HEP  2x10 ea 2x10 ea 2x10 ea         Standing hip abd/ext   2x10 ea 2x10 ea 2x10         Squats   2x10 2x10 2x10                                                Ther Ex  3/21 3/29 4/3         Bike   6' 6' 6'         Hamstring stretch  HEP 3x20\" B 3x20\" B 3x20\" B         Quad stretch   nv           Steps   0R 2x10           Marching   2x10 2x10 2x10         LP   2x10 55# 2x10 55#         S/l hip ABD   2x10 ea 2x10 ea                      Ther Activity                                       Gait Training                                       Modalities                                            "

## 2023-04-05 ENCOUNTER — OFFICE VISIT (OUTPATIENT)
Dept: PHYSICAL THERAPY | Facility: CLINIC | Age: 73
End: 2023-04-05

## 2023-04-05 DIAGNOSIS — M17.11 PRIMARY OSTEOARTHRITIS OF RIGHT KNEE: Primary | ICD-10-CM

## 2023-04-05 NOTE — PROGRESS NOTES
"Daily Note     Today's date: 2023  Patient name: Rob Real  : 1950  MRN: [id number]  Referring provider: Nick Schmidt MD  Dx:   Encounter Diagnosis     ICD-10-CM    1  Primary osteoarthritis of right knee  M17 11                      Subjective: Pt reports her knee is feeling much better today  Objective: See treatment diary below      Assessment:  Pt does well w progression of today's session and is challenged appropriately  She has some difficulty w step ups, but does well w leg press and SLR  Patient demonstrated fatigue post treatment and would benefit from continued PT  Plan: Continue per plan of care  Progress treatment as tolerated         Precautions: none       Manuals 3/17  3/21 3/29 4/3 4/5        Hip PROM                                                     Neuro Re-Ed  3/21 3/29 4/3 4/5        Bridges  HEP 20x5\" 20x5\" 20x5\"         SLR  HEP  2x10 ea 2x10 ea 2x10 ea 2x10 ea        Standing hip abd/ext   2x10 ea 2x10 ea 2x10 2x10        Squats   2x10 2x10 2x10 2x10                                               Ther Ex  3/21 3/29 4/3 4/5        Bike   6' 6' 6' 6'        Hamstring stretch  HEP 3x20\" B 3x20\" B 3x20\" B 3x20\" B        Quad stretch   nv           Steps   0R 2x10   1R 10x ea        Marching   2x10 2x10 2x10 20x        LP   2x10 55# 2x10 55# 2x15 55#        S/l hip ABD   2x10 ea 2x10 ea 2x10 ea                     Ther Activity                                       Gait Training                                       Modalities                                            "

## 2023-04-25 ENCOUNTER — APPOINTMENT (OUTPATIENT)
Dept: PHYSICAL THERAPY | Facility: CLINIC | Age: 73
End: 2023-04-25
Payer: MEDICARE

## 2023-05-08 ENCOUNTER — OFFICE VISIT (OUTPATIENT)
Dept: FAMILY MEDICINE CLINIC | Facility: CLINIC | Age: 73
End: 2023-05-08

## 2023-05-08 ENCOUNTER — APPOINTMENT (OUTPATIENT)
Dept: LAB | Facility: CLINIC | Age: 73
End: 2023-05-08

## 2023-05-08 VITALS
DIASTOLIC BLOOD PRESSURE: 90 MMHG | BODY MASS INDEX: 29.4 KG/M2 | OXYGEN SATURATION: 99 % | WEIGHT: 172.2 LBS | HEART RATE: 86 BPM | HEIGHT: 64 IN | RESPIRATION RATE: 18 BRPM | TEMPERATURE: 98.2 F | SYSTOLIC BLOOD PRESSURE: 146 MMHG

## 2023-05-08 DIAGNOSIS — I10 ESSENTIAL HYPERTENSION: Primary | ICD-10-CM

## 2023-05-08 DIAGNOSIS — R73.03 PREDIABETES: ICD-10-CM

## 2023-05-08 DIAGNOSIS — Z11.59 NEED FOR HEPATITIS C SCREENING TEST: ICD-10-CM

## 2023-05-08 DIAGNOSIS — I83.813 VARICOSE VEINS OF BOTH LOWER EXTREMITIES WITH PAIN: ICD-10-CM

## 2023-05-08 NOTE — ASSESSMENT & PLAN NOTE
On exam, patient with visible reticular and spider varicosities throughout b/l lower extremities  Patient reports pain and swelling in b/l LE exacerbated with prolonged standing at [employer]       -Advised use of compression stockings while at work, DME script provided to patient  -Recommended lower extremity elevation, decreased salt intake, warm compress, and NSAIDs as needed

## 2023-05-08 NOTE — PROGRESS NOTES
Name: Sol Barnes      : 1950      MRN: [id number]  Encounter Provider: Joseline Vivar MD  Encounter Date: 2023   Encounter department: [address] [place] [address]  Essential hypertension  Assessment & Plan:  BP Readings from Last 3 Encounters:   23 146/90   23 140/78   23 120/70     BP today 156/80, above goal  On repeat, imp;roved to 146/90  Reviewed BP goals with patient  Goal BP <140/90 as per JNC 8 guidelines  Managed on Losartain 100 mg QD, Amlodipine 10 mg QD, and Lipitor 40 mg QD  HCTZ discontinued on 21 2/2 reports of dizziness and urinary frequency  Last microalbumin/cr ratio and CMP (10/26/22) wnl  Patient presented with one week's worth of home BP readings at prior visit, 85% of which were above goal  Patient would rather not start another antihypertensive agent at this time and would like to attempt lifestyle modifications alone  Patient reports since last visit BP has been well controlled at home however she has not been recording values  Discussed initiating chlorthalidone vs continued ambulatory home monitoring  Discussed possible diagnosis of white coat hypertension    - Discussed risks associated with chronically elevated BP and advised if BP remains elevated, additional antihypertensive should be initiated  Have asked her to record values for the next 2 weeks and present for f/u appointment  - continue with Norvasc 10 mg, daily  - c/w losartan 100 mg, daily      2  Prediabetes  Assessment & Plan:  Last HA1C (22) elevated in prediabetic range to 6 0  Patient expressed wanting to attempt lifestyle modifications prior to initiating Metformin  Missed prior follow up appointment  Will retest HA1c at this time  Orders:  -     Hemoglobin A1C; Future    3   Varicose veins of both lower extremities with pain  Assessment & Plan:  On exam, patient with visible reticular and spider varicosities throughout b/l lower extremities  Patient reports pain and swelling in b/l LE exacerbated with prolonged standing at [employer]  -Advised use of compression stockings while at work, DME script provided to patient  -Recommended lower extremity elevation, decreased salt intake, warm compress, and NSAIDs as needed    Orders:  -     Compression Stocking         Subjective     This is a very pleasant 67 y o  female who presents to the clinic for management of their chronic medical conditions  Patient's medical conditions are stable unless noted otherwise above  Patient has not had any recent hospitalizations, or medical emergencies since last visit  Patient has no further complaints other than what is mentioned in the ROS  Review of Systems   Constitutional: Negative for chills and fever  HENT: Negative for congestion, ear pain, rhinorrhea and sinus pain  Eyes: Negative for visual disturbance  Respiratory: Negative for chest tightness, shortness of breath and wheezing  Cardiovascular: Negative for chest pain and palpitations  Gastrointestinal: Negative for abdominal pain, constipation, diarrhea and vomiting  Endocrine: Negative for polyuria  Genitourinary: Negative for dysuria  Musculoskeletal: Negative for arthralgias and myalgias  Neurological: Negative for dizziness, syncope and light-headedness  Psychiatric/Behavioral: Negative for hallucinations, self-injury and suicidal ideas  Past Medical History:   Diagnosis Date   • Echocardiogram abnormal 02/01/2016    Mild LVH with normal systolic function EF > 53%  Grade I Diastolic dysfunction  Aortic sclerosis w/o significant stenosis  Mild insufficiency  Mild MR  Mild TR with normal pulmonary artery pressure      • History of mammogram 09/13/2017    Benign   • History of mammogram 02/15/2016    Benign   • HTN (hypertension)    • Osteopenia determined by x-ray 02/03/2016    osteopenia of the left femoral neck and normal bone marrow density of the left hip total  Osteopenia of the lumbar spine  Past Surgical History:   Procedure Laterality Date   • BLADDER SUSPENSION     • HYSTERECTOMY      age 39     Family History   Problem Relation Age of Onset   • No Known Problems Mother    • No Known Problems Father    • No Known Problems Sister    • No Known Problems Daughter    • No Known Problems Maternal Grandmother    • No Known Problems Maternal Grandfather    • No Known Problems Paternal Grandmother    • No Known Problems Paternal Grandfather    • No Known Problems Maternal Aunt    • No Known Problems Paternal Aunt    • No Known Problems Paternal Aunt    • No Known Problems Paternal Aunt    • No Known Problems Paternal Aunt    • No Known Problems Paternal Aunt      Social History     Socioeconomic History   • Marital status:      Spouse name: None   • Number of children: None   • Years of education: None   • Highest education level: None   Occupational History   • None   Tobacco Use   • Smoking status: Never   • Smokeless tobacco: Never   Substance and Sexual Activity   • Alcohol use: Yes     Alcohol/week: 1 0 standard drink     Types: 1 Glasses of wine per week   • Drug use: Never   • Sexual activity: None   Other Topics Concern   • None   Social History Narrative   • None     Social Determinants of Health     Financial Resource Strain: Low Risk    • Difficulty of Paying Living Expenses: Not hard at all   Food Insecurity: No Food Insecurity   • Worried About Running Out of Food in the Last Year: Never true   • Ran Out of Food in the Last Year: Never true   Transportation Needs: No Transportation Needs   • Lack of Transportation (Medical): No   • Lack of Transportation (Non-Medical):  No   Physical Activity: Not on file   Stress: Not on file   Social Connections: Not on file   Intimate Partner Violence: Not on file   Housing Stability: Not on file     Current Outpatient Medications on File Prior to Visit   Medication Sig   • acetaminophen (TYLENOL) 500 mg tablet Take 2 tablets (1,000 mg total) by mouth every 8 (eight) hours as needed for mild pain or moderate pain   • acetaminophen (TYLENOL) 500 mg tablet Take 1 tablet (500 mg total) by mouth every 6 (six) hours as needed for moderate pain   • alendronate (FOSAMAX) 70 mg tablet TAKE ONE TABLET BY MOUTH ONCE WEEKLY   • amLODIPine (NORVASC) 10 mg tablet Take 1 tablet (10 mg total) by mouth daily   • atorvastatin (LIPITOR) 40 mg tablet Take 1 tablet (40 mg total) by mouth daily   • benzonatate (TESSALON) 200 MG capsule Take 1 capsule (200 mg total) by mouth 3 (three) times a day as needed for cough (Patient not taking: Reported on 8/24/2022)   • Blood Pressure KIT Take BP reading daily and as needed   (Patient not taking: Reported on 8/24/2022)   • Calcium Carbonate-Vitamin D 600-400 MG-UNIT per tablet Take 1 tablet by mouth daily   • cholecalciferol (VITAMIN D3) 1,000 units tablet Take 2 tablets (2,000 Units total) by mouth daily   • Diclofenac Sodium (VOLTAREN) 1 % Apply 2 g topically 4 (four) times a day   • fluticasone (FLONASE) 50 mcg/act nasal spray 1 spray into each nostril daily   • lidocaine (Lidoderm) 5 % Apply 1 patch topically over 12 hours daily Remove & Discard patch within 12 hours or as directed by MD   • loratadine (CLARITIN) 10 mg tablet Take 1 tablet (10 mg total) by mouth daily   • losartan (COZAAR) 100 MG tablet Take 1 tablet (100 mg total) by mouth daily   • naproxen (Naprosyn) 500 mg tablet Take 1 tablet (500 mg total) by mouth 2 (two) times a day with meals     Allergies   Allergen Reactions   • Aspirin    • Penicillins Other (See Comments) and Rash     Immunization History   Administered Date(s) Administered   • COVID-19 MODERNA VACC 0 5 ML IM 01/31/2021, 02/28/2021   • INFLUENZA 01/25/2016, 12/16/2016, 01/26/2018, 04/13/2022   • Influenza, high dose seasonal 0 7 mL 01/28/2019, 11/09/2020, 04/13/2022, 10/20/2022   • Pneumococcal Conjugate 13-Valent 04/12/2017   • Pneumococcal "Polysaccharide PPV23 01/26/2018   • Tdap 04/12/2017       Objective     /90 (BP Location: Left arm, Patient Position: Sitting, Cuff Size: Standard)   Pulse 86   Temp 98 2 °F (36 8 °C) (Temporal)   Resp 18   Ht 5' 4\" (1 626 m)   Wt 78 1 kg (172 lb 3 2 oz)   SpO2 99%   BMI 29 56 kg/m²     Physical Exam  Constitutional:       Appearance: Normal appearance  HENT:      Head: Normocephalic and atraumatic  Nose: Nose normal    Eyes:      Conjunctiva/sclera: Conjunctivae normal    Cardiovascular:      Rate and Rhythm: Normal rate and regular rhythm  Heart sounds: Normal heart sounds  Pulmonary:      Effort: Pulmonary effort is normal       Breath sounds: Normal breath sounds  Musculoskeletal:         General: Normal range of motion  Cervical back: Normal range of motion  Skin:     General: Skin is warm and dry  Neurological:      Mental Status: She is alert and oriented to person, place, and time     Psychiatric:         Behavior: Behavior normal        Gabriela Soriano MD  "

## 2023-05-08 NOTE — PATIENT INSTRUCTIONS
Cómo leer la presión arterial   LO QUE NECESITA SABER:   La presión arterial es la fuerza de la kristian empujando contra las ackerman de las arterias  Los resultados de la presión arterial se escriben tiffani 2 números  El candy, o número de [place], se llama presión arterial sistólica  Esta es la presión causada por el corazón que empuja la kristian hacia el cuerpo  El [place], o número de [place], es la presión arterial diastólica  Esta es la presión cuando el corazón se relaja y se llena de [place]  Pregúntele a martinez médico cuál debe ser martinez presión arterial  En la mayoría de las personas, lilibeth buena meta de presión arterial es menos de 140/90  INSTRUCCIONES SOBRE EL [facility]:   Llame a martinez médico si:  Martinez presión arterial está más elevada o más baja de lo que se le joaquin indicado que debe estar  Usted tiene preguntas o inquietudes acerca de martinez condición o cuidado  Por qué debe medirse la presión arterial: Es posible que usted no tenga ningún signo o síntoma de presión arterial uday  Es posible que necesite medirse la presión arterial regularmente para saber con qué frecuencia es uday  La presión arterial uday aumenta martinez riesgo de derrame cerebral, ataque cardíaco o enfermedad renal  Es posible que necesite rimma un medicamento para mantener la presión arterial a un nivel normal  Anote y lleve un registro de las lecturas de martinez presión arterial  Martinez médico puede utilizar los Hodgeman para johnny si parviz medicamentos para la presión arterial están funcionando  Frecuencia que debe medir martinez presión arterial: Martinez médico le puede recomendar que usted se mida la presión arterial al menos 2 veces al día  Maegan la medición a la misma hora todos los días, por ejemplo lilibeth en la mañana y la otra en la noche  Cómo rimma las lecturas de la presión arterial: Usted puede medirse la presión arterial en casa con un monitor digital para la presión arterial  Se recomienda leer las instrucciones que vienen con el tensiómetro   El monitor viene con un brazalete ajustable  Pregunte a martinez médico si el brazalete es del tamaño correcto  No coma, lexus, fume o alhaji ejercicio trinidad 30 minutos antes de medirse la presión arterial     Descanse por 5 minutos antes de comenzar  No hable mientras se mide la presión arterial     Siéntese con los pies planos en el piso y la espalda contra lilibeth silla  Extienda martinez brazo y apóyelo en lilibeth superficie plana  El brazo debe estar al nivel del pecho  No mueva el brazo mientras se mide la presión arterial     Asegúrese de que el manguito esté totalmente desinflado  Coloque el manguito de presión arterial contra la piel desnuda a aproximadamente 1 pulgada (2 5 cm) por encima del codo  Coloque la pappas alrededor del brazo de modo que [place]  La lectura de la presión arterial podría ser incorrecta si la abrazadera está muy floja o suelta  Si está usando lilibeth [place], envuelva el brazalete cómodamente alrededor de la [place] [address]  Mantenga la [place] en el mismo nivel que martinez corazón  Encienda el monitor de la presión arterial y [place] Khadra instrucciones  Anote el valor de martinez presión arterial, la fecha, la hora y en qué brazo se midió la presión  Mídase la presión arterial 2 veces y escriba ambas lecturas  Use el mismo brazo cada vez  Estas lecturas pueden ser tomadas con 1 minuto de diferencia  Walter [place] necesita saber:  No tome la presión arterial en un brazo lesionado, o si tiene lilibeth sonda intravenosa o lilibeth derivación  Es probable que la lectura no sea correcta  No deje de rimma parviz medicamentos si martinez presión arterial está en martinez valor objetivo  Si tiene la presión arterial en el valor objetivo significa que el medicamento está funcionando correctamente  Tómese parviz medicamentos para la presión arterial tiffani se le indique      Acuda a la consulta de control con martinez médico según las indicaciones: Lleve el registro de parviz lecturas de presión arterial  Lleve también el aparato para medir la presión arterial  Los médicos pueden comprobar que usted está usando el aparato correctamente  Anote parviz preguntas para que se acuerde de hacerlas trinidad parviz visitas  © Copyright Britt[employer] 2022 Information is for End User's use only and may not be sold, redistributed or otherwise used for commercial purposes  Esta información es sólo para uso en educación  Martinez intención no es darle un consejo médico sobre enfermedades o tratamientos  Colsulte con martinez Lenda Dys farmacéutico antes de seguir cualquier régimen médico para saber si es seguro y efectivo para usted

## 2023-05-08 NOTE — ASSESSMENT & PLAN NOTE
BP Readings from Last 3 Encounters:   05/08/23 146/90   04/11/23 140/78   03/06/23 120/70     BP today 156/80, above goal  On repeat, imp;roved to 146/90  Reviewed BP goals with patient  Goal BP <140/90 as per JNC 8 guidelines  Managed on Losartain 100 mg QD, Amlodipine 10 mg QD, and Lipitor 40 mg QD  HCTZ discontinued on 7/12/21 2/2 reports of dizziness and urinary frequency  Last microalbumin/cr ratio and CMP (10/26/22) wnl  Patient presented with one week's worth of home BP readings at prior visit, 85% of which were above goal  Patient would rather not start another antihypertensive agent at this time and would like to attempt lifestyle modifications alone  Patient reports since last visit BP has been well controlled at home however she has not been recording values  Discussed initiating chlorthalidone vs continued ambulatory home monitoring  Discussed possible diagnosis of white coat hypertension    - Discussed risks associated with chronically elevated BP and advised if BP remains elevated, additional antihypertensive should be initiated  Have asked her to record values for the next 2 weeks and present for f/u appointment     - continue with Norvasc 10 mg, daily  - c/w losartan 100 mg, daily

## 2023-05-08 NOTE — ASSESSMENT & PLAN NOTE
Last HA1C (11/08/22) elevated in prediabetic range to 6 0  Patient expressed wanting to attempt lifestyle modifications prior to initiating Metformin  Missed prior follow up appointment  Will retest HA1c at this time

## 2023-05-08 NOTE — LETTER
May 8, 2023     Patient: Shraddha Colunga  YOB: 1950  Date of Visit: 5/8/2023      To Whom it May Concern:    Shraddha Colunga is under my professional care  Shraddha was seen in my office on 5/8/2023  Candida has medical conditions that put her at risk for adverse complications  As such, I have advised working no more than 40 hours per week  If you have any questions or concerns, please don't hesitate to call           Sincerely,          Suresh Stoll MD        CC: No Recipients

## 2023-05-09 LAB
EST. AVERAGE GLUCOSE BLD GHB EST-MCNC: 120 MG/DL
HBA1C MFR BLD: 5.8 %
HCV AB SER QL: NORMAL

## 2023-05-22 ENCOUNTER — CLINICAL SUPPORT (OUTPATIENT)
Dept: FAMILY MEDICINE CLINIC | Facility: CLINIC | Age: 73
End: 2023-05-22

## 2023-05-22 VITALS — DIASTOLIC BLOOD PRESSURE: 64 MMHG | SYSTOLIC BLOOD PRESSURE: 118 MMHG | HEART RATE: 81 BPM | OXYGEN SATURATION: 95 %

## 2023-05-22 DIAGNOSIS — I10 ESSENTIAL HYPERTENSION: Primary | ICD-10-CM

## 2023-05-26 ENCOUNTER — OFFICE VISIT (OUTPATIENT)
Dept: FAMILY MEDICINE CLINIC | Facility: CLINIC | Age: 73
End: 2023-05-26

## 2023-05-26 ENCOUNTER — HOSPITAL ENCOUNTER (OUTPATIENT)
Dept: RADIOLOGY | Facility: HOSPITAL | Age: 73
End: 2023-05-26

## 2023-05-26 VITALS
SYSTOLIC BLOOD PRESSURE: 118 MMHG | DIASTOLIC BLOOD PRESSURE: 74 MMHG | BODY MASS INDEX: 29.47 KG/M2 | RESPIRATION RATE: 18 BRPM | TEMPERATURE: 98 F | WEIGHT: 172.6 LBS | HEIGHT: 64 IN | HEART RATE: 81 BPM

## 2023-05-26 DIAGNOSIS — I83.813 VARICOSE VEINS OF BOTH LOWER EXTREMITIES WITH PAIN: ICD-10-CM

## 2023-05-26 DIAGNOSIS — M25.562 CHRONIC PAIN OF LEFT KNEE: Primary | ICD-10-CM

## 2023-05-26 DIAGNOSIS — G89.29 CHRONIC PAIN OF LEFT KNEE: Primary | ICD-10-CM

## 2023-05-26 DIAGNOSIS — G89.29 CHRONIC PAIN OF LEFT KNEE: ICD-10-CM

## 2023-05-26 DIAGNOSIS — M25.562 CHRONIC PAIN OF LEFT KNEE: ICD-10-CM

## 2023-05-26 NOTE — PROGRESS NOTES
"Name: Isra Leonardo      : 1950      MRN: [id number]  Encounter Provider: MINDY Peguero  Encounter Date: 2023   Encounter department: [facility]     1  Chronic pain of left knee  Assessment & Plan:  Increased pain and swelling over past several weeks, exacerbated by standing in one position at work, relieved by walking  Last XR in 2020 revealed patellar enthesopathy left MCL calcification consistent with old injury  Tylenol ineffective and NSAIDs bother pt's stomach  Pt states she completed PT   - Update XR, consider referral to Orthopedics for injection  - Tylenol 1000 mg TID PRN, diclofenac gel QID PRN   - Offered work note requesting accommodation (stool for work space) but pt declines  Orders:  -     XR knee 3 vw left non injury; Future; Expected date: 2023    2  Varicose veins of both lower extremities with pain  Assessment & Plan:  +1 edema BLE  Pt has not yet obtained compression stockings ordered by PCP    - Obtain and wear compression stockings, especially when standing at work  Provided reprint of prescription to pt  - Daily physical activity  Elevate legs when seated  Low salt diet  Subjective     HPI     Vietnamese language interpretation services were utilized for this visit  Shraddha presents to the office for c/o left knee pain  Today, pain rated 5-6/10, though is sometimes \"more than 10 \" Denies knee giving out, no grinding or catching  No numbness or tingling distal to knee  Standing for long periods of time at work exacerbates pain, walking helps to relieve pain  Ibuprofen is effective but causes stomach upset  Taking Tylenol 500 to 1000 mg PRN which is minimally effective  Review of Systems   Constitutional: Negative  Respiratory: Negative  Cardiovascular: Negative  Musculoskeletal: Positive for arthralgias, joint swelling and myalgias  Neurological: Negative      All " other systems reviewed and are negative  Past Medical History:   Diagnosis Date   • Echocardiogram abnormal 02/01/2016    Mild LVH with normal systolic function EF > 18%  Grade I Diastolic dysfunction  Aortic sclerosis w/o significant stenosis  Mild insufficiency  Mild MR  Mild TR with normal pulmonary artery pressure  • History of mammogram 09/13/2017    Benign   • History of mammogram 02/15/2016    Benign   • HTN (hypertension)    • Osteopenia determined by x-ray 02/03/2016    osteopenia of the left femoral neck and normal bone marrow density of the left hip total  Osteopenia of the lumbar spine  Past Surgical History:   Procedure Laterality Date   • BLADDER SUSPENSION     • HYSTERECTOMY      age 39     Family History   Problem Relation Age of Onset   • No Known Problems Mother    • No Known Problems Father    • No Known Problems Sister    • No Known Problems Daughter    • No Known Problems Maternal Grandmother    • No Known Problems Maternal Grandfather    • No Known Problems Paternal Grandmother    • No Known Problems Paternal Grandfather    • No Known Problems Maternal Aunt    • No Known Problems Paternal Aunt    • No Known Problems Paternal Aunt    • No Known Problems Paternal Aunt    • No Known Problems Paternal Aunt    • No Known Problems Paternal Aunt      Social History     Socioeconomic History   • Marital status:      Spouse name: None   • Number of children: None   • Years of education: None   • Highest education level: None   Occupational History   • None   Tobacco Use   • Smoking status: Never   • Smokeless tobacco: Never   Substance and Sexual Activity   • Alcohol use:  Yes     Alcohol/week: 1 0 standard drink of alcohol     Types: 1 Glasses of wine per week   • Drug use: Never   • Sexual activity: None   Other Topics Concern   • None   Social History Narrative   • None     Social Determinants of Health     Financial Resource Strain: Low Risk  (7/14/2022)    Overall Financial Resource Strain (CARDIA)    • Difficulty of Paying Living Expenses: Not hard at all   Food Insecurity: No Food Insecurity (7/14/2022)    Hunger Vital Sign    • Worried About Running Out of Food in the Last Year: Never true    • Ran Out of Food in the Last Year: Never true   Transportation Needs: No Transportation Needs (7/14/2022)    PRAPARE - Transportation    • Lack of Transportation (Medical): No    • Lack of Transportation (Non-Medical): No   Physical Activity: Not on file   Stress: Not on file   Social Connections: Not on file   Intimate Partner Violence: Not on file   Housing Stability: Not on file     Current Outpatient Medications on File Prior to Visit   Medication Sig   • acetaminophen (TYLENOL) 500 mg tablet Take 2 tablets (1,000 mg total) by mouth every 8 (eight) hours as needed for mild pain or moderate pain   • alendronate (FOSAMAX) 70 mg tablet TAKE ONE TABLET BY MOUTH ONCE WEEKLY   • amLODIPine (NORVASC) 10 mg tablet Take 1 tablet (10 mg total) by mouth daily   • atorvastatin (LIPITOR) 40 mg tablet Take 1 tablet (40 mg total) by mouth daily   • benzonatate (TESSALON) 200 MG capsule Take 1 capsule (200 mg total) by mouth 3 (three) times a day as needed for cough (Patient not taking: Reported on 8/24/2022)   • Blood Pressure KIT Take BP reading daily and as needed   (Patient not taking: Reported on 8/24/2022)   • Calcium Carbonate-Vitamin D 600-400 MG-UNIT per tablet Take 1 tablet by mouth daily   • cholecalciferol (VITAMIN D3) 1,000 units tablet Take 2 tablets (2,000 Units total) by mouth daily   • Diclofenac Sodium (VOLTAREN) 1 % Apply 2 g topically 4 (four) times a day   • fluticasone (FLONASE) 50 mcg/act nasal spray 1 spray into each nostril daily   • lidocaine (Lidoderm) 5 % Apply 1 patch topically over 12 hours daily Remove & Discard patch within 12 hours or as directed by MD   • loratadine (CLARITIN) 10 mg tablet Take 1 tablet (10 mg total) by mouth daily   • losartan (COZAAR) 100 MG tablet Take "1 tablet (100 mg total) by mouth daily   • naproxen (Naprosyn) 500 mg tablet Take 1 tablet (500 mg total) by mouth 2 (two) times a day with meals   • [DISCONTINUED] acetaminophen (TYLENOL) 500 mg tablet Take 1 tablet (500 mg total) by mouth every 6 (six) hours as needed for moderate pain     Allergies   Allergen Reactions   • Aspirin    • Penicillins Other (See Comments) and Rash     Immunization History   Administered Date(s) Administered   • COVID-19 MODERNA VACC 0 5 ML IM 2021, 2021   • INFLUENZA 2016, 2016, 2018, 2022   • Influenza, high dose seasonal 0 7 mL 2019, 2020, 2022, 10/20/2022   • Pneumococcal Conjugate 13-Valent 2017   • Pneumococcal Polysaccharide PPV23 2018   • Tdap 2017       Objective     /74 (BP Location: Left arm, Patient Position: Sitting, Cuff Size: Standard)   Pulse 81   Temp 98 °F (36 7 °C) (Temporal)   Resp 18   Ht 5' 4\" (1 626 m)   Wt 78 3 kg (172 lb 9 6 oz)   BMI 29 63 kg/m²     Physical Exam  Vitals reviewed  Constitutional:       General: She is not in acute distress  Appearance: She is overweight  She is not ill-appearing or diaphoretic  HENT:      Head: Normocephalic and atraumatic  Cardiovascular:      Rate and Rhythm: Normal rate and regular rhythm  Heart sounds: Normal heart sounds  No murmur heard  Pulmonary:      Effort: Pulmonary effort is normal  No tachypnea  Breath sounds: Normal breath sounds  No decreased breath sounds or wheezing  Musculoskeletal:      Right knee: Normal       Left knee: Swelling present  No deformity, erythema or crepitus  Normal range of motion  Tenderness present over the medial joint line and patellar tendon  Right lower le+ Pitting Edema present  Left lower le+ Pitting Edema present  Skin:     General: Skin is warm and dry  Neurological:      Mental Status: She is alert and oriented to person, place, and time        Motor: " Motor function is intact  No weakness  Gait: Gait is intact     Psychiatric:         Attention and Perception: Attention normal          Mood and Affect: Mood and affect normal          Behavior: Behavior normal        MINDY Bull

## 2023-05-26 NOTE — ASSESSMENT & PLAN NOTE
+1 edema BLE  Pt has not yet obtained compression stockings ordered by PCP    - Obtain and wear compression stockings, especially when standing at work  Provided reprint of prescription to pt  - Daily physical activity  Elevate legs when seated  Low salt diet

## 2023-05-26 NOTE — ASSESSMENT & PLAN NOTE
Increased pain and swelling over past several weeks, exacerbated by standing in one position at work, relieved by walking  Last XR in 6/2020 revealed patellar enthesopathy left MCL calcification consistent with old injury  Tylenol ineffective and NSAIDs bother pt's stomach  Pt states she completed PT   - Update XR, consider referral to Orthopedics for injection  - Tylenol 1000 mg TID PRN, diclofenac gel QID PRN   - Offered work note requesting accommodation (stool for work space) but pt declines

## 2023-06-08 ENCOUNTER — PROCEDURE VISIT (OUTPATIENT)
Dept: FAMILY MEDICINE CLINIC | Facility: CLINIC | Age: 73
End: 2023-06-08

## 2023-06-08 VITALS
SYSTOLIC BLOOD PRESSURE: 138 MMHG | OXYGEN SATURATION: 98 % | RESPIRATION RATE: 18 BRPM | HEIGHT: 64 IN | BODY MASS INDEX: 29.12 KG/M2 | HEART RATE: 61 BPM | TEMPERATURE: 97.5 F | WEIGHT: 170.6 LBS | DIASTOLIC BLOOD PRESSURE: 80 MMHG

## 2023-06-08 DIAGNOSIS — M17.12 PRIMARY OSTEOARTHRITIS OF LEFT KNEE: Primary | ICD-10-CM

## 2023-06-08 RX ORDER — TRIAMCINOLONE ACETONIDE 40 MG/ML
40 INJECTION, SUSPENSION INTRA-ARTICULAR; INTRAMUSCULAR
Status: COMPLETED | OUTPATIENT
Start: 2023-06-08 | End: 2023-06-08

## 2023-06-08 RX ORDER — LIDOCAINE HYDROCHLORIDE 10 MG/ML
3 INJECTION, SOLUTION INFILTRATION; PERINEURAL
Status: COMPLETED | OUTPATIENT
Start: 2023-06-08 | End: 2023-06-08

## 2023-06-08 RX ADMIN — LIDOCAINE HYDROCHLORIDE 3 ML: 10 INJECTION, SOLUTION INFILTRATION; PERINEURAL at 09:20

## 2023-06-08 RX ADMIN — TRIAMCINOLONE ACETONIDE 40 MG: 40 INJECTION, SUSPENSION INTRA-ARTICULAR; INTRAMUSCULAR at 09:20

## 2023-06-08 NOTE — ASSESSMENT & PLAN NOTE
Chronic  Xrays showing degenerative changes, reviewed  Discussed with patient this will provide temporary relief and she will benefit from weight loss and PT  Will place referral at this time  A steroid injection was performed at left anterolateral joint using 1% plain Lidocaine and 80 mg of Kenalog  This was well tolerated  Patient was informed of expected pain relief within 4 to 6 hours secondary to anesthetic  They were advised that they may experience a temporary increase in pain within the next 6 to 24 hours as the anesthetic wears off  Patient was advised to apply ice and take Tylenol 1000 mg Q6h for pain  Patient was informed that corticosteroid injection will likely take effect within 24 to 48 hours at which point the pain should significantly improve  Patient informed that it can take up to 7-10 days to feel full extent of improvement  Patient was advised that if their knee becomes hot, red, or swollen that they should return to the clinic as soon as possible or go to local ED for evaluation

## 2023-06-08 NOTE — PROGRESS NOTES
Name: Naresh Salazar      : 1950      MRN: [id number]  Encounter Provider: Maria Luisa Escalona MD  Encounter Date: 2023   Encounter department: [address] [place] [address]     1  Primary osteoarthritis of left knee  Assessment & Plan:  Chronic  Xrays showing degenerative changes, reviewed  Discussed with patient this will provide temporary relief and she will benefit from weight loss and PT  Will place referral at this time  A steroid injection was performed at left anterolateral joint using 1% plain Lidocaine and 80 mg of Kenalog  This was well tolerated  Patient was informed of expected pain relief within 4 to 6 hours secondary to anesthetic  They were advised that they may experience a temporary increase in pain within the next 6 to 24 hours as the anesthetic wears off  Patient was advised to apply ice and take Tylenol 1000 mg Q6h for pain  Patient was informed that corticosteroid injection will likely take effect within 24 to 48 hours at which point the pain should significantly improve  Patient informed that it can take up to 7-10 days to feel full extent of improvement  Patient was advised that if their knee becomes hot, red, or swollen that they should return to the clinic as soon as possible or go to local ED for evaluation  Orders:  -     Ambulatory Referral to Physical Therapy; Future         Subjective     This is a very pleasant 67 y o  female who presents to the clinic for management of their chronic medical conditions  Patient's medical conditions are stable unless noted otherwise above  Patient has not had any recent hospitalizations, or medical emergencies since last visit  Patient has no further complaints other than what is mentioned in the ROS  Review of Systems   Constitutional: Negative for chills and fever  HENT: Negative for congestion, ear pain, rhinorrhea and sinus pain  Eyes: Negative for visual disturbance  Respiratory: Negative for chest tightness, shortness of breath and wheezing  Cardiovascular: Negative for chest pain and palpitations  Gastrointestinal: Negative for abdominal pain, constipation, diarrhea and vomiting  Endocrine: Negative for polyuria  Genitourinary: Negative for dysuria  Musculoskeletal: Positive for arthralgias (chronic knee pain)  Negative for myalgias  Neurological: Negative for dizziness, syncope and light-headedness  Psychiatric/Behavioral: Negative for hallucinations, self-injury and suicidal ideas  Past Medical History:   Diagnosis Date   • Echocardiogram abnormal 02/01/2016    Mild LVH with normal systolic function EF > 43%  Grade I Diastolic dysfunction  Aortic sclerosis w/o significant stenosis  Mild insufficiency  Mild MR  Mild TR with normal pulmonary artery pressure  • History of mammogram 09/13/2017    Benign   • History of mammogram 02/15/2016    Benign   • HTN (hypertension)    • Osteopenia determined by x-ray 02/03/2016    osteopenia of the left femoral neck and normal bone marrow density of the left hip total  Osteopenia of the lumbar spine  Past Surgical History:   Procedure Laterality Date   • BLADDER SUSPENSION     • HYSTERECTOMY      age 39     Family History   Problem Relation Age of Onset   • No Known Problems Mother    • No Known Problems Father    • No Known Problems Sister    • No Known Problems Daughter    • No Known Problems Maternal Grandmother    • No Known Problems Maternal Grandfather    • No Known Problems Paternal Grandmother    • No Known Problems Paternal Grandfather    • No Known Problems Maternal Aunt    • No Known Problems Paternal Aunt    • No Known Problems Paternal Aunt    • No Known Problems Paternal Aunt    • No Known Problems Paternal Aunt    • No Known Problems Paternal Aunt      Social History     Socioeconomic History   • Marital status:       Spouse name: None   • Number of children: None   • Years of education: None   • Highest education level: None   Occupational History   • None   Tobacco Use   • Smoking status: Never   • Smokeless tobacco: Never   Substance and Sexual Activity   • Alcohol use: Yes     Alcohol/week: 1 0 standard drink of alcohol     Types: 1 Glasses of wine per week   • Drug use: Never   • Sexual activity: None   Other Topics Concern   • None   Social History Narrative   • None     Social Determinants of Health     Financial Resource Strain: Low Risk  (7/14/2022)    Overall Financial Resource Strain (CARDIA)    • Difficulty of Paying Living Expenses: Not hard at all   Food Insecurity: No Food Insecurity (7/14/2022)    Hunger Vital Sign    • Worried About Running Out of Food in the Last Year: Never true    • Ran Out of Food in the Last Year: Never true   Transportation Needs: No Transportation Needs (7/14/2022)    PRAPARE - Transportation    • Lack of Transportation (Medical): No    • Lack of Transportation (Non-Medical): No   Physical Activity: Not on file   Stress: Not on file   Social Connections: Not on file   Intimate Partner Violence: Not on file   Housing Stability: Not on file     Current Outpatient Medications on File Prior to Visit   Medication Sig   • acetaminophen (TYLENOL) 500 mg tablet Take 2 tablets (1,000 mg total) by mouth every 8 (eight) hours as needed for mild pain or moderate pain   • alendronate (FOSAMAX) 70 mg tablet TAKE ONE TABLET BY MOUTH ONCE WEEKLY   • amLODIPine (NORVASC) 10 mg tablet Take 1 tablet (10 mg total) by mouth daily   • atorvastatin (LIPITOR) 40 mg tablet Take 1 tablet (40 mg total) by mouth daily   • benzonatate (TESSALON) 200 MG capsule Take 1 capsule (200 mg total) by mouth 3 (three) times a day as needed for cough (Patient not taking: Reported on 8/24/2022)   • Blood Pressure KIT Take BP reading daily and as needed   (Patient not taking: Reported on 8/24/2022)   • Calcium Carbonate-Vitamin D 600-400 MG-UNIT per tablet Take 1 tablet by mouth daily   • "cholecalciferol (VITAMIN D3) 1,000 units tablet Take 2 tablets (2,000 Units total) by mouth daily   • Diclofenac Sodium (VOLTAREN) 1 % Apply 2 g topically 4 (four) times a day   • fluticasone (FLONASE) 50 mcg/act nasal spray 1 spray into each nostril daily   • lidocaine (Lidoderm) 5 % Apply 1 patch topically over 12 hours daily Remove & Discard patch within 12 hours or as directed by MD   • loratadine (CLARITIN) 10 mg tablet Take 1 tablet (10 mg total) by mouth daily   • losartan (COZAAR) 100 MG tablet Take 1 tablet (100 mg total) by mouth daily   • naproxen (Naprosyn) 500 mg tablet Take 1 tablet (500 mg total) by mouth 2 (two) times a day with meals     Allergies   Allergen Reactions   • Aspirin    • Penicillins Other (See Comments) and Rash     Immunization History   Administered Date(s) Administered   • COVID-19 MODERNA VACC 0 5 ML IM 01/31/2021, 02/28/2021   • INFLUENZA 01/25/2016, 12/16/2016, 01/26/2018, 04/13/2022   • Influenza, high dose seasonal 0 7 mL 01/28/2019, 11/09/2020, 04/13/2022, 10/20/2022   • Pneumococcal Conjugate 13-Valent 04/12/2017   • Pneumococcal Polysaccharide PPV23 01/26/2018   • Tdap 04/12/2017       Objective     /80 (BP Location: Left arm, Patient Position: Sitting, Cuff Size: Standard)   Pulse 61   Temp 97 5 °F (36 4 °C) (Temporal)   Resp 18   Ht 5' 4\" (1 626 m)   Wt 77 4 kg (170 lb 9 6 oz)   SpO2 98%   BMI 29 28 kg/m²     Physical Exam  Constitutional:       Appearance: Normal appearance  HENT:      Head: Normocephalic and atraumatic  Nose: Nose normal    Eyes:      Conjunctiva/sclera: Conjunctivae normal    Cardiovascular:      Rate and Rhythm: Normal rate  Pulmonary:      Effort: Pulmonary effort is normal    Musculoskeletal:         General: Tenderness (medial and lateral joint line bilateral knees) present  Normal range of motion  Cervical back: Normal range of motion  Skin:     General: Skin is warm and dry     Neurological:      Mental Status: She is " alert and oriented to person, place, and time  Psychiatric:         Behavior: Behavior normal           Large joint arthrocentesis: L knee  Universal Protocol:  Procedure performed by:  Consent: Verbal consent obtained  Risks and benefits: risks, benefits and alternatives were discussed  Consent given by: patient  Timeout called at: 6/8/2023 9:42 AM   Patient understanding: patient states understanding of the procedure being performed  Patient consent: the patient's understanding of the procedure matches consent given  Procedure consent: procedure consent matches procedure scheduled  Site marked: the operative site was marked  Radiology Images displayed and confirmed   If images not available, report reviewed: imaging studies available  Patient identity confirmed: verbally with patient    Supporting Documentation  Indications: pain   Procedure Details  Location: knee - L knee  Needle size: 25 G  Ultrasound guidance: no  Approach: anterolateral  Medications administered: 3 mL lidocaine 1 %; 40 mg triamcinolone acetonide 40 mg/mL    Aspirate: clear    Patient tolerance: patient tolerated the procedure well with no immediate complications  Dressing:  Sterile dressing applied          Carolyn Cruz MD

## 2023-06-17 DIAGNOSIS — M85.80 OSTEOPENIA DETERMINED BY X-RAY: ICD-10-CM

## 2023-06-17 DIAGNOSIS — M17.11 PRIMARY OSTEOARTHRITIS OF RIGHT KNEE: Primary | ICD-10-CM

## 2023-06-19 RX ORDER — CALCIUM CARBONATE/VITAMIN D3 600 MG-10
TABLET ORAL
Qty: 90 TABLET | Refills: 0 | Status: SHIPPED | OUTPATIENT
Start: 2023-06-19

## 2023-06-21 ENCOUNTER — EVALUATION (OUTPATIENT)
Dept: PHYSICAL THERAPY | Facility: CLINIC | Age: 73
End: 2023-06-21
Payer: COMMERCIAL

## 2023-06-21 DIAGNOSIS — M17.12 PRIMARY OSTEOARTHRITIS OF LEFT KNEE: Primary | ICD-10-CM

## 2023-06-21 PROCEDURE — 97161 PT EVAL LOW COMPLEX 20 MIN: CPT

## 2023-06-21 PROCEDURE — 97530 THERAPEUTIC ACTIVITIES: CPT

## 2023-06-21 NOTE — PROGRESS NOTES
PT Evaluation     Today's date: 2023  Patient name: Hood Medrano  : 1950  MRN: [id number]  Referring provider: Marco Smith MD  Dx:   Encounter Diagnosis     ICD-10-CM    1  Primary osteoarthritis of left knee  M17 12 Ambulatory Referral to Physical Therapy          Start Time:   Stop Time: 918  Total time in clinic (min): 41 minutes    Assessment  Assessment details: Pt is a 67 yof presenting to therapy for L knee pain, signs and symptoms consistent with L knee OA  Pt displays good L knee ROM, but has significant weakness of L knee extensors, L knee flexors, L hip ER, and bilateral hip abduction  Pt also had decreased patellar mobility bilaterally due to pain with increased crepitus of the L knee  Due to weakness of LLE, pt has difficulty walking and standing for long periods of time required for her job  Pt will benefit from continued therapy once a week for 6-8 weeks to improve strength and function  Impairments: abnormal or restricted ROM, activity intolerance, impaired physical strength, lacks appropriate home exercise program and pain with function    Symptom irritability: moderateUnderstanding of Dx/Px/POC: good   Prognosis: good    Goals  Short term goals (3-4 weeks)  1  Pt will display independence with understanding and performance of HEP to allow for carryover of plan of care at home  2  Pt will improve FOTO score from initial evaluation to show improvement in pain and function  3  Pt will increase L knee extension strength to 4/5 to improve knee stability and ambulation  4  Pt will increase L knee flexion strength to 4/5 to improve knee stability and ambulation  5  Pt will increase L hip ER strength to 4/5 to improve knee stability and ambulation  Long term goals (6-8 weeks)  1  Pt will score equal or better than projected score on FOTO to show improvement in pain and function    2  Pt will increase L knee extension strength to 4+/5 to improve knee stability "and ambulation  3  Pt will increase L knee flexion strength to 4+/5 to improve knee stability and ambulation  4  Pt will increase bilateral hip ER strength to 4+/5 to improve knee stability and ambulation  5  Pt will increase L hip abduction strength to 4/5 to improve knee stability and ambulation  Plan  Patient would benefit from: skilled physical therapy  Planned modality interventions: TENS, thermotherapy: hydrocollator packs, traction, ultrasound, cryotherapy and low level laser therapy  Planned therapy interventions: body mechanics training, therapeutic training, therapeutic exercise, therapeutic activities, stretching, strengthening, neuromuscular re-education, patient education, home exercise program, functional ROM exercises, flexibility, manual therapy, [place] taping, joint mobilization and balance  Frequency: 1x week  Duration in weeks: 8  Treatment plan discussed with: patient        Subjective Evaluation    History of Present Illness  Mechanism of injury: Pt presents to therapy for L knee pain due to OA  She has difficulty standing up/standig for long periods of time due to the pain  She reports she has had the pain for \"multiple months and multiple years\"  She denies previous therapy for the L knee but reports she had therapy at this location a few months ago for her R knee  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       Pain  Current pain ratin  At best pain ratin  At worst pain rating: 10 (at night after coming home from work)  Quality: tight  Aggravating factors: standing, walking and stair climbing  Progression: worsening    Social Support    Employment status: working (packing medicine requiring standing for long periods of time)  Patient Goals  Patient goals for therapy: decreased pain and increased strength (standing/walking for longer periods of time with less pain)          Objective     General Comments:      Knee Comments  L Knee " AROM  Flexion: 125  Extension: 2 degrees hyper    R knee AROM  Flexion: 126  Extension: 2 degrees hyper    LE Strength  L Hip flex: 3+/5  R Hip flexion: 3+/5  L Knee extension: 3+/5 with pain  R Knee extension: 4+/5   L Hip Abduction: 3+/5  R Hip Abduction: 3+/5   L Hip ER: 3+/5  R Hip ER: 4/5  L Knee flexion: 3+/5 with pain  R Knee flexion: 4-/5    Palpation:   Mobility: patellar mobility limited in all 4 directions bilaterally by pain, L knee increased crepitus             Precautions: HTN, 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, varicose veins of LEs, OA right knee, popliteal cyst of R knee, osteoporosis, osteopenia, trochanteric bursitis of R hip, primary OA of L knee, hyperlipidemia, obesity, chest pain, shoulder pain, prediabetes      Manuals 6/21            Patellar mob nv                                                   Neuro Re-Ed 6/21            SLR (HEP) nv            SL abduction (HEP) nv            Bridges (HEP) nv            SL clams (HEP) nv            SLS nv                                      Ther Ex 6/21            LP nv            bike nv            HS curls nv            Lateral walks nv                                                                Ther Activity 6/21            Pt edu NS            steps nv            Gait Training 6/21                                      Modalities 6/21

## 2023-06-30 ENCOUNTER — APPOINTMENT (OUTPATIENT)
Dept: PHYSICAL THERAPY | Facility: CLINIC | Age: 73
End: 2023-06-30
Payer: COMMERCIAL

## 2023-08-11 ENCOUNTER — OFFICE VISIT (OUTPATIENT)
Dept: DENTISTRY | Facility: CLINIC | Age: 73
End: 2023-08-11

## 2023-08-11 VITALS — TEMPERATURE: 97.7 F | HEART RATE: 65 BPM | DIASTOLIC BLOOD PRESSURE: 75 MMHG | SYSTOLIC BLOOD PRESSURE: 124 MMHG

## 2023-08-11 DIAGNOSIS — Z01.20 ENCOUNTER FOR DENTAL EXAMINATION: Primary | ICD-10-CM

## 2023-08-11 NOTE — DENTAL PROCEDURE DETAILS
Shraddha Alcaraz Marko presents for a Comprehensive exam. Verbal consent for treatment given in addition to the forms. Reviewed health history - Patient is ASA II  Consents signed: Yes     Translation used  ( [id number])  Perio: Generalized  stage 2 grade B  Pain Scale: 4  Caries Assessment: Medium  Radiographs: Complete mouth series     Oral Hygiene instruction reviewed and given. Recommended Hygiene recall visits with the Candida. Treatment Plan:  1. Infection control: referred for   2. Periodontal therapy: ScRp  4 quads  3. Caries control: as charted  4. Occlusal evaluation:   occlusal trauma noted        Exam  DR Francisco Elias  Prognosis is fair  monitor mobility lower anterior teeth  Referrals needed: No  Next Visit:   1. barron # 10 under gold jacket  2. Srp R  3. SRP L    4 Post SRP assessment

## 2023-08-14 ENCOUNTER — OFFICE VISIT (OUTPATIENT)
Dept: FAMILY MEDICINE CLINIC | Facility: CLINIC | Age: 73
End: 2023-08-14

## 2023-08-14 VITALS
HEART RATE: 70 BPM | RESPIRATION RATE: 18 BRPM | BODY MASS INDEX: 28.85 KG/M2 | SYSTOLIC BLOOD PRESSURE: 126 MMHG | TEMPERATURE: 97.8 F | WEIGHT: 169 LBS | OXYGEN SATURATION: 97 % | DIASTOLIC BLOOD PRESSURE: 82 MMHG | HEIGHT: 64 IN

## 2023-08-14 DIAGNOSIS — E55.9 VITAMIN D DEFICIENCY: ICD-10-CM

## 2023-08-14 DIAGNOSIS — M81.0 AGE-RELATED OSTEOPOROSIS WITHOUT CURRENT PATHOLOGICAL FRACTURE: ICD-10-CM

## 2023-08-14 DIAGNOSIS — E78.2 MIXED HYPERLIPIDEMIA: ICD-10-CM

## 2023-08-14 DIAGNOSIS — I10 ESSENTIAL HYPERTENSION: ICD-10-CM

## 2023-08-14 PROBLEM — M25.562 CHRONIC PAIN OF BOTH KNEES: Status: RESOLVED | Noted: 2020-06-01 | Resolved: 2023-08-14

## 2023-08-14 PROBLEM — M17.11 PRIMARY OSTEOARTHRITIS OF RIGHT KNEE: Status: RESOLVED | Noted: 2020-08-07 | Resolved: 2023-08-14

## 2023-08-14 PROBLEM — M17.12 PRIMARY OSTEOARTHRITIS OF LEFT KNEE: Status: RESOLVED | Noted: 2023-06-08 | Resolved: 2023-08-14

## 2023-08-14 PROBLEM — M71.21 POPLITEAL CYST, RIGHT: Status: RESOLVED | Noted: 2020-08-07 | Resolved: 2023-08-14

## 2023-08-14 PROBLEM — H91.92 DECREASED HEARING, LEFT: Status: RESOLVED | Noted: 2019-05-02 | Resolved: 2023-08-14

## 2023-08-14 PROBLEM — G89.29 CHRONIC PAIN OF BOTH KNEES: Status: RESOLVED | Noted: 2020-06-01 | Resolved: 2023-08-14

## 2023-08-14 PROBLEM — M25.561 CHRONIC PAIN OF BOTH KNEES: Status: RESOLVED | Noted: 2020-06-01 | Resolved: 2023-08-14

## 2023-08-14 PROBLEM — G89.29 CHRONIC PAIN OF LEFT KNEE: Status: RESOLVED | Noted: 2023-05-26 | Resolved: 2023-08-14

## 2023-08-14 PROBLEM — Z00.00 ENCOUNTER FOR ANNUAL PHYSICAL EXAM: Status: RESOLVED | Noted: 2022-04-13 | Resolved: 2023-08-14

## 2023-08-14 PROBLEM — R07.81 CHEST PAIN, PLEURITIC: Status: RESOLVED | Noted: 2021-01-05 | Resolved: 2023-08-14

## 2023-08-14 PROBLEM — M25.562 CHRONIC PAIN OF LEFT KNEE: Status: RESOLVED | Noted: 2023-05-26 | Resolved: 2023-08-14

## 2023-08-14 PROCEDURE — 3079F DIAST BP 80-89 MM HG: CPT

## 2023-08-14 PROCEDURE — 3074F SYST BP LT 130 MM HG: CPT

## 2023-08-14 PROCEDURE — 99214 OFFICE O/P EST MOD 30 MIN: CPT

## 2023-08-14 RX ORDER — MELATONIN
2000 DAILY
Qty: 180 TABLET | Refills: 3 | Status: SHIPPED | OUTPATIENT
Start: 2023-08-14

## 2023-08-14 RX ORDER — AMLODIPINE BESYLATE 10 MG/1
10 TABLET ORAL DAILY
Qty: 90 TABLET | Refills: 3 | Status: SHIPPED | OUTPATIENT
Start: 2023-08-14

## 2023-08-14 RX ORDER — CALCIUM CARBONATE/VITAMIN D3 600 MG-10
1 TABLET ORAL DAILY
Qty: 90 TABLET | Refills: 0 | Status: SHIPPED | OUTPATIENT
Start: 2023-08-14

## 2023-08-14 RX ORDER — LOSARTAN POTASSIUM 100 MG/1
100 TABLET ORAL DAILY
Qty: 90 TABLET | Refills: 3 | Status: SHIPPED | OUTPATIENT
Start: 2023-08-14

## 2023-08-14 RX ORDER — ALENDRONATE SODIUM 70 MG/1
70 TABLET ORAL WEEKLY
Qty: 12 TABLET | Refills: 0 | Status: SHIPPED | OUTPATIENT
Start: 2023-08-14

## 2023-08-14 RX ORDER — ATORVASTATIN CALCIUM 40 MG/1
40 TABLET, FILM COATED ORAL DAILY
Qty: 90 TABLET | Refills: 1 | Status: SHIPPED | OUTPATIENT
Start: 2023-08-14

## 2023-08-14 NOTE — ASSESSMENT & PLAN NOTE
Lab Results   Component Value Date    CHOLESTEROL 181 10/26/2022    CHOLESTEROL 223 (H) 07/20/2021    CHOLESTEROL 141 06/05/2020     Lab Results   Component Value Date    HDL 44 (L) 10/26/2022    HDL 47 07/20/2021    HDL 59 06/05/2020     Lab Results   Component Value Date    TRIG 132 10/26/2022    TRIG 182 (H) 07/20/2021    TRIG 96 06/05/2020     Lab Results   Component Value Date    NONHDLC 137 10/26/2022    [facility] 96 04/15/2019     Lab Results   Component Value Date    LDLCALC 111 10/26/2022       - Continue Lipitor 40 mg daily

## 2023-08-14 NOTE — PROGRESS NOTES
Name: Jonathan Moore      : 1950      MRN: [id number]  Encounter Provider: MINDY Elizabeth  Encounter Date: 2023   Encounter department: [address] [facility],[address]     1. Essential hypertension  Assessment & Plan:  BP Readings from Last 3 Encounters:   23 126/82   23 124/75   23 138/80     - At goal. Continue amlodipine 10 mg daily & Losartan 100 mg daily. - reviewed eating a low salt diet (such as the DASH diet), limiting alcohol, exercising, and wt loss. Orders:  -     losartan (COZAAR) 100 MG tablet; Take 1 tablet (100 mg total) by mouth daily  -     amLODIPine (NORVASC) 10 mg tablet; Take 1 tablet (10 mg total) by mouth daily    2. Mixed hyperlipidemia  Assessment & Plan:  Lab Results   Component Value Date    CHOLESTEROL 181 10/26/2022    CHOLESTEROL 223 (H) 2021    CHOLESTEROL 141 2020     Lab Results   Component Value Date    HDL 44 (L) 10/26/2022    HDL 47 2021    HDL 59 2020     Lab Results   Component Value Date    TRIG 132 10/26/2022    TRIG 182 (H) 2021    TRIG 96 2020     Lab Results   Component Value Date    NONHDLC 137 10/26/2022    [facility] 96 04/15/2019     Lab Results   Component Value Date    LDLCALC 111 10/26/2022       - Continue Lipitor 40 mg daily    Orders:  -     atorvastatin (LIPITOR) 40 mg tablet; Take 1 tablet (40 mg total) by mouth daily    3. Vitamin D deficiency  -     cholecalciferol (VITAMIN D3) 1,000 units tablet; Take 2 tablets (2,000 Units total) by mouth daily    4. Age-related osteoporosis without current pathological fracture  Assessment & Plan:  - Continue fosamax, calcium/vitD supplementation    Orders:  -     Calcium Carb-Cholecalciferol 600-10 MG-MCG TABS; Take 1 tablet by mouth daily  -     cholecalciferol (VITAMIN D3) 1,000 units tablet; Take 2 tablets (2,000 Units total) by mouth daily  -     alendronate (FOSAMAX) 70 mg tablet;  Take 1 tablet (70 mg total) by mouth once a week         Ulisses Burton is a 67 y.o. female  has a past medical history of Decreased hearing, left, Echocardiogram abnormal, History of mammogram, History of mammogram, HTN (hypertension), Osteopenia determined by x-ray, Primary osteoarthritis of left knee, and Primary osteoarthritis of right knee. has a past surgical history that includes Hysterectomy and Bladder suspension. Patient presents today for follow-up of chronic conditions. Denies any acute complaints. Review of Systems   Constitutional: Negative for chills and fever. HENT: Negative for ear pain and sore throat. Eyes: Negative for pain and visual disturbance. Respiratory: Negative for cough and shortness of breath. Cardiovascular: Negative for chest pain and palpitations. Gastrointestinal: Negative for abdominal pain and vomiting. Genitourinary: Negative for dysuria and hematuria. Musculoskeletal: Negative for arthralgias and back pain. Skin: Negative for color change and rash. Neurological: Negative for seizures and syncope. All other systems reviewed and are negative. Current Outpatient Medications on File Prior to Visit   Medication Sig   • acetaminophen (TYLENOL) 500 mg tablet Take 2 tablets (1,000 mg total) by mouth every 8 (eight) hours as needed for mild pain or moderate pain   • Blood Pressure KIT Take BP reading daily and as needed.  (Patient not taking: Reported on 8/24/2022)   • Diclofenac Sodium (VOLTAREN) 1 % Apply 2 g topically 4 (four) times a day   • lidocaine (Lidoderm) 5 % Apply 1 patch topically over 12 hours daily Remove & Discard patch within 12 hours or as directed by MD   • naproxen (Naprosyn) 500 mg tablet Take 1 tablet (500 mg total) by mouth 2 (two) times a day with meals   • [DISCONTINUED] alendronate (FOSAMAX) 70 mg tablet TAKE ONE TABLET BY MOUTH ONCE WEEKLY   • [DISCONTINUED] amLODIPine (NORVASC) 10 mg tablet Take 1 tablet (10 mg total) by mouth daily   • [DISCONTINUED] atorvastatin (LIPITOR) 40 mg tablet Take 1 tablet (40 mg total) by mouth daily   • [DISCONTINUED] benzonatate (TESSALON) 200 MG capsule Take 1 capsule (200 mg total) by mouth 3 (three) times a day as needed for cough (Patient not taking: Reported on 8/24/2022)   • [DISCONTINUED] Calcium Carb-Cholecalciferol 600-10 MG-MCG TABS TAKE ONE TABLET BY MOUTH EVERY DAY   • [DISCONTINUED] Calcium Carbonate-Vitamin D 600-400 MG-UNIT per tablet Take 1 tablet by mouth daily   • [DISCONTINUED] cholecalciferol (VITAMIN D3) 1,000 units tablet Take 2 tablets (2,000 Units total) by mouth daily   • [DISCONTINUED] fluticasone (FLONASE) 50 mcg/act nasal spray 1 spray into each nostril daily   • [DISCONTINUED] loratadine (CLARITIN) 10 mg tablet Take 1 tablet (10 mg total) by mouth daily   • [DISCONTINUED] losartan (COZAAR) 100 MG tablet Take 1 tablet (100 mg total) by mouth daily       Objective     /82 (BP Location: Right arm, Patient Position: Sitting, Cuff Size: Standard)   Pulse 70   Temp 97.8 °F (36.6 °C) (Temporal)   Resp 18   Ht 5' 4" (1.626 m)   Wt 76.7 kg (169 lb)   SpO2 97%   BMI 29.01 kg/m²     Physical Exam  Vitals and nursing note reviewed. Constitutional:       Appearance: She is overweight. HENT:      Head: Normocephalic and atraumatic. Right Ear: External ear normal.      Left Ear: External ear normal.      Nose: Nose normal.   Eyes:      Extraocular Movements: Extraocular movements intact. Conjunctiva/sclera: Conjunctivae normal.      Pupils: Pupils are equal, round, and reactive to light. Cardiovascular:      Rate and Rhythm: Normal rate and regular rhythm. Pulses: Normal pulses. Heart sounds: Normal heart sounds. Pulmonary:      Effort: Pulmonary effort is normal.      Breath sounds: Normal breath sounds. Abdominal:      General: Bowel sounds are normal.      Palpations: Abdomen is soft. Tenderness:  There is no abdominal tenderness. Musculoskeletal:         General: Normal range of motion. Cervical back: Normal range of motion. Skin:     General: Skin is warm and dry. Neurological:      General: No focal deficit present. Mental Status: She is alert and oriented to person, place, and time. Mental status is at baseline. Psychiatric:         Mood and Affect: Mood normal.         Behavior: Behavior normal.         Thought Content:  Thought content normal.         Judgment: Judgment normal.       Petr Flores

## 2023-08-14 NOTE — ASSESSMENT & PLAN NOTE
BP Readings from Last 3 Encounters:   08/14/23 126/82   08/11/23 124/75   06/08/23 138/80     - At goal. Continue amlodipine 10 mg daily & Losartan 100 mg daily. - reviewed eating a low salt diet (such as the DASH diet), limiting alcohol, exercising, and wt loss.

## 2023-09-14 ENCOUNTER — OFFICE VISIT (OUTPATIENT)
Dept: DENTISTRY | Facility: CLINIC | Age: 73
End: 2023-09-14

## 2023-09-14 VITALS — DIASTOLIC BLOOD PRESSURE: 69 MMHG | TEMPERATURE: 98.7 F | HEART RATE: 76 BPM | SYSTOLIC BLOOD PRESSURE: 145 MMHG

## 2023-09-14 DIAGNOSIS — K02.9 CARIES LESION, APPROXIMAL SURFACE, D2: Primary | ICD-10-CM

## 2023-09-14 PROCEDURE — D2331 RESIN-BASED COMPOSITE - 2 SURFACES, ANTERIOR: HCPCS | Performed by: DENTIST

## 2023-09-14 NOTE — DENTAL PROCEDURE DETAILS
Composite Filling #10 ML    Shraddha Mcclendon presents for composite filling. Patient consent treatment. PMH reviewed, no changes. ASA: II  Pain score: 0  Chief complain: none. DX: tooth #10 with existing gold jacket MIFL and recurrent caries on ML. Informed patient tooth #10 needs full coverge zirconia crown restoration for an optimum treatment. Providing the composite filling is an intermediate phase due to compromised old gold restoration. Patient aware and understood. Discussed with patient need for RCT if pulp exposure occurs or in future if pulp is inflamed. Pt understands and consents. Applied topical benzocaine, administered half one carps 2% lido 1:100k epi via maxillary infiltration injection. Prepped tooth #10 ML with 245 carbide on high speed. Caries removed with round carbide on slow speed. Placed Mylar strip and wedge. Isolation with cotton rolls and dri-angles    Etch with 37% H2PO4, rinse, dry. Applied Adhese with 20 second scrub once, gentle air dry and light cured for 10s. Restored with Tetric bulk dean shade A2 and light cured. Refined with finishing burs, polished with enhance point. Verified occlusion and contacts. POI is given. Pt left satisfied and ambulatory. NV: SRP with hyg.

## 2023-09-21 ENCOUNTER — APPOINTMENT (EMERGENCY)
Dept: RADIOLOGY | Facility: HOSPITAL | Age: 73
End: 2023-09-21
Payer: MEDICARE

## 2023-09-21 ENCOUNTER — HOSPITAL ENCOUNTER (EMERGENCY)
Facility: HOSPITAL | Age: 73
Discharge: HOME | End: 2023-09-21
Attending: EMERGENCY MEDICINE
Payer: MEDICARE

## 2023-09-21 VITALS
SYSTOLIC BLOOD PRESSURE: 140 MMHG | RESPIRATION RATE: 20 BRPM | TEMPERATURE: 97.1 F | HEART RATE: 76 BPM | DIASTOLIC BLOOD PRESSURE: 72 MMHG | OXYGEN SATURATION: 99 % | BODY MASS INDEX: 29.04 KG/M2 | WEIGHT: 169.2 LBS
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
## 2023-09-21 DIAGNOSIS — S43.409A SHOULDER SPRAIN: ICD-10-CM

## 2023-09-21 PROCEDURE — 71101 X-RAY EXAM UNILAT RIBS/CHEST: CPT

## 2023-09-21 PROCEDURE — 73030 X-RAY EXAM OF SHOULDER: CPT

## 2023-09-21 PROCEDURE — 99284 EMERGENCY DEPT VISIT MOD MDM: CPT | Performed by: EMERGENCY MEDICINE

## 2023-09-21 PROCEDURE — 99284 EMERGENCY DEPT VISIT MOD MDM: CPT

## 2023-09-21 RX ORDER — ACETAMINOPHEN 500 MG
500 TABLET ORAL EVERY 4 HOURS PRN
Qty: 30 TABLET | Refills: 0 | Status: SHIPPED | OUTPATIENT
Start: 2023-09-21

## 2023-09-21 RX ORDER — CYCLOBENZAPRINE HCL 10 MG
10 TABLET ORAL ONCE
Status: COMPLETED | OUTPATIENT
Start: 2023-09-21 | End: 2023-09-21

## 2023-09-21 RX ORDER — ACETAMINOPHEN 325 MG/1
650 TABLET ORAL ONCE
Status: COMPLETED | OUTPATIENT
Start: 2023-09-21 | End: 2023-09-21

## 2023-09-21 RX ORDER — CYCLOBENZAPRINE HCL 10 MG
10 TABLET ORAL 2 TIMES DAILY PRN
Qty: 20 TABLET | Refills: 0 | Status: SHIPPED | OUTPATIENT
Start: 2023-09-21

## 2023-09-21 RX ADMIN — ACETAMINOPHEN 650 MG: 325 TABLET ORAL at 10:30

## 2023-09-21 RX ADMIN — CYCLOBENZAPRINE HYDROCHLORIDE 10 MG: 10 TABLET, FILM COATED ORAL at 10:30

## 2023-09-21 NOTE — Clinical Note
Shraddha Sam Varela was seen and treated in our emergency department on 9/21/2023. Diagnosis:     Candida  may return to work on return date. She may return on this date: 09/25/2023         If you have any questions or concerns, please don't hesitate to call.       Alexis Lang MD    ______________________________           _______________          _______________  Hospital Representative                              Date                                Time

## 2023-09-21 NOTE — ED PROVIDER NOTES
History  Chief Complaint   Patient presents with   • Fall     Pt reports fall 2 days ago on right side. Pt c/o right arm and right side pain. Pt reports mechanical fall, no LOC, no head strike, no thinners. 58-year-old female presenting the emergency department with right shoulder and right chest pain after fall 2 days ago. Notes that no loss of consciousness, no head injury. Notes that it was a mechanical fall, tripped. No fever chills cough congestion rhinorrhea. No numbness or tingling of the right upper extremity. Prior to Admission Medications   Prescriptions Last Dose Informant Patient Reported? Taking? Blood Pressure KIT  Self No No   Sig: Take BP reading daily and as needed.    Patient not taking: Reported on 8/24/2022   Calcium Carb-Cholecalciferol 600-10 MG-MCG TABS   No No   Sig: Take 1 tablet by mouth daily   Diclofenac Sodium (VOLTAREN) 1 %   No No   Sig: Apply 2 g topically 4 (four) times a day   acetaminophen (TYLENOL) 500 mg tablet   No No   Sig: Take 2 tablets (1,000 mg total) by mouth every 8 (eight) hours as needed for mild pain or moderate pain   alendronate (FOSAMAX) 70 mg tablet   No No   Sig: Take 1 tablet (70 mg total) by mouth once a week   amLODIPine (NORVASC) 10 mg tablet   No No   Sig: Take 1 tablet (10 mg total) by mouth daily   atorvastatin (LIPITOR) 40 mg tablet   No No   Sig: Take 1 tablet (40 mg total) by mouth daily   cholecalciferol (VITAMIN D3) 1,000 units tablet   No No   Sig: Take 2 tablets (2,000 Units total) by mouth daily   lidocaine (Lidoderm) 5 %   No No   Sig: Apply 1 patch topically over 12 hours daily Remove & Discard patch within 12 hours or as directed by MD   losartan (COZAAR) 100 MG tablet   No No   Sig: Take 1 tablet (100 mg total) by mouth daily   naproxen (Naprosyn) 500 mg tablet   No No   Sig: Take 1 tablet (500 mg total) by mouth 2 (two) times a day with meals      Facility-Administered Medications: None       Past Medical History:   Diagnosis Date   • Decreased hearing, left 5/2/2019   • Echocardiogram abnormal 02/01/2016    Mild LVH with normal systolic function EF > 11%. Grade I Diastolic dysfunction. Aortic sclerosis w/o significant stenosis. Mild insufficiency. Mild MR. Mild TR with normal pulmonary artery pressure. • History of mammogram 09/13/2017    Benign   • History of mammogram 02/15/2016    Benign   • HTN (hypertension)    • Osteopenia determined by x-ray 02/03/2016    osteopenia of the left femoral neck and normal bone marrow density of the left hip total. Osteopenia of the lumbar spine. • Primary osteoarthritis of left knee 6/8/2023   • Primary osteoarthritis of right knee 8/7/2020       Past Surgical History:   Procedure Laterality Date   • BLADDER SUSPENSION     • HYSTERECTOMY      age 39       Family History   Problem Relation Age of Onset   • No Known Problems Mother    • No Known Problems Father    • No Known Problems Sister    • No Known Problems Daughter    • No Known Problems Maternal Grandmother    • No Known Problems Maternal Grandfather    • No Known Problems Paternal Grandmother    • No Known Problems Paternal Grandfather    • No Known Problems Maternal Aunt    • No Known Problems Paternal Aunt    • No Known Problems Paternal Aunt    • No Known Problems Paternal Aunt    • No Known Problems Paternal Aunt    • No Known Problems Paternal Aunt      I have reviewed and agree with the history as documented. E-Cigarette/Vaping     E-Cigarette/Vaping Substances     Social History     Tobacco Use   • Smoking status: Never     Passive exposure: Never   • Smokeless tobacco: Never   Substance Use Topics   • Alcohol use: Yes     Alcohol/week: 1.0 standard drink of alcohol     Types: 1 Glasses of wine per week   • Drug use: Never       Review of Systems   Constitutional: Negative for chills and fever. HENT: Negative for ear pain and sore throat. Eyes: Negative for pain and visual disturbance.    Respiratory: Negative for cough and shortness of breath. Cardiovascular: Positive for chest pain. Negative for palpitations. Gastrointestinal: Negative for abdominal pain and vomiting. Genitourinary: Negative for dysuria and hematuria. Musculoskeletal: Positive for arthralgias. Negative for back pain. Skin: Negative for color change and rash. Neurological: Negative for seizures and syncope. All other systems reviewed and are negative. Physical Exam  Physical Exam  Vitals and nursing note reviewed. Constitutional:       General: She is not in acute distress. Appearance: She is well-developed. HENT:      Head: Normocephalic and atraumatic. Eyes:      Conjunctiva/sclera: Conjunctivae normal.   Cardiovascular:      Rate and Rhythm: Normal rate and regular rhythm. Heart sounds: No murmur heard. Pulmonary:      Effort: Pulmonary effort is normal. No respiratory distress. Breath sounds: Normal breath sounds. Abdominal:      Palpations: Abdomen is soft. Tenderness: There is no abdominal tenderness. Musculoskeletal:         General: No swelling. Cervical back: Neck supple. Comments: Right shoulder ttp but full rom. Right lateral chest wall ttp   Skin:     General: Skin is warm and dry. Capillary Refill: Capillary refill takes less than 2 seconds. Neurological:      Mental Status: She is alert and oriented to person, place, and time.    Psychiatric:         Mood and Affect: Mood normal.         Vital Signs  ED Triage Vitals [09/21/23 1008]   Temperature Pulse Respirations Blood Pressure SpO2   (!) 97.1 °F (36.2 °C) 76 20 140/72 99 %      Temp Source Heart Rate Source Patient Position - Orthostatic VS BP Location FiO2 (%)   Tympanic Monitor Lying Left arm --      Pain Score       8           Vitals:    09/21/23 1008   BP: 140/72   Pulse: 76   Patient Position - Orthostatic VS: Lying         Visual Acuity  Visual Acuity    Flowsheet Row Most Recent Value   L Pupil Size (mm) 3   R Pupil Size (mm) 3 ED Medications  Medications   cyclobenzaprine (FLEXERIL) tablet 10 mg (10 mg Oral Given 9/21/23 1030)   acetaminophen (TYLENOL) tablet 650 mg (650 mg Oral Given 9/21/23 1030)       Diagnostic Studies  Results Reviewed     None                 XR ribs with pa chest min 3 views RIGHT    (Results Pending)   XR shoulder 2+ views RIGHT    (Results Pending)              Procedures  Procedures         ED Course                               SBIRT 20yo+    Flowsheet Row Most Recent Value   Initial Alcohol Screen: US AUDIT-C     1. How often do you have a drink containing alcohol? 0 Filed at: 09/21/2023 1013   2. How many drinks containing alcohol do you have on a typical day you are drinking? 0 Filed at: 09/21/2023 1013   3a. Male UNDER 65: How often do you have five or more drinks on one occasion? 0 Filed at: 09/21/2023 1013   3b. FEMALE Any Age, or MALE 65+: How often do you have 4 or more drinks on one occassion? 0 Filed at: 09/21/2023 1013   Audit-C Score 0 Filed at: 09/21/2023 1013   ALEXYS: How many times in the past year have you. .. Used an illegal drug or used a prescription medication for non-medical reasons? Never Filed at: 09/21/2023 1013                    Medical Decision Making  66 yo F with right shoulder pain and right chest wall pain. ddx includes shoulder dislocation, fracture, sprain. XR of right shoulder w/o fracture or dislocation on my interpretation. XR of ribs also without fracture or pneumothorax on my interpretation. Placed in sling foru shoulder for 2 days. pcp follow up    Amount and/or Complexity of Data Reviewed  Radiology: ordered. Risk  OTC drugs. Prescription drug management. Disposition  Final diagnoses:   Fall, initial encounter   Shoulder sprain     Time reflects when diagnosis was documented in both MDM as applicable and the Disposition within this note     Time User Action Codes Description Comment    9/21/2023 11:24 AM Anam Monzon Add [L12. XXXA] Fall, initial encounter     9/21/2023 11:24 AM Rosa Isela De La Rosa [B08.817O] Shoulder sprain       ED Disposition     ED Disposition   Discharge    Condition   Stable    Date/Time   Thu Sep 21, 2023 1124    Comment   Shraddha Sal Ortega discharge to home/self care. Follow-up Information     Follow up With Specialties Details Why Contact Info    MINDY Rogers Nurse Practitioner, Family Medicine   [address] [place] [id number] [phone]            Patient's Medications   Discharge Prescriptions    ACETAMINOPHEN (TYLENOL) 500 MG TABLET    Take 1 tablet (500 mg total) by mouth every 4 (four) hours as needed for mild pain, headaches or fever       Start Date: 9/21/2023 End Date: --       Order Dose: 500 mg       Quantity: 30 tablet    Refills: 0    CYCLOBENZAPRINE (FLEXERIL) 10 MG TABLET    Take 1 tablet (10 mg total) by mouth 2 (two) times a day as needed for muscle spasms       Start Date: 9/21/2023 End Date: --       Order Dose: 10 mg       Quantity: 20 tablet    Refills: 0       No discharge procedures on file.     PDMP Review     None          ED Provider  Electronically Signed by           Yan Jiang MD  09/21/23 [id number]

## 2023-11-07 ENCOUNTER — TELEPHONE (OUTPATIENT)
Dept: FAMILY MEDICINE CLINIC | Facility: CLINIC | Age: 73
End: 2023-11-07

## 2023-11-07 DIAGNOSIS — I10 ESSENTIAL HYPERTENSION: ICD-10-CM

## 2023-11-07 DIAGNOSIS — E66.3 OVERWEIGHT (BMI 25.0-29.9): Primary | ICD-10-CM

## 2023-11-07 NOTE — TELEPHONE ENCOUNTER
11/07/23    Hi Miss. Dorothea Mauricio came into the office today asking if you can place the order for her BW. Pt stated that at her last visit 08/14/23 you advised her to get her BW Done before her 11/14/23 appt with you. Pt does not have any BW Orders in. Any questions, please contact Pt.

## 2023-11-13 NOTE — PROGRESS NOTES
Assessment and Plan:     Problem List Items Addressed This Visit       Essential hypertension     BP Readings from Last 3 Encounters:   11/14/23 138/88   09/21/23 140/72   09/14/23 145/69     - At goal. Continue amlodipine 10 mg daily & Losartan 100 mg daily. - reviewed eating a low salt diet (such as the DASH diet), limiting alcohol, exercising, and wt loss. Hyperlipidemia     Lab Results   Component Value Date    CHOLESTEROL 181 10/26/2022    CHOLESTEROL 223 (H) 07/20/2021    CHOLESTEROL 141 06/05/2020     Lab Results   Component Value Date    HDL 44 (L) 10/26/2022    HDL 47 07/20/2021    HDL 59 06/05/2020     Lab Results   Component Value Date    TRIG 132 10/26/2022    TRIG 182 (H) 07/20/2021    TRIG 96 06/05/2020     Lab Results   Component Value Date    [address] 137 10/26/2022    [address] 96 04/15/2019     Lab Results   Component Value Date    LDLCALC 111 10/26/2022   Check lipid panel. Continue Lipitor 40 mg daily          Other Visit Diagnoses       Medicare annual wellness visit, subsequent    -  Primary    Encounter for immunization        Relevant Orders    influenza vaccine, high-dose, PF 0.7 mL (FLUZONE HIGH-DOSE) (Completed)          BMI Counseling: Body mass index is 29.52 kg/m². The BMI is above normal. Nutrition recommendations include decreasing portion sizes, encouraging healthy choices of fruits and vegetables, decreasing fast food intake, consuming healthier snacks, limiting drinks that contain sugar, moderation in carbohydrate intake, increasing intake of lean protein, reducing intake of saturated and trans fat and reducing intake of cholesterol. Exercise recommendations include moderate physical activity 150 minutes/week. No pharmacotherapy was ordered. Rationale for BMI follow-up plan is due to patient being overweight or obese. Depression Screening and Follow-up Plan: Patient was screened for depression during today's encounter.  They screened negative with a PHQ-2 score of 1.      Preventive health issues were discussed with patient, and age appropriate screening tests were ordered as noted in patient's After Visit Summary. Personalized health advice and appropriate referrals for health education or preventive services given if needed, as noted in patient's After Visit Summary. History of Present Illness:     Patient presents for a Medicare Wellness Visit    Varun Sher is a 68 y.o. female  has a past medical history of Decreased hearing, left, Echocardiogram abnormal, History of mammogram, History of mammogram, HTN (hypertension), Osteopenia determined by x-ray, Primary osteoarthritis of left knee, and Primary osteoarthritis of right knee. has a past surgical history that includes Hysterectomy and Bladder suspension. She presents today for an AWV. Patient Care Team:  Jessica Mora as PCP - General (Nurse Practitioner)  Abdelrahman Knox MD as PCP - [address] [place] [address] (RTE)  Abdelrahman Knox MD as PCP - [facility] ([facility]E)  Evangelina Sinclair MD as Resident (Family Medicine)     Review of Systems:     Review of Systems   Constitutional:  Negative for chills and fever. HENT:  Negative for ear pain and sore throat. Eyes:  Negative for pain and visual disturbance. Respiratory:  Negative for cough and shortness of breath. Cardiovascular:  Negative for chest pain and palpitations. Gastrointestinal:  Negative for abdominal pain and vomiting. Genitourinary:  Negative for dysuria and hematuria. Musculoskeletal:  Negative for arthralgias and back pain. Skin:  Negative for color change and rash. Neurological:  Negative for seizures and syncope. All other systems reviewed and are negative. Problem List:     Patient Active Problem List   Diagnosis    Essential hypertension    Hyperlipidemia    Overweight (BMI 25.0-29. 9)    Varicose veins of both lower extremities    Swelling of both lower extremities    Right hip pain    Shoulder pain Age-related osteoporosis without current pathological fracture    Chronic cough    Trochanteric bursitis of right hip    Prediabetes      Past Medical and Surgical History:     Past Medical History:   Diagnosis Date    Decreased hearing, left 5/2/2019    Echocardiogram abnormal 02/01/2016    Mild LVH with normal systolic function EF > 07%. Grade I Diastolic dysfunction. Aortic sclerosis w/o significant stenosis. Mild insufficiency. Mild MR. Mild TR with normal pulmonary artery pressure. History of mammogram 09/13/2017    Benign    History of mammogram 02/15/2016    Benign    HTN (hypertension)     Osteopenia determined by x-ray 02/03/2016    osteopenia of the left femoral neck and normal bone marrow density of the left hip total. Osteopenia of the lumbar spine. Primary osteoarthritis of left knee 6/8/2023    Primary osteoarthritis of right knee 8/7/2020     Past Surgical History:   Procedure Laterality Date    BLADDER SUSPENSION      HYSTERECTOMY      age 39      Family History:     Family History   Problem Relation Age of Onset    No Known Problems Mother     No Known Problems Father     No Known Problems Sister     No Known Problems Daughter     No Known Problems Maternal Grandmother     No Known Problems Maternal Grandfather     No Known Problems Paternal Grandmother     No Known Problems Paternal Grandfather     No Known Problems Maternal Aunt     No Known Problems Paternal Aunt     No Known Problems Paternal Aunt     No Known Problems Paternal Aunt     No Known Problems Paternal Aunt     No Known Problems Paternal Aunt       Social History:     Social History     Socioeconomic History    Marital status:      Spouse name: None    Number of children: None    Years of education: None    Highest education level: None   Occupational History    None   Tobacco Use    Smoking status: Never     Passive exposure: Never    Smokeless tobacco: Never   Substance and Sexual Activity    Alcohol use:  Yes Alcohol/week: 1.0 standard drink of alcohol     Types: 1 Glasses of wine per week    Drug use: Never    Sexual activity: None   Other Topics Concern    None   Social History Narrative    None     Social Determinants of Health     Financial Resource Strain: Low Risk  (8/14/2023)    Overall Financial Resource Strain (CARDIA)     Difficulty of Paying Living Expenses: Not hard at all   Food Insecurity: No Food Insecurity (8/14/2023)    Hunger Vital Sign     Worried About Running Out of Food in the Last Year: Never true     Ran Out of Food in the Last Year: Never true   Transportation Needs: No Transportation Needs (8/14/2023)    PRAPARE - Transportation     Lack of Transportation (Medical): No     Lack of Transportation (Non-Medical):  No   Physical Activity: Not on file   Stress: Not on file   Social Connections: Not on file   Intimate Partner Violence: Not on file   Housing Stability: Not on file      Medications and Allergies:     Current Outpatient Medications   Medication Sig Dispense Refill    Ketotifen Fumarate (ZADITOR) 0.035 % ophthalmic solution Apply 1 drop to eye 2 (two) times a day      acetaminophen (TYLENOL) 500 mg tablet Take 1 tablet (500 mg total) by mouth every 4 (four) hours as needed for mild pain, headaches or fever 30 tablet 0    alendronate (FOSAMAX) 70 mg tablet Take 1 tablet (70 mg total) by mouth once a week 12 tablet 0    amLODIPine (NORVASC) 10 mg tablet Take 1 tablet (10 mg total) by mouth daily 90 tablet 3    atorvastatin (LIPITOR) 40 mg tablet Take 1 tablet (40 mg total) by mouth daily 90 tablet 1    Calcium Carb-Cholecalciferol 600-10 MG-MCG TABS Take 1 tablet by mouth daily 90 tablet 0    cholecalciferol (VITAMIN D3) 1,000 units tablet Take 2 tablets (2,000 Units total) by mouth daily 180 tablet 3    cyclobenzaprine (FLEXERIL) 10 mg tablet Take 1 tablet (10 mg total) by mouth 2 (two) times a day as needed for muscle spasms 20 tablet 0    Diclofenac Sodium (VOLTAREN) 1 % Apply 2 g topically 4 (four) times a day 100 g 1    lidocaine (Lidoderm) 5 % Apply 1 patch topically over 12 hours daily Remove & Discard patch within 12 hours or as directed by MD Son patch 0    losartan (COZAAR) 100 MG tablet Take 1 tablet (100 mg total) by mouth daily 90 tablet 3    naproxen (Naprosyn) 500 mg tablet Take 1 tablet (500 mg total) by mouth 2 (two) times a day with meals 30 tablet 0     No current facility-administered medications for this visit. Allergies   Allergen Reactions    Aspirin     Penicillins Other (See Comments) and Rash      Immunizations:     Immunization History   Administered Date(s) Administered    COVID-19 MODERNA VACC 0.5 ML IM 01/31/2021, 02/28/2021    INFLUENZA 01/25/2016, 12/16/2016, 01/26/2018, 04/13/2022, 10/20/2022    Influenza, high dose seasonal 0.7 mL 01/28/2019, 11/09/2020, 04/13/2022, 10/20/2022, 11/14/2023    Pneumococcal Conjugate 13-Valent 04/12/2017    Pneumococcal Polysaccharide PPV23 01/26/2018    Tdap 04/12/2017      Health Maintenance:         Topic Date Due    Colorectal Cancer Screening  09/18/2032    Hepatitis C Screening  Completed         Topic Date Due    COVID-19 Vaccine (3 - Philippe Lav series) 04/25/2021      Medicare Screening Tests and Risk Assessments:         Health Risk Assessment:   Patient rates overall health as good. Patient feels that their physical health rating is same. Patient is satisfied with their life. Eyesight was rated as same. Hearing was rated as same. Patient feels that their emotional and mental health rating is same. Patients states they are never, rarely angry. Patient states they are never, rarely unusually tired/fatigued. Pain experienced in the last 7 days has been some. Patient's pain rating has been 1/10. Patient states that she has experienced no weight loss or gain in last 6 months. Depression Screening:   PHQ-2 Score: 1      Fall Risk Screening:    In the past year, patient has experienced: history of falling in past year Number of falls: 1  Injured during fall?: No    Feels unsteady when standing or walking?: Yes    Worried about falling?: No      Urinary Incontinence Screening:   Patient has not leaked urine accidently in the last six months. Home Safety:  Patient does not have trouble with stairs inside or outside of their home. Patient has working smoke alarms and has working carbon monoxide detector. Home safety hazards include: none. Nutrition:   Current diet is Regular, No Added Salt and Low Cholesterol. Medications:   Patient is not currently taking any over-the-counter supplements. Patient is able to manage medications. Activities of Daily Living (ADLs)/Instrumental Activities of Daily Living (IADLs):   Walk and transfer into and out of bed and chair?: Yes  Dress and groom yourself?: Yes    Bathe or shower yourself?: Yes    Feed yourself? Yes  Do your laundry/housekeeping?: Yes  Manage your money, pay your bills and track your expenses?: Yes  Make your own meals?: Yes    Do your own shopping?: Yes    Previous Hospitalizations:   Any hospitalizations or ED visits within the last 12 months?: Yes      Hospitalization Comments: Fall & arm pain.  Declined PT    Advance Care Planning:   Living will: No    Durable POA for healthcare: No    Advanced directive: No    Advanced directive counseling given: Yes    ACP document given: Yes    Patient declined ACP directive: No      Cognitive Screening:   Provider or family/friend/caregiver concerned regarding cognition?: No    PREVENTIVE SCREENINGS      Cardiovascular Screening:    General: Screening Not Indicated and History Lipid Disorder      Diabetes Screening:     General: Screening Current      Colorectal Cancer Screening:     General: Screening Current      Breast Cancer Screening:     General: Screening Current      Cervical Cancer Screening:    General: Screening Not Indicated      Osteoporosis Screening:    General: Screening Not Indicated and History Osteoporosis      Lung Cancer Screening:     General: Screening Not Indicated      Hepatitis C Screening:    General: Screening Current    Screening, Brief Intervention, and Referral to Treatment (SBIRT)    Screening  Typical number of drinks in a day: 0  Typical number of drinks in a week: 1  Interpretation: Low risk drinking behavior. Single Item Drug Screening:  How often have you used an illegal drug (including marijuana) or a prescription medication for non-medical reasons in the past year? never    Single Item Drug Screen Score: 0  Interpretation: Negative screen for possible drug use disorder    Brief Intervention  Alcohol & drug use screenings were reviewed. No concerns regarding substance use disorder identified. No results found. Physical Exam:     /88 (BP Location: Left arm, Patient Position: Sitting, Cuff Size: Standard)   Pulse 77   Temp 97.8 °F (36.6 °C) (Temporal)   Resp 18   Ht 5' 4" (1.626 m)   Wt 78 kg (172 lb)   SpO2 98%   BMI 29.52 kg/m²     Physical Exam  Vitals and nursing note reviewed. Constitutional:       General: She is not in acute distress. Appearance: Normal appearance. She is well-developed and overweight. HENT:      Head: Normocephalic and atraumatic. Right Ear: External ear normal.      Left Ear: External ear normal.      Nose: Nose normal.   Eyes:      Conjunctiva/sclera: Conjunctivae normal.   Cardiovascular:      Rate and Rhythm: Normal rate and regular rhythm. Pulses: Normal pulses. Heart sounds: Normal heart sounds. No murmur heard. Pulmonary:      Effort: Pulmonary effort is normal. No respiratory distress. Breath sounds: Normal breath sounds. Abdominal:      General: Bowel sounds are normal.      Palpations: Abdomen is soft. Tenderness: There is no abdominal tenderness. Musculoskeletal:         General: No swelling. Normal range of motion. Cervical back: Normal range of motion and neck supple.    Skin: General: Skin is warm and dry. Capillary Refill: Capillary refill takes less than 2 seconds. Neurological:      General: No focal deficit present. Mental Status: She is alert and oriented to person, place, and time. Mental status is at baseline. Psychiatric:         Mood and Affect: Mood normal.         Behavior: Behavior normal.         Thought Content:  Thought content normal.         Judgment: Judgment normal.          Eduard Lu

## 2023-11-14 ENCOUNTER — APPOINTMENT (OUTPATIENT)
Dept: LAB | Facility: HOSPITAL | Age: 73
End: 2023-11-14
Payer: MEDICARE

## 2023-11-14 ENCOUNTER — OFFICE VISIT (OUTPATIENT)
Dept: FAMILY MEDICINE CLINIC | Facility: CLINIC | Age: 73
End: 2023-11-14

## 2023-11-14 VITALS
HEART RATE: 77 BPM | TEMPERATURE: 97.8 F | HEIGHT: 64 IN | BODY MASS INDEX: 29.37 KG/M2 | SYSTOLIC BLOOD PRESSURE: 138 MMHG | OXYGEN SATURATION: 98 % | RESPIRATION RATE: 18 BRPM | DIASTOLIC BLOOD PRESSURE: 88 MMHG | WEIGHT: 172 LBS

## 2023-11-14 DIAGNOSIS — I10 ESSENTIAL HYPERTENSION: ICD-10-CM

## 2023-11-14 DIAGNOSIS — Z23 ENCOUNTER FOR IMMUNIZATION: ICD-10-CM

## 2023-11-14 DIAGNOSIS — E66.3 OVERWEIGHT (BMI 25.0-29.9): ICD-10-CM

## 2023-11-14 DIAGNOSIS — E78.2 MIXED HYPERLIPIDEMIA: ICD-10-CM

## 2023-11-14 DIAGNOSIS — Z00.00 MEDICARE ANNUAL WELLNESS VISIT, SUBSEQUENT: Primary | ICD-10-CM

## 2023-11-14 LAB
ANION GAP SERPL CALCULATED.3IONS-SCNC: 6 MMOL/L
BUN SERPL-MCNC: 12 MG/DL (ref 5–25)
CALCIUM SERPL-MCNC: 9.4 MG/DL (ref 8.4–10.2)
CHLORIDE SERPL-SCNC: 104 MMOL/L (ref 96–108)
CHOLEST SERPL-MCNC: 145 MG/DL
CO2 SERPL-SCNC: 30 MMOL/L (ref 21–32)
CREAT SERPL-MCNC: 0.56 MG/DL (ref 0.6–1.3)
CREAT UR-MCNC: 87.7 MG/DL
EST. AVERAGE GLUCOSE BLD GHB EST-MCNC: 123 MG/DL
GFR SERPL CREATININE-BSD FRML MDRD: 92 ML/MIN/1.73SQ M
GLUCOSE P FAST SERPL-MCNC: 99 MG/DL (ref 65–99)
HBA1C MFR BLD: 5.9 %
HDLC SERPL-MCNC: 56 MG/DL
LDLC SERPL CALC-MCNC: 71 MG/DL (ref 0–100)
MICROALBUMIN UR-MCNC: 10.3 MG/L
MICROALBUMIN/CREAT 24H UR: 12 MG/G CREATININE (ref 0–30)
POTASSIUM SERPL-SCNC: 4.2 MMOL/L (ref 3.5–5.3)
SODIUM SERPL-SCNC: 140 MMOL/L (ref 135–147)
TRIGL SERPL-MCNC: 88 MG/DL

## 2023-11-14 PROCEDURE — 36415 COLL VENOUS BLD VENIPUNCTURE: CPT

## 2023-11-14 PROCEDURE — 99213 OFFICE O/P EST LOW 20 MIN: CPT

## 2023-11-14 PROCEDURE — 80048 BASIC METABOLIC PNL TOTAL CA: CPT

## 2023-11-14 PROCEDURE — 83036 HEMOGLOBIN GLYCOSYLATED A1C: CPT

## 2023-11-14 PROCEDURE — G0439 PPPS, SUBSEQ VISIT: HCPCS

## 2023-11-14 PROCEDURE — 90662 IIV NO PRSV INCREASED AG IM: CPT

## 2023-11-14 PROCEDURE — 80061 LIPID PANEL: CPT

## 2023-11-14 PROCEDURE — 82043 UR ALBUMIN QUANTITATIVE: CPT

## 2023-11-14 PROCEDURE — G0008 ADMIN INFLUENZA VIRUS VAC: HCPCS

## 2023-11-14 PROCEDURE — 82570 ASSAY OF URINE CREATININE: CPT

## 2023-11-14 RX ORDER — KETOTIFEN FUMARATE 0.35 MG/ML
1 SOLUTION/ DROPS OPHTHALMIC 2 TIMES DAILY
COMMUNITY
Start: 2023-10-02

## 2023-11-14 NOTE — ASSESSMENT & PLAN NOTE
Lab Results   Component Value Date    CHOLESTEROL 181 10/26/2022    CHOLESTEROL 223 (H) 07/20/2021    CHOLESTEROL 141 06/05/2020     Lab Results   Component Value Date    HDL 44 (L) 10/26/2022    HDL 47 07/20/2021    HDL 59 06/05/2020     Lab Results   Component Value Date    TRIG 132 10/26/2022    TRIG 182 (H) 07/20/2021    TRIG 96 06/05/2020     Lab Results   Component Value Date    [address] 137 10/26/2022    [address] 96 04/15/2019     Lab Results   Component Value Date    LDLCALC 111 10/26/2022     Check lipid panel.  Continue Lipitor 40 mg daily

## 2023-11-14 NOTE — ASSESSMENT & PLAN NOTE
BP Readings from Last 3 Encounters:   11/14/23 138/88   09/21/23 140/72   09/14/23 145/69       - At goal. Continue amlodipine 10 mg daily & Losartan 100 mg daily. - reviewed eating a low salt diet (such as the DASH diet), limiting alcohol, exercising, and wt loss.

## 2024-01-04 DIAGNOSIS — M81.0 AGE-RELATED OSTEOPOROSIS WITHOUT CURRENT PATHOLOGICAL FRACTURE: ICD-10-CM

## 2024-01-04 RX ORDER — ALENDRONATE SODIUM 70 MG/1
70 TABLET ORAL WEEKLY
Qty: 12 TABLET | Refills: 0 | Status: SHIPPED | OUTPATIENT
Start: 2024-01-04

## 2024-02-05 ENCOUNTER — OFFICE VISIT (OUTPATIENT)
Dept: FAMILY MEDICINE CLINIC | Facility: CLINIC | Age: 74
End: 2024-02-05

## 2024-02-05 VITALS
OXYGEN SATURATION: 95 % | HEART RATE: 55 BPM | WEIGHT: 169 LBS | TEMPERATURE: 98.2 F | HEIGHT: 64 IN | RESPIRATION RATE: 16 BRPM | BODY MASS INDEX: 28.85 KG/M2 | DIASTOLIC BLOOD PRESSURE: 70 MMHG | SYSTOLIC BLOOD PRESSURE: 150 MMHG

## 2024-02-05 DIAGNOSIS — M17.11 OSTEOARTHRITIS OF RIGHT KNEE, UNSPECIFIED OSTEOARTHRITIS TYPE: Primary | ICD-10-CM

## 2024-02-05 PROCEDURE — 99213 OFFICE O/P EST LOW 20 MIN: CPT | Performed by: INTERNAL MEDICINE

## 2024-02-05 RX ORDER — SENNOSIDES 8.6 MG
650 CAPSULE ORAL EVERY 8 HOURS PRN
Qty: 30 TABLET | Refills: 0 | Status: SHIPPED | OUTPATIENT
Start: 2024-02-05

## 2024-02-05 NOTE — ASSESSMENT & PLAN NOTE
Chronic. No associated sciatic symptoms or concerns for knee infection at this moment.     Plan  Apply ice/heat therapy to right knee as needed   on weight loss, healthy diet and strengthening exercises as tolerated  Continue Tylenol prn  Apply Voltaren x4 daily  Xray right knee ordered to assess severity of Osteoarthritis  Ambulatory referral to physical therapy  Ambulatory referral to Orthopedic per patient request

## 2024-02-05 NOTE — PROGRESS NOTES
Name: Shraddha Frazier      : 1950      MRN: [id number]  Encounter Provider: Etienne Aggarwal MD  Encounter Date: 2024   Encounter department: [facility]    Assessment & Plan     1. Osteoarthritis of right knee, unspecified osteoarthritis type  Assessment & Plan:  Chronic. No associated sciatic symptoms or concerns for knee infection at this moment.     Plan  Apply ice/heat therapy to right knee as needed   on weight loss, healthy diet and strengthening exercises as tolerated  Continue Tylenol/Naproxen prn  Xray right knee ordered to assess severity of Osteoarthritis  Ambulatory referral to physical therapy  Ambulatory referral to Orthopedic per patient request    Orders:  -     Ambulatory Referral to Physical Therapy; Future  -     Ambulatory Referral to Orthopedic Surgery; Future  -     XR knee 3 vw right non injury; Future; Expected date: 2024  -     acetaminophen (TYLENOL) 650 mg CR tablet; Take 1 tablet (650 mg total) by mouth every 8 (eight) hours as needed for mild pain           Subjective      Patient is a 74 yo F who presents to the office with complains of right leg pain of 1 week duration. Patients states her arthritis is acting up. She had similar complains in the past that waxes and wanes. Tylenol Voltaren gel provides moderate relief of her symptoms. Denies trauma, swelling, numbness or tingling, fever, chills.     HPI  Review of Systems   Constitutional:  Negative for fever.   Respiratory: Negative.     Cardiovascular: Negative.    Gastrointestinal: Negative.    Musculoskeletal:  Positive for myalgias.        Right knee pain   Neurological: Negative.    Hematological: Negative.    Psychiatric/Behavioral: Negative.         Current Outpatient Medications on File Prior to Visit   Medication Sig    alendronate (FOSAMAX) 70 mg tablet TAKE ONE TABLET BY MOUTH ONCE WEEKLY    amLODIPine (NORVASC) 10 mg tablet Take 1 tablet (10 mg total)  "by mouth daily    atorvastatin (LIPITOR) 40 mg tablet Take 1 tablet (40 mg total) by mouth daily    Calcium Carb-Cholecalciferol 600-10 MG-MCG TABS Take 1 tablet by mouth daily    cholecalciferol (VITAMIN D3) 1,000 units tablet Take 2 tablets (2,000 Units total) by mouth daily    cyclobenzaprine (FLEXERIL) 10 mg tablet Take 1 tablet (10 mg total) by mouth 2 (two) times a day as needed for muscle spasms    Diclofenac Sodium (VOLTAREN) 1 % Apply 2 g topically 4 (four) times a day    Ketotifen Fumarate (ZADITOR) 0.035 % ophthalmic solution Apply 1 drop to eye 2 (two) times a day    lidocaine (Lidoderm) 5 % Apply 1 patch topically over 12 hours daily Remove & Discard patch within 12 hours or as directed by MD    losartan (COZAAR) 100 MG tablet Take 1 tablet (100 mg total) by mouth daily    naproxen (Naprosyn) 500 mg tablet Take 1 tablet (500 mg total) by mouth 2 (two) times a day with meals    [DISCONTINUED] acetaminophen (TYLENOL) 500 mg tablet Take 1 tablet (500 mg total) by mouth every 4 (four) hours as needed for mild pain, headaches or fever       Objective     /70 (BP Location: Right arm, Patient Position: Sitting, Cuff Size: Standard)   Pulse 55   Temp 98.2 °F (36.8 °C) (Temporal)   Resp 16   Ht 5' 4\" (1.626 m)   Wt 76.7 kg (169 lb)   SpO2 95%   BMI 29.01 kg/m²     Physical Exam  Constitutional:       General: She is not in acute distress.  HENT:      Head: Atraumatic.   Cardiovascular:      Rate and Rhythm: Normal rate and regular rhythm.      Pulses: Normal pulses.      Heart sounds: Normal heart sounds.   Pulmonary:      Effort: Pulmonary effort is normal.      Breath sounds: Normal breath sounds.   Musculoskeletal:         General: Tenderness present.   Skin:     General: Skin is warm.   Neurological:      General: No focal deficit present.       Etienne Aggarwal MD    "

## 2024-02-06 ENCOUNTER — OFFICE VISIT (OUTPATIENT)
Dept: OBGYN CLINIC | Facility: CLINIC | Age: 74
End: 2024-02-06
Payer: MEDICARE

## 2024-02-06 VITALS
HEIGHT: 64 IN | DIASTOLIC BLOOD PRESSURE: 80 MMHG | BODY MASS INDEX: 32.78 KG/M2 | SYSTOLIC BLOOD PRESSURE: 138 MMHG | WEIGHT: 192 LBS

## 2024-02-06 DIAGNOSIS — M25.561 CHRONIC PAIN OF RIGHT KNEE: ICD-10-CM

## 2024-02-06 DIAGNOSIS — M17.11 PRIMARY OSTEOARTHRITIS OF RIGHT KNEE: Primary | ICD-10-CM

## 2024-02-06 DIAGNOSIS — G89.29 CHRONIC PAIN OF RIGHT KNEE: ICD-10-CM

## 2024-02-06 PROCEDURE — 99203 OFFICE O/P NEW LOW 30 MIN: CPT | Performed by: PHYSICIAN ASSISTANT

## 2024-02-06 PROCEDURE — 20610 DRAIN/INJ JOINT/BURSA W/O US: CPT | Performed by: PHYSICIAN ASSISTANT

## 2024-02-06 RX ORDER — METHYLPREDNISOLONE ACETATE 40 MG/ML
1 INJECTION, SUSPENSION INTRA-ARTICULAR; INTRALESIONAL; INTRAMUSCULAR; SOFT TISSUE
Status: COMPLETED | OUTPATIENT
Start: 2024-02-06 | End: 2024-02-06

## 2024-02-06 RX ORDER — BUPIVACAINE HYDROCHLORIDE 2.5 MG/ML
2 INJECTION, SOLUTION INFILTRATION; PERINEURAL
Status: COMPLETED | OUTPATIENT
Start: 2024-02-06 | End: 2024-02-06

## 2024-02-06 RX ORDER — LIDOCAINE HYDROCHLORIDE 10 MG/ML
2 INJECTION, SOLUTION INFILTRATION; PERINEURAL
Status: COMPLETED | OUTPATIENT
Start: 2024-02-06 | End: 2024-02-06

## 2024-02-06 RX ADMIN — LIDOCAINE HYDROCHLORIDE 2 ML: 10 INJECTION, SOLUTION INFILTRATION; PERINEURAL at 14:30

## 2024-02-06 RX ADMIN — BUPIVACAINE HYDROCHLORIDE 2 ML: 2.5 INJECTION, SOLUTION INFILTRATION; PERINEURAL at 14:30

## 2024-02-06 RX ADMIN — METHYLPREDNISOLONE ACETATE 1 ML: 40 INJECTION, SUSPENSION INTRA-ARTICULAR; INTRALESIONAL; INTRAMUSCULAR; SOFT TISSUE at 14:30

## 2024-02-06 NOTE — PROGRESS NOTES
"Patient Name:  Shraddha Frazier  MRN:  [id number]    Assessment & Plan     Right knee DJD.  Corticosteroid injection performed today into the right knee joint.  Continue Tylenol and Voltaren gel as needed.  Activities as tolerated with modification to avoid pain.  Follow-up in 3 months.  Discussed repeat injection at that time as indicated.     utilized during this encounter.    Chief Complaint     Right knee pain    History of the Present Illness     Shraddha Frazier is a 73 y.o. female who reports to the office today for evaluation of her right knee.  Notes a chronic history of right knee pain which has been ongoing for the past few years.  She denies any injury or trauma.  Initially she did undergo an intra-articular corticosteroid injection into the knee which provided over a year of relief.  She notes worsening pain recently over the past few weeks.  She denies any recent injury or trauma.  She notes generalized right knee pain with associated swelling and stiffness.  Pain is worse with prolonged standing and walking as well as when transitioning from a seated to standing position.  She denies any weakness or instability.  She has been taking Tylenol and utilizing Voltaren gel with limited improvement.  Currently pain is 9 out of 10 in intensity.  She describes a constant dull ache with episodes of sharp severe pain.  No numbness or tingling.  No fevers or chills.    Physical Exam     /80   Ht 5' 4\" (1.626 m)   Wt 87.1 kg (192 lb)   BMI 32.96 kg/m²     Right knee: No gross deformity.  Skin intact.  No erythema ecchymosis or swelling.  No effusion.  Range of motion is full extension and flexion to 120 degrees with pain.  There is crepitation appreciated with passive range of motion as well.  Stable to varus and valgus stress without pain.  Stable Lachman test.  Negative posterior drawer test.  Negative Mandeep's test.  Extensor mechanism is intact.    Eyes: Anicteric " sclerae.  ENT: Trachea midline.  Lungs: Normal respiratory effort.  CV: Capillary refill is less than 2 seconds.  Skin: Intact without erythema.  Lymph: No palpable lymphadenopathy.  Neuro: Sensation is grossly intact to light touch.  Psych: Mood and affect are appropriate.    Data Review     I have personally reviewed pertinent films in PACS, and my interpretation follows:    X-rays right knee 2/6/2024: No acute osseous abnormality.  No fracture or dislocation.  Mild tricompartmental degenerative changes.    Past Medical History:   Diagnosis Date    Decreased hearing, left 5/2/2019    Echocardiogram abnormal 02/01/2016    Mild LVH with normal systolic function EF > 70%. Grade I Diastolic dysfunction. Aortic sclerosis w/o significant stenosis. Mild insufficiency. Mild MR. Mild TR with normal pulmonary artery pressure.     History of mammogram 09/13/2017    Benign    History of mammogram 02/15/2016    Benign    HTN (hypertension)     Osteopenia determined by x-ray 02/03/2016    osteopenia of the left femoral neck and normal bone marrow density of the left hip total. Osteopenia of the lumbar spine.     Primary osteoarthritis of left knee 6/8/2023    Primary osteoarthritis of right knee 8/7/2020       Past Surgical History:   Procedure Laterality Date    BLADDER SUSPENSION      HYSTERECTOMY      age 45       Allergies   Allergen Reactions    Aspirin     Penicillins Other (See Comments) and Rash       Current Outpatient Medications on File Prior to Visit   Medication Sig Dispense Refill    acetaminophen (TYLENOL) 650 mg CR tablet Take 1 tablet (650 mg total) by mouth every 8 (eight) hours as needed for mild pain 30 tablet 0    alendronate (FOSAMAX) 70 mg tablet TAKE ONE TABLET BY MOUTH ONCE WEEKLY 12 tablet 0    amLODIPine (NORVASC) 10 mg tablet Take 1 tablet (10 mg total) by mouth daily 90 tablet 3    atorvastatin (LIPITOR) 40 mg tablet Take 1 tablet (40 mg total) by mouth daily 90 tablet 1    Calcium  Carb-Cholecalciferol 600-10 MG-MCG TABS Take 1 tablet by mouth daily 90 tablet 0    cholecalciferol (VITAMIN D3) 1,000 units tablet Take 2 tablets (2,000 Units total) by mouth daily 180 tablet 3    cyclobenzaprine (FLEXERIL) 10 mg tablet Take 1 tablet (10 mg total) by mouth 2 (two) times a day as needed for muscle spasms 20 tablet 0    Diclofenac Sodium (VOLTAREN) 1 % Apply 2 g topically 4 (four) times a day 100 g 1    Ketotifen Fumarate (ZADITOR) 0.035 % ophthalmic solution Apply 1 drop to eye 2 (two) times a day      lidocaine (Lidoderm) 5 % Apply 1 patch topically over 12 hours daily Remove & Discard patch within 12 hours or as directed by MD 6 patch 0    losartan (COZAAR) 100 MG tablet Take 1 tablet (100 mg total) by mouth daily 90 tablet 3    naproxen (Naprosyn) 500 mg tablet Take 1 tablet (500 mg total) by mouth 2 (two) times a day with meals 30 tablet 0     No current facility-administered medications on file prior to visit.       Social History     Tobacco Use    Smoking status: Never     Passive exposure: Never    Smokeless tobacco: Never   Substance Use Topics    Alcohol use: Yes     Alcohol/week: 1.0 standard drink of alcohol     Types: 1 Glasses of wine per week    Drug use: Never       Family History   Problem Relation Age of Onset    No Known Problems Mother     No Known Problems Father     No Known Problems Sister     No Known Problems Daughter     No Known Problems Maternal Grandmother     No Known Problems Maternal Grandfather     No Known Problems Paternal Grandmother     No Known Problems Paternal Grandfather     No Known Problems Maternal Aunt     No Known Problems Paternal Aunt     No Known Problems Paternal Aunt     No Known Problems Paternal Aunt     No Known Problems Paternal Aunt     No Known Problems Paternal Aunt        Review of Systems     As stated in the HPI. All other systems reviewed and are negative.      Large joint arthrocentesis: R knee  Procedure Details  Location: knee - R  knee  Needle size: 22 G  Ultrasound guidance: no  Approach: anterolateral  Medications administered: 2 mL bupivacaine 0.25 %; 2 mL lidocaine 1 %; 1 mL methylPREDNISolone acetate 40 mg/mL    Patient tolerance: patient tolerated the procedure well with no immediate complications  Dressing:  Sterile dressing applied

## 2024-02-06 NOTE — LETTER
February 6, 2024     Patient: Shraddha Frazier  YOB: 1950  Date of Visit: 2/6/2024      To Whom it May Concern:    Shraddha Frazier is under my professional care. Shraddha was seen in my office on 2/6/2024. Candida return to work without restrictions on 2/7/2024.    If you have any questions or concerns, please don't hesitate to call.         Sincerely,          Kamlesh Nicho Higginbotham PA-C

## 2024-02-12 ENCOUNTER — OFFICE VISIT (OUTPATIENT)
Dept: DENTISTRY | Facility: CLINIC | Age: 74
End: 2024-02-12

## 2024-02-12 VITALS — DIASTOLIC BLOOD PRESSURE: 91 MMHG | HEART RATE: 76 BPM | SYSTOLIC BLOOD PRESSURE: 138 MMHG | TEMPERATURE: 98.4 F

## 2024-02-12 DIAGNOSIS — K03.6 ACCRETIONS ON TEETH: ICD-10-CM

## 2024-02-12 DIAGNOSIS — K05.6 PERIODONTAL DISEASE: Primary | ICD-10-CM

## 2024-02-12 DIAGNOSIS — K03.6 DENTAL CALCULUS: ICD-10-CM

## 2024-02-12 PROCEDURE — D4342 PERIODONTAL SCALING AND ROOT PLANING - 1 TO 3 TEETH PER QUADRANT: HCPCS

## 2024-02-12 PROCEDURE — D4341 PERIODONTAL SCALING AND ROOT PLANING - 4 OR MORE TEETH PER QUADRANT: HCPCS

## 2024-02-12 NOTE — PROGRESS NOTES
SCALING AND ROOT PLANING     ASA: 2  Pain: 0  Reviewed M/H   NO CHANGES  TRANSLATION USED   [id number]   procedure explained  Patient consented to ORAQIX today    SC/RP:      UL/LL   Quad scaling and root planing  Applied  ORAQIX Topical Anesthetic,      Type of Treatment:  Used Ultrasonic and Hand Scaling, Flossed, Polished, Subgingival Irrigation - post tx - Chlorhexidine.  Reviewed OHI  - Brush 2X/day and Floss 1X/day    Oral Hygiene:  good  Plaque:  light  Calculus:  Moderate    Bleeding:  light  Stain:   / Light  /      Treatment Plan:  no changes to tx plan                  Referral:  No referral given  Gave follow-up directions.  NV1:  SC/RP - UL/LL - 60 min   NV2:  3 month perio maintenance - 60 min    PDIGC  she did well with ORAQIX  cold air from suction bothered her more

## 2024-02-15 ENCOUNTER — OFFICE VISIT (OUTPATIENT)
Dept: DENTISTRY | Facility: CLINIC | Age: 74
End: 2024-02-15

## 2024-02-15 VITALS — SYSTOLIC BLOOD PRESSURE: 164 MMHG | TEMPERATURE: 96.6 F | HEART RATE: 70 BPM | DIASTOLIC BLOOD PRESSURE: 71 MMHG

## 2024-02-15 DIAGNOSIS — K05.6 PERIODONTAL DISEASE: Primary | ICD-10-CM

## 2024-02-15 PROCEDURE — D4341 PERIODONTAL SCALING AND ROOT PLANING - 4 OR MORE TEETH PER QUADRANT: HCPCS | Performed by: DENTAL HYGIENIST

## 2024-02-15 NOTE — DENTAL PROCEDURE DETAILS
SCALING AND ROOT PLANING     ASA:  II  Pain:  0  Reviewed M/H    SC/RP:   UR/ LR  Quad scaling and root planning  Applied Gingicaine Topical Anesthetic - pt did well    Type of Treatment:  Used Ultrasonic and Hand Scaling, Flossed, Polished, Subgingival Irrigation - post tx - Chlorhexidine.  Reviewed OHI  - Brush 2X/day and Floss 1X/day    Oral Hygiene:  Poor   Plaque:  Moderate  / Heavy  Calculus:  Moderate /  Heavy  Bleeding:  Moderate /  Heavy    Stain:  Light      Treatment Plan:  no changes to tx plan        Exam:  none                 Referral:  No referral given  Gave follow-up directions.    NV1:  Perio main - 60 min - 3 months from 2/16/24

## 2024-02-28 ENCOUNTER — EVALUATION (OUTPATIENT)
Dept: PHYSICAL THERAPY | Facility: REHABILITATION | Age: 74
End: 2024-02-28
Payer: MEDICARE

## 2024-02-28 DIAGNOSIS — M17.11 OSTEOARTHRITIS OF RIGHT KNEE, UNSPECIFIED OSTEOARTHRITIS TYPE: Primary | ICD-10-CM

## 2024-02-28 PROCEDURE — 97110 THERAPEUTIC EXERCISES: CPT

## 2024-02-28 PROCEDURE — 97161 PT EVAL LOW COMPLEX 20 MIN: CPT

## 2024-02-28 NOTE — PROGRESS NOTES
PT Evaluation     Today's date: 2024  Patient name: Shraddha Frazier  : 1950  MRN: [id number]  Referring provider: Etienne Aggarwal*  Dx:   Encounter Diagnosis     ICD-10-CM    1. Osteoarthritis of right knee, unspecified osteoarthritis type  M17.11 Ambulatory Referral to Physical Therapy          Start Time: 0915  Stop Time: 1000  Total time in clinic (min): 45 minutes    Assessment  Assessment details: Patient is a 73 y.o. female presenting to initial examination with chief complaint of chronic R knee pain. Signs and symptoms are consistent with referred diagnosis. Primary impairments include painful/restricted knee ROM, hypertonic B/L HS musculature, gross weakness of the R knee and B/L LEs, and poor biomechanics with functional transfers. As a result of impairments patient experiences limitations with functional/daily activities including those listed below. Educated patient regarding plan of care and answered all patient questions to patient satisfaction. Patient would benefit from skilled PT interventions to address above impairments, achieve goals, and to maximize function. Thank you for the referral.    Impairments: abnormal gait, abnormal muscle tone, abnormal or restricted ROM, impaired physical strength, lacks appropriate home exercise program, pain with function, weight-bearing intolerance, poor posture  and poor body mechanics  Functional limitations: Prolonged standing, prolonged walking, STS transfers, stair navigation  Symptom irritability: moderateUnderstanding of Dx/Px/POC: good   Prognosis: good    Goals  Impairment Goals: 4-6 weeks  - Patient to decrease pain to 1-2/10  - Patient to improve knee AROM to equal to uninvolved side  - Patient to increase knee strength to 5/5 throughout  - Patient to increase hip strength to 5/5 throughout    Functional Goals: by discharge  - Patient to discharge to independent [facility]  - Patient to return to prior level of function  - Patient to  ambulate in home and community without increased pain or difficulty   - Patient to ascend and descend stairs without increased pain or difficulty  - Patient to complete sit to stand transfers without increased pain or difficulty      Plan  Patient would benefit from: skilled physical therapy  Referral necessary: No  Planned therapy interventions: joint mobilization, manual therapy, abdominal trunk stabilization, balance, body mechanics training, neuromuscular re-education, patient education, postural training, stretching, strengthening, therapeutic activities, therapeutic exercise, home exercise program and functional ROM exercises  Frequency: 1x week  Duration in visits: 4  Duration in weeks: 4  Plan of Care beginning date: 2/28/2024  Plan of Care expiration date: 3/29/2024  Treatment plan discussed with: patient      Subjective Evaluation    History of Present Illness  Mechanism of injury: Candida reports to PT with complaints of chronic R knee pain and presents with her daughter for translation today:    She describes the following regarding her symptoms and subsequent restriction:    - Pain in the right knee that has persisted for 2 years  - imaging performed at the doctors which revealed arthritic changes to the knee  - Had injections in the knee (x2), first one helped and the second one not so much  - when she's working pain is excessive, is up on her feet a lot and walking around  - Getting up out of the chair increases pain  - Has to go slow up the stairs because pian is increase   - pain is surrounding the knee mostly on the sides  - sometimes pain will radiate down into the calf  - reports that she gets swelling in the knee  - sometimes feels like the leg wants to give way because the pain  - knee will stiffen up and feel like its locked up after sitting for a while  Quality of life: good    Patient Goals  Patient goals for therapy: increased strength, decreased edema, independence with ADLs/IADLs, return  to sport/leisure activities, return to work, improved balance, decreased pain and increased motion    Pain  Current pain ratin  At best pain ratin  At worst pain rating: 10  Quality: sharp  Relieving factors: change in position and ice  Aggravating factors: walking, standing, stair climbing, lifting and sitting  Progression: no change    Treatments  Previous treatment: injection treatment        Objective     Palpation     Right   Hypertonic in the distal biceps femoris, distal semimembranosus, distal semitendinosus, medial gastrocnemius and rectus femoris.   Tenderness of the distal biceps femoris, distal semimembranosus, distal semitendinosus, medial gastrocnemius, rectus femoris, vastus lateralis and vastus medialis.     Tenderness     Right Knee   Tenderness in the lateral joint line, medial joint line and popliteal fossa. No tenderness in the fibular head, inferior patella, lateral patella, medial patella, patellar tendon, quadriceps tendon and superior patella.     Active Range of Motion   Left Knee   Normal active range of motion  Flexion: 130 degrees   Extension: 1 degrees     Right Knee   Flexion: 105 degrees with pain  Extension: 2 degrees     Mobility   Patellar Mobility:   Left Knee   WFL: medial, lateral, superior and inferior.     Right Knee   WFL: medial, superior and inferior  Hypermobile: lateral     Strength/Myotome Testing     Left Hip   Planes of Motion   Flexion: 4+  Extension: 4-  Abduction: 4-  Adduction: 4+  External rotation: 4+  Internal rotation: 5    Left Knee   Flexion: 5  Extension: 5  Quadriceps contraction: good    Right Knee   Flexion: 4-  Extension: 4-  Quadriceps contraction: good    Additional Strength Details  Unable to assess strength in the R hip due to excessive pain at the knee.  Will be tested at later visit    Functional Assessment      Squat    Pain, trunk lean left, left valgus, left tibial anterior translation beyond toes, right valgus and right tibial anterior  translation beyond toes.     Comments  Functional testing held due to time constraints of visit.  Will be assessed at time of NV             Precautions: N/A      Manuals 2/28                                                                Neuro Re-Ed             Standing Hip ABD HEP            Clamshells vs TB PLAN            STS + Hinge Cues PLAN            SLR + Quad Set  PLAN                                                   Ther Ex             Heel Slides HEP            Seated HS Stretch HEP            Figure 4 Stretch Supine PLAN            Leg Press PLAN            Rec Bike PLAN                                                   Ther Activity                                       Gait Training                                       Modalities

## 2024-03-07 ENCOUNTER — OFFICE VISIT (OUTPATIENT)
Dept: FAMILY MEDICINE CLINIC | Facility: CLINIC | Age: 74
End: 2024-03-07

## 2024-03-07 VITALS
DIASTOLIC BLOOD PRESSURE: 74 MMHG | HEART RATE: 89 BPM | WEIGHT: 165.7 LBS | RESPIRATION RATE: 18 BRPM | TEMPERATURE: 98 F | SYSTOLIC BLOOD PRESSURE: 150 MMHG | OXYGEN SATURATION: 99 % | BODY MASS INDEX: 28.29 KG/M2 | HEIGHT: 64 IN

## 2024-03-07 DIAGNOSIS — M17.11 PRIMARY OSTEOARTHRITIS OF RIGHT KNEE: Primary | ICD-10-CM

## 2024-03-07 PROCEDURE — 99213 OFFICE O/P EST LOW 20 MIN: CPT

## 2024-03-07 PROCEDURE — G2211 COMPLEX E/M VISIT ADD ON: HCPCS

## 2024-03-07 NOTE — PROGRESS NOTES
Name: Shraddha Frazier      : 1950      MRN: [id number]  Encounter Provider: MINDY Teague  Encounter Date: 3/7/2024   [facility]    Assessment & Plan     1. Primary osteoarthritis of right knee  Assessment & Plan:  Following orthopedics for management. Received steroid injection at last appointment on 24.  Following with physical therapy, and reports improvement with therapy. Advised to continue.   Requesting refill of lidocaine patches.   Advised to continue heat/ice therapy, tylenol PRN, and voltaran QID as previously recommended. Will complete fmla paper work once I receive.           BMI Counseling: Body mass index is 28.44 kg/m². The BMI is above normal. Nutrition recommendations include decreasing portion sizes, encouraging healthy choices of fruits and vegetables, decreasing fast food intake, consuming healthier snacks, limiting drinks that contain sugar, moderation in carbohydrate intake, increasing intake of lean protein, reducing intake of saturated and trans fat and reducing intake of cholesterol. Exercise recommendations include moderate physical activity 150 minutes/week. No pharmacotherapy was ordered. Rationale for BMI follow-up plan is due to patient being overweight or obese.         Subjective     Shraddha Douglas is a 72 y.o. female  has a past medical history of Decreased hearing, HTN, Osteopenia, Primary osteoarthritis of right knee.  Patient presents today to request disability paperwork related to her right knee pain. She states she continues to work 5 days a week, but can only tolerate 4 days a week. Her work is willing to give her this accomodation as long as she receives the proper paperwork from primary care.     Review of Systems   Constitutional:  Negative for chills and fever.   HENT:  Negative for ear pain and sore throat.    Eyes:  Negative for pain and visual disturbance.   Respiratory:   Negative for cough and shortness of breath.    Cardiovascular:  Negative for chest pain and palpitations.   Gastrointestinal:  Negative for abdominal pain and vomiting.   Genitourinary:  Negative for dysuria and hematuria.   Musculoskeletal:  Positive for arthralgias. Negative for back pain.   Skin:  Negative for color change and rash.   Neurological:  Negative for seizures and syncope.   All other systems reviewed and are negative.      Past Medical History:   Diagnosis Date    Decreased hearing, left 5/2/2019    Echocardiogram abnormal 02/01/2016    Mild LVH with normal systolic function EF > 70%. Grade I Diastolic dysfunction. Aortic sclerosis w/o significant stenosis. Mild insufficiency. Mild MR. Mild TR with normal pulmonary artery pressure.     History of mammogram 09/13/2017    Benign    History of mammogram 02/15/2016    Benign    HTN (hypertension)     Osteopenia determined by x-ray 02/03/2016    osteopenia of the left femoral neck and normal bone marrow density of the left hip total. Osteopenia of the lumbar spine.     Primary osteoarthritis of left knee 6/8/2023    Primary osteoarthritis of right knee 8/7/2020     Past Surgical History:   Procedure Laterality Date    BLADDER SUSPENSION      HYSTERECTOMY      age 45     Family History   Problem Relation Age of Onset    No Known Problems Mother     No Known Problems Father     No Known Problems Sister     No Known Problems Daughter     No Known Problems Maternal Grandmother     No Known Problems Maternal Grandfather     No Known Problems Paternal Grandmother     No Known Problems Paternal Grandfather     No Known Problems Maternal Aunt     No Known Problems Paternal Aunt     No Known Problems Paternal Aunt     No Known Problems Paternal Aunt     No Known Problems Paternal Aunt     No Known Problems Paternal Aunt      Social History     Socioeconomic History    Marital status:      Spouse name: None    Number of children: None    Years of education:  None    Highest education level: None   Occupational History    None   Tobacco Use    Smoking status: Never     Passive exposure: Never    Smokeless tobacco: Never   Vaping Use    Vaping status: Never Used   Substance and Sexual Activity    Alcohol use: Yes     Alcohol/week: 1.0 standard drink of alcohol     Types: 1 Glasses of wine per week    Drug use: Never    Sexual activity: None   Other Topics Concern    None   Social History Narrative    None     Social Determinants of Health     Financial Resource Strain: Low Risk  (8/14/2023)    Overall Financial Resource Strain (CARDIA)     Difficulty of Paying Living Expenses: Not hard at all   Food Insecurity: No Food Insecurity (8/14/2023)    Hunger Vital Sign     Worried About Running Out of Food in the Last Year: Never true     Ran Out of Food in the Last Year: Never true   Transportation Needs: No Transportation Needs (8/14/2023)    PRAPARE - Transportation     Lack of Transportation (Medical): No     Lack of Transportation (Non-Medical): No   Physical Activity: Not on file   Stress: Not on file   Social Connections: Not on file   Intimate Partner Violence: Not on file   Housing Stability: Not on file     Current Outpatient Medications on File Prior to Visit   Medication Sig    acetaminophen (TYLENOL) 650 mg CR tablet Take 1 tablet (650 mg total) by mouth every 8 (eight) hours as needed for mild pain    alendronate (FOSAMAX) 70 mg tablet TAKE ONE TABLET BY MOUTH ONCE WEEKLY    amLODIPine (NORVASC) 10 mg tablet Take 1 tablet (10 mg total) by mouth daily    atorvastatin (LIPITOR) 40 mg tablet Take 1 tablet (40 mg total) by mouth daily    Calcium Carb-Cholecalciferol 600-10 MG-MCG TABS Take 1 tablet by mouth daily    cholecalciferol (VITAMIN D3) 1,000 units tablet Take 2 tablets (2,000 Units total) by mouth daily    cyclobenzaprine (FLEXERIL) 10 mg tablet Take 1 tablet (10 mg total) by mouth 2 (two) times a day as needed for muscle spasms    Diclofenac Sodium (VOLTAREN)  "1 % Apply 2 g topically 4 (four) times a day    Ketotifen Fumarate (ZADITOR) 0.035 % ophthalmic solution Apply 1 drop to eye 2 (two) times a day    lidocaine (Lidoderm) 5 % Apply 1 patch topically over 12 hours daily Remove & Discard patch within 12 hours or as directed by MD    losartan (COZAAR) 100 MG tablet Take 1 tablet (100 mg total) by mouth daily    naproxen (Naprosyn) 500 mg tablet Take 1 tablet (500 mg total) by mouth 2 (two) times a day with meals     Allergies   Allergen Reactions    Aspirin     Penicillins Other (See Comments) and Rash     Immunization History   Administered Date(s) Administered    COVID-19 MODERNA VACC 0.5 ML IM 01/31/2021, 02/28/2021    INFLUENZA 01/25/2016, 12/16/2016, 01/26/2018, 04/13/2022, 10/20/2022    Influenza, high dose seasonal 0.7 mL 01/28/2019, 11/09/2020, 04/13/2022, 10/20/2022, 11/14/2023    Pneumococcal Conjugate 13-Valent 04/12/2017    Pneumococcal Polysaccharide PPV23 01/26/2018    Tdap 04/12/2017       Objective     /74 (BP Location: Right arm, Patient Position: Sitting, Cuff Size: Standard)   Pulse 89   Temp 98 °F (36.7 °C) (Temporal)   Resp 18   Ht 5' 4\" (1.626 m)   Wt 75.2 kg (165 lb 11.2 oz)   LMP  (LMP Unknown)   SpO2 99%   Breastfeeding No   BMI 28.44 kg/m²     Physical Exam  Vitals and nursing note reviewed.   Constitutional:       General: She is not in acute distress.     Appearance: Normal appearance.   HENT:      Head: Normocephalic and atraumatic.      Right Ear: External ear normal.      Left Ear: External ear normal.      Nose: Nose normal.   Eyes:      Extraocular Movements: Extraocular movements intact.      Conjunctiva/sclera: Conjunctivae normal.      Pupils: Pupils are equal, round, and reactive to light.   Cardiovascular:      Rate and Rhythm: Normal rate and regular rhythm.      Pulses: Normal pulses.      Heart sounds: Normal heart sounds.   Pulmonary:      Effort: Pulmonary effort is normal.      Breath sounds: Normal breath " sounds.   Abdominal:      General: Bowel sounds are normal.      Palpations: Abdomen is soft.      Tenderness: There is no abdominal tenderness.   Musculoskeletal:         General: Normal range of motion.      Cervical back: Normal range of motion.      Right knee: Crepitus present.      Left knee: Normal.   Skin:     General: Skin is warm and dry.   Neurological:      General: No focal deficit present.      Mental Status: She is alert and oriented to person, place, and time. Mental status is at baseline.   Psychiatric:         Mood and Affect: Mood normal.         Behavior: Behavior normal.         Thought Content: Thought content normal.         Judgment: Judgment normal.       MINDY Teague

## 2024-03-07 NOTE — ASSESSMENT & PLAN NOTE
Following orthopedics for management. Received steroid injection at last appointment on 2/6/24.  Following with physical therapy, and reports improvement with therapy. Advised to continue.   Requesting refill of lidocaine patches.   Advised to continue heat/ice therapy, tylenol PRN, and voltaran QID as previously recommended. Will complete fmla paper work once I receive.

## 2024-03-08 ENCOUNTER — OFFICE VISIT (OUTPATIENT)
Dept: PHYSICAL THERAPY | Facility: REHABILITATION | Age: 74
End: 2024-03-08
Payer: MEDICARE

## 2024-03-08 DIAGNOSIS — M17.11 OSTEOARTHRITIS OF RIGHT KNEE, UNSPECIFIED OSTEOARTHRITIS TYPE: Primary | ICD-10-CM

## 2024-03-08 PROCEDURE — 97110 THERAPEUTIC EXERCISES: CPT

## 2024-03-08 PROCEDURE — 97112 NEUROMUSCULAR REEDUCATION: CPT

## 2024-03-08 NOTE — PROGRESS NOTES
"Daily Note     Today's date: 3/8/2024  Patient name: Shraddha Frazier  : 1950  MRN: [id number]  Referring provider: Etienne Aggarwal*  Dx:   Encounter Diagnosis     ICD-10-CM    1. Osteoarthritis of right knee, unspecified osteoarthritis type  M17.11           Start Time: 0815  Stop Time: 0900  Total time in clinic (min): 45 minutes    Subjective: Shraddha reports that she is doing okay and that the knee feels a bit better.  Has been working on her exercises at home.    ( service utilized throughout duration of today's session)    Objective: See treatment diary below      Assessment: Tolerated treatment well. Patient demonstrated fatigue post treatment Capable of initiating gentle hip strenghteing/biomechinc work today with appropriate symptom response.  Slight increase in pain in hip rather than the knee with trial of SL hip abd progressed from standing but this was held.  Plan to progress as tolerated.      Plan: Continue per plan of care.      Precautions: N/A      Manuals 2/28 3/8                                                               Neuro Re-Ed             Standing Hip ABD HEP 1x10 SL - HELD           Clamshells vs TB PLAN 1x10 (no band)           STS + Hinge Cues PLAN 2x10           SLR + Quad Set  PLAN            SLS Balance  3x30\"ea           Bridges + Eccentric Lower  2x10                        Ther Ex             Heel Slides HEP            Seated HS Stretch HEP            Figure 4 Stretch Supine PLAN            Leg Press PLAN            Rec Bike PLAN 10min                                                  Ther Activity                                       Gait Training                                       Modalities                                            "

## 2024-03-11 ENCOUNTER — TELEPHONE (OUTPATIENT)
Dept: FAMILY MEDICINE CLINIC | Facility: CLINIC | Age: 74
End: 2024-03-11

## 2024-03-11 NOTE — TELEPHONE ENCOUNTER
PCP SIGNATURE NEEDED FOR [employer]/FMLA FORM RECEIVED VIA FAX AND PLACED IN PCP FOLDER TO BE DELIVERED AT ASSIGNED TIMES.    Received: 3/8/24

## 2024-03-14 NOTE — TELEPHONE ENCOUNTER
FAXED ON 03/14/24 TO [facility]  at [phone]. FAX CONFIRMATION RECEIVED.

## 2024-03-15 ENCOUNTER — OFFICE VISIT (OUTPATIENT)
Dept: PHYSICAL THERAPY | Facility: REHABILITATION | Age: 74
End: 2024-03-15
Payer: MEDICARE

## 2024-03-15 DIAGNOSIS — M17.11 OSTEOARTHRITIS OF RIGHT KNEE, UNSPECIFIED OSTEOARTHRITIS TYPE: Primary | ICD-10-CM

## 2024-03-15 PROCEDURE — 97112 NEUROMUSCULAR REEDUCATION: CPT

## 2024-03-15 PROCEDURE — 97110 THERAPEUTIC EXERCISES: CPT

## 2024-03-15 NOTE — PROGRESS NOTES
"Daily Note     Today's date: 3/15/2024  Patient name: Shraddha Frazier  : 1950  MRN: [id number]  Referring provider: Etienne Aggarwal*  Dx:   Encounter Diagnosis     ICD-10-CM    1. Osteoarthritis of right knee, unspecified osteoarthritis type  M17.11           Start Time: 0830  Stop Time: 0900  Total time in clinic (min): 30 minutes    Subjective: Shraddha reports that she felt better after last session.  Notes that things are feeling better with activities at home as well.    ( service utilized throughout duration of today's session)    Objective: See treatment diary below      Assessment: Tolerated treatment well. Patient demonstrated fatigue post treatment, exhibited good technique with therapeutic exercises, and would benefit from continued PT. Patient continues to respond well  to current program and was capable of progressing repetitions with current hip strengthening interventions.  Addition of banded waddle walk to challenge hip ABD endurance.  Plan to progress as tolerated.       Plan: Continue per plan of care.      Precautions: N/A      Manuals 2/28 3/8 3/15                                                              Neuro Re-Ed             Standing Hip ABD HEP 1x10 SL - HELD 1x10          Clamshells vs TB PLAN 1x10 (no band) 2x10 (YTB)          STS + Hinge Cues PLAN 2x10 2x10          SLR + Quad Set  PLAN            SLS Balance  3x30\"ea 3x30\"ea          Bridges + Eccentric Lower  2x10 3x10          Waddle Walk Isos   3x30\" (YTB)          Ther Ex             Heel Slides HEP            Seated HS Stretch HEP            Figure 4 Stretch Supine PLAN            Leg Press PLAN            Rec Bike PLAN 10min 10min                                                 Ther Activity                                       Gait Training                                       Modalities                                            "

## 2024-03-18 ENCOUNTER — OFFICE VISIT (OUTPATIENT)
Dept: FAMILY MEDICINE CLINIC | Facility: CLINIC | Age: 74
End: 2024-03-18

## 2024-03-18 VITALS
HEIGHT: 64 IN | SYSTOLIC BLOOD PRESSURE: 130 MMHG | WEIGHT: 164.25 LBS | DIASTOLIC BLOOD PRESSURE: 72 MMHG | BODY MASS INDEX: 28.04 KG/M2 | OXYGEN SATURATION: 97 % | HEART RATE: 77 BPM | TEMPERATURE: 96.5 F | RESPIRATION RATE: 18 BRPM

## 2024-03-18 DIAGNOSIS — Z23 ENCOUNTER FOR IMMUNIZATION: ICD-10-CM

## 2024-03-18 DIAGNOSIS — M17.11 PRIMARY OSTEOARTHRITIS OF RIGHT KNEE: Primary | ICD-10-CM

## 2024-03-18 DIAGNOSIS — I10 ESSENTIAL HYPERTENSION: ICD-10-CM

## 2024-03-18 PROBLEM — R05.3 CHRONIC COUGH: Status: RESOLVED | Noted: 2021-09-21 | Resolved: 2024-03-18

## 2024-03-18 PROCEDURE — 90471 IMMUNIZATION ADMIN: CPT

## 2024-03-18 PROCEDURE — 99213 OFFICE O/P EST LOW 20 MIN: CPT

## 2024-03-18 PROCEDURE — 90750 HZV VACC RECOMBINANT IM: CPT

## 2024-03-18 RX ORDER — ZOSTER VACCINE RECOMBINANT, ADJUVANTED 50 MCG/0.5
0.5 KIT INTRAMUSCULAR ONCE
Qty: 1 EACH | Refills: 1 | Status: SHIPPED | OUTPATIENT
Start: 2024-03-18 | End: 2024-03-18

## 2024-03-18 NOTE — ASSESSMENT & PLAN NOTE
-Use Tylenol XR prn + Voltaren gel  -May use voltaren gel daily if prn is ineffective.   -Encouraged Heat/Ice application  -Discussed regular exercise for skeletal strengthening.  -Encouraged using a cane on stronger side to take stress off joints  -Encouraged using a tibiofemoral knee brace.  - continue PT and ortho follow-up. FMLA paper work completed and faxed on 03/14/24. Advised pt to check fmla status with employer.

## 2024-03-18 NOTE — ASSESSMENT & PLAN NOTE
BP Readings from Last 3 Encounters:   03/18/24 130/72   03/07/24 150/74   02/15/24 164/71     - At goal. Continue Losartan & amlodipine

## 2024-03-18 NOTE — PROGRESS NOTES
Name: Shraddha Frazier      : 1950      MRN: [id number]  Encounter Provider: MINDY Teague  Encounter Date: 3/18/2024   Encounter department: [facility]    Assessment & Plan     1. Primary osteoarthritis of right knee  Assessment & Plan:  -Use Tylenol XR prn + Voltaren gel  -May use voltaren gel daily if prn is ineffective.   -Encouraged Heat/Ice application  -Discussed regular exercise for skeletal strengthening.  -Encouraged using a cane on stronger side to take stress off joints  -Encouraged using a tibiofemoral knee brace.  - continue PT and ortho follow-up. FMLA paper work completed and faxed on 24. Advised pt to check fmla status with employer.         2. Encounter for immunization  -     Zoster Vac Recomb Adjuvanted (Shingrix) 50 MCG/0.5ML SUSR; Inject 0.5 mL into a muscle once for 1 dose Repeat dose in 2 to 6 months  -     Zoster Vaccine Recombinant IM    3. Essential hypertension  Assessment & Plan:  BP Readings from Last 3 Encounters:   24 130/72   24 150/74   02/15/24 164/71     - At goal. Continue Losartan & amlodipine             Subjective      Shraddha Frazier is a 73 y.o. female  has a past medical history of Chronic cough, Decreased hearing, left, Echocardiogram abnormal, History of mammogram, History of mammogram, HTN (hypertension), Osteopenia determined by x-ray, Primary osteoarthritis of left knee, and Primary osteoarthritis of right knee.  has a past surgical history that includes Hysterectomy and Bladder suspension.    She presents today for a knee pain follow-up. Chronic conditions are stable unless otherwise noted.        Review of Systems   Constitutional:  Negative for chills and fever.   HENT:  Negative for ear pain and sore throat.    Eyes:  Negative for pain and visual disturbance.   Respiratory:  Negative for cough and shortness of breath.    Cardiovascular:  Negative for chest pain and palpitations.  "  Gastrointestinal:  Negative for abdominal pain and vomiting.   Genitourinary:  Negative for dysuria and hematuria.   Musculoskeletal:  Positive for arthralgias. Negative for back pain.   Skin:  Negative for color change and rash.   Neurological:  Negative for seizures and syncope.   All other systems reviewed and are negative.      Current Outpatient Medications on File Prior to Visit   Medication Sig    acetaminophen (TYLENOL) 650 mg CR tablet Take 1 tablet (650 mg total) by mouth every 8 (eight) hours as needed for mild pain    alendronate (FOSAMAX) 70 mg tablet TAKE ONE TABLET BY MOUTH ONCE WEEKLY    amLODIPine (NORVASC) 10 mg tablet Take 1 tablet (10 mg total) by mouth daily    atorvastatin (LIPITOR) 40 mg tablet Take 1 tablet (40 mg total) by mouth daily    Calcium Carb-Cholecalciferol 600-10 MG-MCG TABS Take 1 tablet by mouth daily    cholecalciferol (VITAMIN D3) 1,000 units tablet Take 2 tablets (2,000 Units total) by mouth daily    cyclobenzaprine (FLEXERIL) 10 mg tablet Take 1 tablet (10 mg total) by mouth 2 (two) times a day as needed for muscle spasms    Diclofenac Sodium (VOLTAREN) 1 % Apply 2 g topically 4 (four) times a day    Ketotifen Fumarate (ZADITOR) 0.035 % ophthalmic solution Apply 1 drop to eye 2 (two) times a day    lidocaine (Lidoderm) 5 % Apply 1 patch topically over 12 hours daily Remove & Discard patch within 12 hours or as directed by MD    losartan (COZAAR) 100 MG tablet Take 1 tablet (100 mg total) by mouth daily    naproxen (Naprosyn) 500 mg tablet Take 1 tablet (500 mg total) by mouth 2 (two) times a day with meals       Objective     /72 (BP Location: Right arm, Patient Position: Sitting, Cuff Size: Standard)   Pulse 77   Temp (!) 96.5 °F (35.8 °C) (Temporal)   Resp 18   Ht 5' 4\" (1.626 m)   Wt 74.5 kg (164 lb 4 oz)   LMP  (LMP Unknown)   SpO2 97%   BMI 28.19 kg/m²     Physical Exam  Vitals and nursing note reviewed.   HENT:      Head: Normocephalic and atraumatic.    "   Right Ear: External ear normal.      Left Ear: External ear normal.      Nose: Nose normal.   Eyes:      Extraocular Movements: Extraocular movements intact.      Conjunctiva/sclera: Conjunctivae normal.      Pupils: Pupils are equal, round, and reactive to light.   Cardiovascular:      Rate and Rhythm: Normal rate and regular rhythm.      Pulses: Normal pulses.      Heart sounds: Normal heart sounds.   Pulmonary:      Effort: Pulmonary effort is normal.      Breath sounds: Normal breath sounds.   Abdominal:      General: Bowel sounds are normal.      Palpations: Abdomen is soft.      Tenderness: There is no abdominal tenderness.   Musculoskeletal:         General: Normal range of motion.      Cervical back: Normal range of motion.      Right knee: Swelling present. Tenderness present.   Skin:     General: Skin is warm and dry.   Neurological:      General: No focal deficit present.      Mental Status: She is alert and oriented to person, place, and time. Mental status is at baseline.   Psychiatric:         Mood and Affect: Mood normal.         Behavior: Behavior normal.         Thought Content: Thought content normal.         Judgment: Judgment normal.       MINDY Teague

## 2024-03-22 ENCOUNTER — OFFICE VISIT (OUTPATIENT)
Dept: PHYSICAL THERAPY | Facility: REHABILITATION | Age: 74
End: 2024-03-22
Payer: MEDICARE

## 2024-03-22 DIAGNOSIS — M17.11 OSTEOARTHRITIS OF RIGHT KNEE, UNSPECIFIED OSTEOARTHRITIS TYPE: Primary | ICD-10-CM

## 2024-03-22 PROCEDURE — 97110 THERAPEUTIC EXERCISES: CPT

## 2024-03-22 PROCEDURE — 97112 NEUROMUSCULAR REEDUCATION: CPT

## 2024-03-22 NOTE — PROGRESS NOTES
"Daily Note     Today's date: 3/22/2024  Patient name: Shraddha Frazier  : 1950  MRN: [id number]  Referring provider: Etienne Aggarwal*  Dx:   Encounter Diagnosis     ICD-10-CM    1. Osteoarthritis of right knee, unspecified osteoarthritis type  M17.11           Start Time: 0815  Stop Time: 0900  Total time in clinic (min): 45 minutes    Subjective: Shraddha reports that she is feeling much better regarding her knee.    ( service utilized throughout duration of today's session)    Objective: See treatment diary below      Assessment: Tolerated treatment well. Patient demonstrated fatigue post treatment, exhibited good technique with therapeutic exercises, and would benefit from continued PT. Patient continues to respond well  to current program. Progressions made in repetitions/resistance for LE strengthening today as well. Initiated leg press intervention to further tax quad strength. Plan to re-assess at time of NV      Plan: Progress note during next visit.      Precautions: N/A      Manuals 2/28 3/8 3/15 3/22                                                             Neuro Re-Ed             Standing Hip ABD HEP 1x10 SL - HELD 1x10          Clamshells vs TB PLAN 1x10 (no band) 2x10 (YTB) 3x10 (RTB)         STS + Hinge Cues PLAN 2x10 2x10 3x10         SLR + Quad Set  PLAN   3x10         SLS Balance  3x30\"ea 3x30\"ea 3x30\"ea         Bridges + Eccentric Lower  2x10 3x10 3x10 (RTB)         Waddle Walk Isos   3x30\" (YTB) 3x30\" (RTB)         Ther Ex             Heel Slides HEP            Seated HS Stretch HEP            Figure 4 Stretch Supine PLAN            Leg Press PLAN   2x10 (#65)         Rec Bike PLAN 10min 10min 10min                                                Ther Activity                                       Gait Training                                       Modalities                                            "

## 2024-03-29 ENCOUNTER — EVALUATION (OUTPATIENT)
Dept: PHYSICAL THERAPY | Facility: REHABILITATION | Age: 74
End: 2024-03-29
Payer: MEDICARE

## 2024-03-29 DIAGNOSIS — M17.11 OSTEOARTHRITIS OF RIGHT KNEE, UNSPECIFIED OSTEOARTHRITIS TYPE: Primary | ICD-10-CM

## 2024-03-29 PROCEDURE — 97110 THERAPEUTIC EXERCISES: CPT

## 2024-03-29 PROCEDURE — 97112 NEUROMUSCULAR REEDUCATION: CPT

## 2024-03-29 NOTE — PROGRESS NOTES
PT Re-Evaluation     Today's date: 3/29/2024  Patient name: Shraddha Frazier  : 1950  MRN: [id number]  Referring provider: Etienne Aggarwal*  Dx:   Encounter Diagnosis     ICD-10-CM    1. Osteoarthritis of right knee, unspecified osteoarthritis type  M17.11             Start Time: 0825  Stop Time: 0910  Total time in clinic (min): 45 minutes    Assessment  Assessment details: Patient is a 73 y.o. female presenting to re-examination with chief complaint of chronic R knee pain. Patient exhibits favorable progress toward objective and functional goals at time of reexamination. Patient exhibits improvements with knee ROM, knee/hip strength, biomechanics with functional tranfers, and functional strength/endurance since initiating PT however continues to have limitations compared to prior level of function. Remaining functional limitations include those listed below. As a result of impairments patient has continued limitations with daily and functional activities and would benefit from continued skilled PT interventions to address these impairments in order to maximize function.     Impairments: abnormal gait, abnormal muscle tone, abnormal or restricted ROM, impaired physical strength, lacks appropriate home exercise program, pain with function, weight-bearing intolerance, poor posture  and poor body mechanics  Functional limitations: Prolonged standing, prolonged walking, STS transfers, stair navigation  Symptom irritability: moderateUnderstanding of Dx/Px/POC: good   Prognosis: good    Goals  Impairment Goals: 4-6 weeks  - Patient to decrease pain to 1-2/10 (25% MET)  - Patient to improve knee AROM to equal to uninvolved side (75% MET)  - Patient to increase knee strength to 5/5 throughout (50% MET)  - Patient to increase hip strength to 5/5 throughout (25% mET)    Functional Goals: by discharge  - Patient to discharge to independent [facility] (NOT MET)  - Patient to return to prior level of function (NOT  MET)  - Patient to ambulate in home and community without increased pain or difficulty (25% MET)  - Patient to ascend and descend stairs without increased pain or difficulty (25% MET)  - Patient to complete sit to stand transfers without increased pain or difficulty (25% MET)      Plan  Patient would benefit from: skilled physical therapy  Referral necessary: No  Planned therapy interventions: joint mobilization, manual therapy, abdominal trunk stabilization, balance, body mechanics training, neuromuscular re-education, patient education, postural training, stretching, strengthening, therapeutic activities, therapeutic exercise, home exercise program and functional ROM exercises  Frequency: 1x week  Duration in visits: 4  Duration in weeks: 4  Plan of Care beginning date: 3/29/2024  Plan of Care expiration date: 4/26/2024  Treatment plan discussed with: patient      Subjective Evaluation    History of Present Illness  Mechanism of injury: Candida reports to PT re-evaluation and reports that she has seen improvements in her symptoms since starting PT.  She describes the following regarding her current status:    -reports that everything is feeling better but will still have good days and bad days  - when she has longer work shifts, at the end of the day her pain is increased.  States she is up on her feet a lot  - in general getting out of chairs and stairs are going better  - pain is in similar locations  - states she has been trying to keep up with her exercises everyday and she feels this helps          Shraddha reports to PT with complaints of chronic R knee pain and presents with her daughter for translation today:    She describes the following regarding her symptoms and subsequent restriction:    - Pain in the right knee that has persisted for 2 years  - imaging performed at the doctors which revealed arthritic changes to the knee  - Had injections in the knee (x2), first one helped and the second one not so  much  - when she's working pain is excessive, is up on her feet a lot and walking around  - Getting up out of the chair increases pain  - Has to go slow up the stairs because pian is increase   - pain is surrounding the knee mostly on the sides  - sometimes pain will radiate down into the calf  - reports that she gets swelling in the knee  - sometimes feels like the leg wants to give way because the pain  - knee will stiffen up and feel like its locked up after sitting for a while  Quality of life: good    Patient Goals  Patient goals for therapy: increased strength, decreased edema, independence with ADLs/IADLs, return to sport/leisure activities, return to work, improved balance, decreased pain and increased motion    Pain  Current pain ratin  At best pain ratin  At worst pain ratin  Quality: sharp  Relieving factors: change in position and ice  Aggravating factors: walking, standing, stair climbing, lifting and sitting  Progression: improved    Treatments  Previous treatment: injection treatment        Objective     Palpation     Right   Hypertonic in the distal biceps femoris, distal semimembranosus, distal semitendinosus, medial gastrocnemius and rectus femoris.   Tenderness of the distal biceps femoris, distal semimembranosus, distal semitendinosus, medial gastrocnemius, rectus femoris, vastus lateralis and vastus medialis.     Tenderness     Right Knee   Tenderness in the lateral joint line, medial joint line and popliteal fossa. No tenderness in the fibular head, inferior patella, lateral patella, medial patella, patellar tendon, quadriceps tendon and superior patella.     Additional Tenderness Details  TTP in similar regions though reduced intensity    Active Range of Motion   Left Knee   Normal active range of motion  Flexion: 130 degrees   Extension: 1 degrees     Right Knee   Flexion: 120 degrees with pain  Extension: 2 degrees     Mobility   Patellar Mobility:   Left Knee   WFL: medial,  "lateral, superior and inferior.     Right Knee   WFL: medial, superior and inferior  Hypermobile: lateral     Strength/Myotome Testing     Left Hip   Planes of Motion   Flexion: 4+  Extension: 4-  Abduction: 4-  Adduction: 4+  External rotation: 4+  Internal rotation: 5    Right Hip   Planes of Motion   Flexion: 4  Extension: 4  Abduction: 4-  External rotation: 4  Internal rotation: 4+    Left Knee   Flexion: 5  Extension: 5  Quadriceps contraction: good    Right Knee   Flexion: 4+  Extension: 4+  Quadriceps contraction: good    Additional Strength Details  Unable to assess strength in the R hip due to excessive pain at the knee.  Will be tested at later visit    Functional Assessment      Squat    Pain. No trunk lean left, no left valgus, no left tibial anterior translation beyond toes, no right valgus and no right tibial anterior translation beyond toes.     Comments  30 Sec STS assessment:  - Trial 1: x7 Reps  - Trial 2: x7 reps  - Trial 3: x7 reps             Precautions: N/A      Manuals 2/28 3/8 3/15 3/22 3/29                                                            Neuro Re-Ed     RE-EVAL        Standing Hip ABD HEP 1x10 SL - HELD 1x10          Clamshells vs TB PLAN 1x10 (no band) 2x10 (YTB) 3x10 (RTB)         STS + Hinge Cues PLAN 2x10 2x10 3x10         SLR + Quad Set  PLAN   3x10         SLS Balance  3x30\"ea 3x30\"ea 3x30\"ea         Bridges + Eccentric Lower  2x10 3x10 3x10 (RTB)         Waddle Walk Isos   3x30\" (YTB) 3x30\" (RTB)         Ther Ex             Heel Slides HEP            Seated HS Stretch HEP            Figure 4 Stretch Supine PLAN            Leg Press PLAN   2x10 (#65)         Rec Bike PLAN 10min 10min 10min 10min                                               Ther Activity                                       Gait Training                                       Modalities                                              "

## 2024-04-05 ENCOUNTER — OFFICE VISIT (OUTPATIENT)
Dept: PHYSICAL THERAPY | Facility: REHABILITATION | Age: 74
End: 2024-04-05
Payer: MEDICARE

## 2024-04-05 DIAGNOSIS — M17.11 OSTEOARTHRITIS OF RIGHT KNEE, UNSPECIFIED OSTEOARTHRITIS TYPE: Primary | ICD-10-CM

## 2024-04-05 PROCEDURE — 97110 THERAPEUTIC EXERCISES: CPT

## 2024-04-05 PROCEDURE — 97112 NEUROMUSCULAR REEDUCATION: CPT

## 2024-04-05 NOTE — PROGRESS NOTES
"Daily Note     Today's date: 2024  Patient name: Shraddha Frazier  : 1950  MRN: [id number]  Referring provider: Etienne Aggarwal*  Dx:   Encounter Diagnosis     ICD-10-CM    1. Osteoarthritis of right knee, unspecified osteoarthritis type  M17.11           Start Time: 0900  Stop Time: 0945  Total time in clinic (min): 45 minutes    Subjective: Shraddha reports that things are feeling much better    ( service utilized throughout duration of today's session)    Objective: See treatment diary below      Assessment: Tolerated treatment well. Patient demonstrated fatigue post treatment, exhibited good technique with therapeutic exercises, and would benefit from continued PT. Patient continues to respond well  to current program. Progressions made in repetitions/resistance for LE strengthening today as well. Improved endurance with less rest breaks today throughout session, and fewer instances of knee soreness with quad based strengthening. Plan to continue progressing as tolerated.      Plan: Continue per plan of care.      Precautions: N/A      Manuals 4/5                                                                Neuro Re-Ed             Standing Hip ABD             Clamshells vs TB 3x10 (RTB)            STS + Hinge Cues 3x10            Monster Walk vs TB 2 Laps (RTB)            SLS Balance 3x30\"ea            Bridges + Eccentric Lower 3x10 (GTB)            Waddle Walk Isos 3x30\" (GTB)            Ther Ex             Heel Slides             Seated HS Stretch             Figure 4 Stretch Supine             Leg Press 2x10 (#85)            Rec Bike 10min                                                   Ther Activity                                       Gait Training                                       Modalities                                            "

## 2024-04-12 ENCOUNTER — OFFICE VISIT (OUTPATIENT)
Dept: PHYSICAL THERAPY | Facility: REHABILITATION | Age: 74
End: 2024-04-12
Payer: MEDICARE

## 2024-04-12 DIAGNOSIS — M17.11 OSTEOARTHRITIS OF RIGHT KNEE, UNSPECIFIED OSTEOARTHRITIS TYPE: Primary | ICD-10-CM

## 2024-04-12 PROCEDURE — 97110 THERAPEUTIC EXERCISES: CPT

## 2024-04-12 PROCEDURE — 97112 NEUROMUSCULAR REEDUCATION: CPT

## 2024-04-12 NOTE — PROGRESS NOTES
"Daily Note     Today's date: 2024  Patient name: Shraddha Frazier  : 1950  MRN: [id number]  Referring provider: Rebeka Mendenhall, *  Dx:   Encounter Diagnosis     ICD-10-CM    1. Osteoarthritis of right knee, unspecified osteoarthritis type  M17.11                      Subjective: Shraddha reports that things are a little better but the pain still persists.  Believes that it is due to standing for 8hr shifts at her job.  Notes no increase in pain from last sessions exercise progressions.    ( service utilized throughout duration of today's session)    Objective: See treatment diary below      Assessment: Tolerated treatment well. Patient demonstrated fatigue post treatment, exhibited good technique with therapeutic exercises, and would benefit from continued PT. Patient continues to respond well  to current program. Progressions made in repetitions/resistance for LE strengthening today as well. Improved endurance with less rest breaks today throughout session, and fewer instances of knee soreness with quad based strengthening. Plan to continue progressing as tolerated.      Plan: Continue per plan of care.      Precautions: N/A      Manuals                                                                Neuro Re-Ed             Standing Hip ABD             Clamshells vs TB 3x10 (RTB) 3x10 (RTB)           STS + Hinge Cues 3x10 3x10           Monster Walk vs TB 2 Laps (RTB) 2 Laps (RTB)           SLS Balance 3x30\"ea 3x30\"ea (+airex)           Bridges + Eccentric Lower 3x10 (GTB) 3x10 (MARTY)           Waddle Walk Isos 3x30\" (GTB) 6 Laps Lateral Walk (GTB)           Ther Ex             Heel Slides             Seated HS Stretch             Figure 4 Stretch Supine             Leg Press 2x10 (#85) 3x10 (#95)           Rec Bike 10min 10min (lvl 2)                                                  Ther Activity                                       Gait Training                             "           Modalities

## 2024-04-19 ENCOUNTER — OFFICE VISIT (OUTPATIENT)
Dept: PHYSICAL THERAPY | Facility: REHABILITATION | Age: 74
End: 2024-04-19
Payer: MEDICARE

## 2024-04-19 DIAGNOSIS — M17.11 OSTEOARTHRITIS OF RIGHT KNEE, UNSPECIFIED OSTEOARTHRITIS TYPE: Primary | ICD-10-CM

## 2024-04-19 PROCEDURE — 97112 NEUROMUSCULAR REEDUCATION: CPT

## 2024-04-19 PROCEDURE — 97110 THERAPEUTIC EXERCISES: CPT

## 2024-04-23 ENCOUNTER — OFFICE VISIT (OUTPATIENT)
Dept: FAMILY MEDICINE CLINIC | Facility: CLINIC | Age: 74
End: 2024-04-23

## 2024-04-23 VITALS
RESPIRATION RATE: 18 BRPM | DIASTOLIC BLOOD PRESSURE: 80 MMHG | OXYGEN SATURATION: 98 % | TEMPERATURE: 97.6 F | BODY MASS INDEX: 28.34 KG/M2 | SYSTOLIC BLOOD PRESSURE: 130 MMHG | HEART RATE: 83 BPM | WEIGHT: 166 LBS | HEIGHT: 64 IN

## 2024-04-23 DIAGNOSIS — M17.11 PRIMARY OSTEOARTHRITIS OF RIGHT KNEE: Primary | ICD-10-CM

## 2024-04-23 DIAGNOSIS — I10 ESSENTIAL HYPERTENSION: ICD-10-CM

## 2024-04-23 PROCEDURE — G2211 COMPLEX E/M VISIT ADD ON: HCPCS

## 2024-04-23 PROCEDURE — 99214 OFFICE O/P EST MOD 30 MIN: CPT

## 2024-04-23 RX ORDER — FAMOTIDINE 20 MG/1
20 TABLET, FILM COATED ORAL 2 TIMES DAILY
Qty: 90 TABLET | Refills: 0 | Status: SHIPPED | OUTPATIENT
Start: 2024-04-23

## 2024-04-23 RX ORDER — BACLOFEN 10 MG/1
10 TABLET ORAL 3 TIMES DAILY PRN
Qty: 30 TABLET | Refills: 0 | Status: SHIPPED | OUTPATIENT
Start: 2024-04-23

## 2024-04-23 NOTE — ASSESSMENT & PLAN NOTE
BP Readings from Last 3 Encounters:   04/23/24 130/80   03/18/24 130/72   03/07/24 150/74     - At goal. Continue Losartan 100 mg & amlodipine 10 mg daily.

## 2024-04-23 NOTE — PROGRESS NOTES
Name: Shraddha Frazier      : 1950      MRN: [id number]  Encounter Provider: MINDY Teague  Encounter Date: 2024   Encounter department: [facility]    Assessment & Plan     1. Primary osteoarthritis of right knee  Assessment & Plan:  Rx a short course of diclofenac 50 mg BID. May use TID if BID is ineffective. Take with pepcid. Baclofen prn. Follow-up with ortho. Advised pt that she may benefit from a different type of intra-articular injection.    Orders:  -     diclofenac sodium (VOLTAREN) 50 mg EC tablet; Take 1 tablet (50 mg total) by mouth 2 (two) times a day  -     famotidine (PEPCID) 20 mg tablet; Take 1 tablet (20 mg total) by mouth 2 (two) times a day  -     baclofen 10 mg tablet; Take 1 tablet (10 mg total) by mouth 3 (three) times a day as needed (pain)    2. Essential hypertension  Assessment & Plan:  BP Readings from Last 3 Encounters:   24 130/80   24 130/72   24 150/74     - At goal. Continue Losartan 100 mg & amlodipine 10 mg daily.           Depression Screening and Follow-up Plan: Patient was screened for depression during today's encounter. They screened negative with a PHQ-2 score of 0.        Subjective      Shraddha Frazier is a 73 y.o. female  has a past medical history of Chronic cough, Decreased hearing, left, Echocardiogram abnormal, History of mammogram, History of mammogram, HTN (hypertension), Osteopenia determined by x-ray, Primary osteoarthritis of left knee, and Primary osteoarthritis of right knee.  has a past surgical history that includes Hysterectomy and Bladder suspension.    She presents today for a f/u of right knee pain/OA. She receivied a cortisone injection by ortho in February which she reports is ineffective. She is doing PT. Has f/u ortho in about 2 weeks. She has been using diclofenac prn which has been somewhat effective. Naproxen and motrin were not as effective. She is also taking  tylenol prn which is minimally effective. She reports that diclofenac causes some GI upset. However, it is the only medicine that allows her to sleep with the pain.       Review of Systems   Constitutional:  Negative for chills and fever.   HENT:  Negative for ear pain and sore throat.    Eyes:  Negative for pain and visual disturbance.   Respiratory:  Negative for cough and shortness of breath.    Cardiovascular:  Negative for chest pain and palpitations.   Gastrointestinal:  Negative for abdominal pain and vomiting.   Genitourinary:  Negative for dysuria and hematuria.   Musculoskeletal:  Positive for arthralgias. Negative for back pain.   Skin:  Negative for color change and rash.   Neurological:  Negative for seizures and syncope.   All other systems reviewed and are negative.      Current Outpatient Medications on File Prior to Visit   Medication Sig    acetaminophen (TYLENOL) 650 mg CR tablet Take 1 tablet (650 mg total) by mouth every 8 (eight) hours as needed for mild pain    alendronate (FOSAMAX) 70 mg tablet TAKE ONE TABLET BY MOUTH ONCE WEEKLY    amLODIPine (NORVASC) 10 mg tablet Take 1 tablet (10 mg total) by mouth daily    atorvastatin (LIPITOR) 40 mg tablet Take 1 tablet (40 mg total) by mouth daily    Calcium Carb-Cholecalciferol 600-10 MG-MCG TABS Take 1 tablet by mouth daily    cholecalciferol (VITAMIN D3) 1,000 units tablet Take 2 tablets (2,000 Units total) by mouth daily    Diclofenac Sodium (VOLTAREN) 1 % Apply 2 g topically 4 (four) times a day    Ketotifen Fumarate (ZADITOR) 0.035 % ophthalmic solution Apply 1 drop to eye 2 (two) times a day    lidocaine (Lidoderm) 5 % Apply 1 patch topically over 12 hours daily Remove & Discard patch within 12 hours or as directed by MD    losartan (COZAAR) 100 MG tablet Take 1 tablet (100 mg total) by mouth daily    [DISCONTINUED] cyclobenzaprine (FLEXERIL) 10 mg tablet Take 1 tablet (10 mg total) by mouth 2 (two) times a day as needed for muscle spasms     "[DISCONTINUED] naproxen (Naprosyn) 500 mg tablet Take 1 tablet (500 mg total) by mouth 2 (two) times a day with meals       Objective     /80 (BP Location: Left arm, Patient Position: Sitting, Cuff Size: Large)   Pulse 83   Temp 97.6 °F (36.4 °C) (Temporal)   Resp 18   Ht 5' 4\" (1.626 m)   Wt 75.3 kg (166 lb)   LMP  (LMP Unknown)   SpO2 98%   BMI 28.49 kg/m²     Physical Exam  Vitals and nursing note reviewed.   HENT:      Head: Normocephalic and atraumatic.      Right Ear: External ear normal.      Left Ear: External ear normal.      Nose: Nose normal.   Eyes:      Extraocular Movements: Extraocular movements intact.      Conjunctiva/sclera: Conjunctivae normal.      Pupils: Pupils are equal, round, and reactive to light.   Cardiovascular:      Rate and Rhythm: Normal rate and regular rhythm.      Pulses: Normal pulses.      Heart sounds: Normal heart sounds.   Pulmonary:      Effort: Pulmonary effort is normal.      Breath sounds: Normal breath sounds.   Abdominal:      General: Bowel sounds are normal.      Palpations: Abdomen is soft.      Tenderness: There is no abdominal tenderness.   Musculoskeletal:         General: Normal range of motion.      Cervical back: Normal range of motion.      Right knee: Swelling present. Tenderness present.   Skin:     General: Skin is warm and dry.   Neurological:      General: No focal deficit present.      Mental Status: She is alert and oriented to person, place, and time. Mental status is at baseline.   Psychiatric:         Mood and Affect: Mood normal.         Behavior: Behavior normal.         Thought Content: Thought content normal.       MINDY Teague    "

## 2024-04-23 NOTE — ASSESSMENT & PLAN NOTE
Rx a short course of diclofenac 50 mg BID. May use TID if BID is ineffective. Take with pepcid. Baclofen prn. Follow-up with ortho. Advised pt that she may benefit from a different type of intra-articular injection.

## 2024-04-26 ENCOUNTER — APPOINTMENT (OUTPATIENT)
Dept: PHYSICAL THERAPY | Facility: REHABILITATION | Age: 74
End: 2024-04-26
Payer: MEDICARE

## 2024-05-06 ENCOUNTER — OFFICE VISIT (OUTPATIENT)
Dept: OBGYN CLINIC | Facility: CLINIC | Age: 74
End: 2024-05-06
Payer: MEDICARE

## 2024-05-06 VITALS
WEIGHT: 166 LBS | HEIGHT: 64 IN | DIASTOLIC BLOOD PRESSURE: 68 MMHG | SYSTOLIC BLOOD PRESSURE: 130 MMHG | BODY MASS INDEX: 28.34 KG/M2

## 2024-05-06 DIAGNOSIS — M17.11 PRIMARY OSTEOARTHRITIS OF RIGHT KNEE: Primary | ICD-10-CM

## 2024-05-06 PROCEDURE — 99213 OFFICE O/P EST LOW 20 MIN: CPT | Performed by: PHYSICIAN ASSISTANT

## 2024-05-06 NOTE — PROGRESS NOTES
"Patient Name:  Shraddha Frazier  MRN:  [id number]    Assessment & Plan     Right knee DJD.  Patient notes minimal improvement after undergoing right knee intra-articular 2/6/2024.  She does however note overall significant proved and since initiating Voltaren tablets and physical therapy.  Currently her pain is minimal and rated 2 out of 10 intensity.  She is happy with her progress thus far.  Continue activities as tolerated modification avoid pain.  Follow-up as needed.  Briefly discussed repeat injection in the future if pain worsens.  Also discussed hyaluronic acid injections in the future as well.     utilized during this encounter.    Chief Complaint     Right knee pain    History of the Present Illness     Shraddha Frazier is a 73 y.o. female who reports to the office today for evaluation of her right knee.  She was last seen on 2/6/2024.  At that time she received a corticosteroid injection into the right knee.  She noted minimal improvement after the injection.  She was seen shortly after by her PCP who prescribed Voltaren tablets and also physical therapy.  Since initiating the medication and physical therapy she does note overall significant improvement.  Currently pain is minimal at 2 out of 10 intensity.  Pain is worse with increased activity and prolonged standing.  She did note some swelling initially which has since resolved.  She notes mild stiffness.  She denies any weakness or instability.  No numbness or tingling.  No fevers or chills.  She is happy with her progress and result.    Physical Exam     /68   Ht 5' 4\" (1.626 m)   Wt 75.3 kg (166 lb)   LMP  (LMP Unknown)   BMI 28.49 kg/m²     Right knee: No gross deformity. Skin intact. No erythema ecchymosis or swelling. No effusion. Range of motion is full extension and flexion to 120 degrees without pain. There is crepitation appreciated with passive range of motion. Stable to varus and valgus stress without " pain. Stable Lachman test. Negative posterior drawer test. Negative Mandeep's test. Extensor mechanism is intact.     Eyes: Anicteric sclerae.  ENT: Trachea midline.  Lungs: Normal respiratory effort.  CV: Capillary refill is less than 2 seconds.  Skin: Intact without erythema.  Lymph: No palpable lymphadenopathy.  Neuro: Sensation is grossly intact to light touch.  Psych: Mood and affect are appropriate.    Past Medical History:   Diagnosis Date    Chronic cough     Decreased hearing, left 05/02/2019    Echocardiogram abnormal 02/01/2016    Mild LVH with normal systolic function EF > 70%. Grade I Diastolic dysfunction. Aortic sclerosis w/o significant stenosis. Mild insufficiency. Mild MR. Mild TR with normal pulmonary artery pressure.     History of mammogram 09/13/2017    Benign    History of mammogram 02/15/2016    Benign    HTN (hypertension)     Osteopenia determined by x-ray 02/03/2016    osteopenia of the left femoral neck and normal bone marrow density of the left hip total. Osteopenia of the lumbar spine.     Primary osteoarthritis of left knee 06/08/2023    Primary osteoarthritis of right knee 08/07/2020       Past Surgical History:   Procedure Laterality Date    BLADDER SUSPENSION      HYSTERECTOMY      age 45       Allergies   Allergen Reactions    Aspirin     Penicillins Other (See Comments) and Rash       Current Outpatient Medications on File Prior to Visit   Medication Sig Dispense Refill    acetaminophen (TYLENOL) 650 mg CR tablet Take 1 tablet (650 mg total) by mouth every 8 (eight) hours as needed for mild pain 30 tablet 0    alendronate (FOSAMAX) 70 mg tablet TAKE ONE TABLET BY MOUTH ONCE WEEKLY 12 tablet 0    amLODIPine (NORVASC) 10 mg tablet Take 1 tablet (10 mg total) by mouth daily 90 tablet 3    atorvastatin (LIPITOR) 40 mg tablet Take 1 tablet (40 mg total) by mouth daily 90 tablet 1    baclofen 10 mg tablet Take 1 tablet (10 mg total) by mouth 3 (three) times a day as needed (pain) 30  tablet 0    Calcium Carb-Cholecalciferol 600-10 MG-MCG TABS Take 1 tablet by mouth daily 90 tablet 0    cholecalciferol (VITAMIN D3) 1,000 units tablet Take 2 tablets (2,000 Units total) by mouth daily 180 tablet 3    Diclofenac Sodium (VOLTAREN) 1 % Apply 2 g topically 4 (four) times a day 100 g 1    diclofenac sodium (VOLTAREN) 50 mg EC tablet Take 1 tablet (50 mg total) by mouth 2 (two) times a day 35 tablet 0    famotidine (PEPCID) 20 mg tablet Take 1 tablet (20 mg total) by mouth 2 (two) times a day 90 tablet 0    Ketotifen Fumarate (ZADITOR) 0.035 % ophthalmic solution Apply 1 drop to eye 2 (two) times a day      lidocaine (Lidoderm) 5 % Apply 1 patch topically over 12 hours daily Remove & Discard patch within 12 hours or as directed by MD 6 patch 0    losartan (COZAAR) 100 MG tablet Take 1 tablet (100 mg total) by mouth daily 90 tablet 3     No current facility-administered medications on file prior to visit.       Social History     Tobacco Use    Smoking status: Never     Passive exposure: Never    Smokeless tobacco: Never   Vaping Use    Vaping status: Never Used   Substance Use Topics    Alcohol use: Not Currently     Alcohol/week: 1.0 standard drink of alcohol     Types: 1 Glasses of wine per week    Drug use: Never       Family History   Problem Relation Age of Onset    No Known Problems Mother     No Known Problems Father     No Known Problems Sister     No Known Problems Daughter     No Known Problems Maternal Grandmother     No Known Problems Maternal Grandfather     No Known Problems Paternal Grandmother     No Known Problems Paternal Grandfather     No Known Problems Maternal Aunt     No Known Problems Paternal Aunt     No Known Problems Paternal Aunt     No Known Problems Paternal Aunt     No Known Problems Paternal Aunt     No Known Problems Paternal Aunt        Review of Systems     As stated in the HPI. All other systems reviewed and are negative.

## 2024-05-08 ENCOUNTER — EVALUATION (OUTPATIENT)
Dept: PHYSICAL THERAPY | Facility: REHABILITATION | Age: 74
End: 2024-05-08
Payer: MEDICARE

## 2024-05-08 DIAGNOSIS — M17.11 OSTEOARTHRITIS OF RIGHT KNEE, UNSPECIFIED OSTEOARTHRITIS TYPE: Primary | ICD-10-CM

## 2024-05-08 PROCEDURE — 97112 NEUROMUSCULAR REEDUCATION: CPT

## 2024-05-08 PROCEDURE — 97110 THERAPEUTIC EXERCISES: CPT

## 2024-05-10 ENCOUNTER — PATIENT OUTREACH (OUTPATIENT)
Dept: FAMILY MEDICINE CLINIC | Facility: CLINIC | Age: 74
End: 2024-05-10

## 2024-05-10 NOTE — PROGRESS NOTES
KIMBERLY BAUMANN did receive a message from  staff stating that Pt called to the office and inquired to speak with a KIMBERLY BAUMANN. Per chart review there is no KIMBERLY BAUMANN involved in the case at this time. KIMBERLY BAUMANN replied to staff and informed that KIMBERLY BAUMANN will call Pt to assist as needed.     After chart review KIMBERLY BAUMANN did place a call to Pt to assist as needed. KIMBERLY BAUMANN introduced herself, her role, and assisted Pt in Nepali. Pt expressed that she is having issues with her insurance. Pt stated that she has medicare and also had medicaid, however, MA was stopped due to she works and high income. Pt expressed that she needs to work to be financially stable. Pt stated that even she works it's not enough to buy food and pays rent. KIMBERLY BAUMANN inquired Pt if she is aware about food bank. Pt expressed that she heard about food bank, however, she does not have the information. KIMBERLY BAUMANN explained Pt that [facility] and also, will send the resources to her by mail. Pt agreed.     KIMBERLY BAUMANN inquired Pt if there is any other concerns or social needs that she would like to share. Pt denied other social needs at this time. KIMBERLY BAUMANN informed Pt that she can write KIMBERLY BAUMANN phone number and name in order to contact KIMBERLY BAUMANN for further support as needed. KIMBERLY BAUMANN is available Monday through Friday within office hours. Pt seems understanding and thankful for the KIMBERLY BAUMANN assistance.    Patient was referred on [facility] to    [facility] [place]  [address]  [place] [address]   [phone]    [facility] [place]  [address]  [place] [address]   [phone]    [facility] [place]  [address]  [place] [address]   [phone]    [facility]  [address]  [place] [address]   [phone]    [employer]  [address]  [place] [address]    [phone]    [facility]  [address] [place] [address]   [phone]    [facility]  [address] [address]   [phone]    [facility]  [address] [place] [address]   [phone]    [facility] is remain available for further assistance as needed.

## 2024-05-17 ENCOUNTER — APPOINTMENT (OUTPATIENT)
Dept: PHYSICAL THERAPY | Facility: REHABILITATION | Age: 74
End: 2024-05-17
Payer: MEDICARE

## 2024-05-20 ENCOUNTER — PATIENT OUTREACH (OUTPATIENT)
Dept: FAMILY MEDICINE CLINIC | Facility: CLINIC | Age: 74
End: 2024-05-20

## 2024-05-20 NOTE — PROGRESS NOTES
Incoming call.    KIMBERLY BAUMANN did receive a call from Pt. Pt expressed that she was at her eye doctor appointment few days ago, the eye drop that doctor prescribed to her it's to expensive and her insurance does not cover it. Pt expressed that she would like to apply again for MA since this insurance pays for her medications. KIMBERLY BAUMANN did check in [facility] the price of the eye drop (Cyclosporine) there are generic eye drops a better price. KIMBERLY BAUMANN encouraged Pt to inquire her doctor if can prescribes another eye drops. Also, KIMBERLY BAUMANN explained Pt that she can come to our clinic and speaks with the Financial Counselor to assure if she qualifies for MA. Pt stated that she will come to the clinic and would like to talk with KIMBERLY BAUMANN in person as well. KIMBERLY BAUMANN provided Pt with an appointment on 5/28/2024 at 10:00 am. Pt is aware that she can ask for KIMBERLY BAUMANN once she arrives at the clinic.     KIMBERLY BAUMANN is remain available for further assistance as needed.

## 2024-05-23 ENCOUNTER — PATIENT OUTREACH (OUTPATIENT)
Dept: FAMILY MEDICINE CLINIC | Facility: CLINIC | Age: 74
End: 2024-05-23

## 2024-05-23 NOTE — PROGRESS NOTES
KIMBERLY BAUMANN did call Pt to reschedule an appointment. Pt seems understanding and agreed to meet up with KIMBERLY BAUMANN 5/29/2024 at 10:00 am at the clinic. Pt expressed thanked to KIMBERLY BAUMANN for her support.    KIMBERLY BAUMANN is remain available for further assistance as needed.

## 2024-05-24 ENCOUNTER — APPOINTMENT (OUTPATIENT)
Dept: PHYSICAL THERAPY | Facility: REHABILITATION | Age: 74
End: 2024-05-24
Payer: MEDICARE

## 2024-05-26 ENCOUNTER — APPOINTMENT (EMERGENCY)
Dept: RADIOLOGY | Facility: HOSPITAL | Age: 74
End: 2024-05-26
Payer: MEDICARE

## 2024-05-26 ENCOUNTER — HOSPITAL ENCOUNTER (EMERGENCY)
Facility: HOSPITAL | Age: 74
Discharge: HOME | End: 2024-05-26
Attending: INTERNAL MEDICINE
Payer: MEDICARE

## 2024-05-26 VITALS
HEART RATE: 72 BPM | WEIGHT: 167.2 LBS | SYSTOLIC BLOOD PRESSURE: 183 MMHG | OXYGEN SATURATION: 96 % | RESPIRATION RATE: 18 BRPM | BODY MASS INDEX: 28.7 KG/M2 | TEMPERATURE: 98.5 F | DIASTOLIC BLOOD PRESSURE: 98 MMHG

## 2024-05-26 DIAGNOSIS — M25.511 ACUTE PAIN OF RIGHT SHOULDER: Primary | ICD-10-CM

## 2024-05-26 PROCEDURE — 99283 EMERGENCY DEPT VISIT LOW MDM: CPT

## 2024-05-26 PROCEDURE — 99284 EMERGENCY DEPT VISIT MOD MDM: CPT | Performed by: PHYSICIAN ASSISTANT

## 2024-05-26 PROCEDURE — 73030 X-RAY EXAM OF SHOULDER: CPT

## 2024-05-26 RX ORDER — METHOCARBAMOL 500 MG/1
500 TABLET, FILM COATED ORAL 2 TIMES DAILY
Qty: 20 TABLET | Refills: 0 | Status: SHIPPED | OUTPATIENT
Start: 2024-05-26

## 2024-05-26 RX ORDER — OLOPATADINE HYDROCHLORIDE 1 MG/ML
1 SOLUTION/ DROPS OPHTHALMIC 2 TIMES DAILY
COMMUNITY
Start: 2024-04-16 | End: 2025-04-16

## 2024-05-26 RX ORDER — METHYLPREDNISOLONE 4 MG/1
TABLET ORAL
Qty: 21 TABLET | Refills: 0 | Status: SHIPPED | OUTPATIENT
Start: 2024-05-26

## 2024-05-26 RX ORDER — FLUOROMETHOLONE 2.5 MG/ML
SUSPENSION/ DROPS OPHTHALMIC
COMMUNITY
Start: 2024-04-16

## 2024-05-26 RX ORDER — CYCLOSPORINE 0.5 MG/ML
1 EMULSION OPHTHALMIC EVERY 12 HOURS
COMMUNITY
Start: 2024-05-16 | End: 2024-08-14

## 2024-05-26 NOTE — ED PROVIDER NOTES
History  Chief Complaint   Patient presents with    Shoulder Pain     Right shoulder pain for 1 weeks reports she does repetitive movements but this week had excessive use of her right arm. Using diclofenac 50mg once a day with some lidocaine patches. She stopped using diclofenac on Friday, she also didn't take he BP meds today. Requesting paper script if she receives anything today.     73-year-old female presents emergency department with complaints of right-sided shoulder pain.  States that she has had pain in the area over the past week.  Notes the pain is worse with movement.  Denies any injury to the area but states that she does do a job with repetitive motion.  She is left-handed.  Currently taking diclofenac and using topical pain relief without improvement of symptoms.       used: Yes ([id number])    Shoulder Pain  Location:  Shoulder  Shoulder location:  R shoulder  Pain details:     Quality:  Aching and tingling    Severity:  Mild    Duration:  1 week    Timing:  Constant    Progression:  Waxing and waning  Handedness:  Left-handed  Prior injury to area:  No  Relieved by:  Rest  Ineffective treatments:  NSAIDs  Associated symptoms: decreased range of motion    Associated symptoms: no back pain, no fatigue, no fever, no muscle weakness, no neck pain, no numbness, no stiffness, no swelling and no tingling        Prior to Admission Medications   Prescriptions Last Dose Informant Patient Reported? Taking?   Calcium Carb-Cholecalciferol 600-10 MG-MCG TABS   No No   Sig: Take 1 tablet by mouth daily   Diclofenac Sodium (VOLTAREN) 1 %   No No   Sig: Apply 2 g topically 4 (four) times a day   FML Forte 0.25 % ophthalmic suspension   Yes Yes   Ketotifen Fumarate (ZADITOR) 0.035 % ophthalmic solution   Yes No   Sig: Apply 1 drop to eye 2 (two) times a day   acetaminophen (TYLENOL) 650 mg CR tablet   No No   Sig: Take 1 tablet (650 mg total) by mouth every 8 (eight) hours as needed for mild pain    alendronate (FOSAMAX) 70 mg tablet   No No   Sig: TAKE ONE TABLET BY MOUTH ONCE WEEKLY   amLODIPine (NORVASC) 10 mg tablet   No No   Sig: Take 1 tablet (10 mg total) by mouth daily   atorvastatin (LIPITOR) 40 mg tablet   No No   Sig: Take 1 tablet (40 mg total) by mouth daily   baclofen 10 mg tablet   No No   Sig: Take 1 tablet (10 mg total) by mouth 3 (three) times a day as needed (pain)   cholecalciferol (VITAMIN D3) 1,000 units tablet   No No   Sig: Take 2 tablets (2,000 Units total) by mouth daily   cycloSPORINE (RESTASIS) 0.05 % ophthalmic emulsion   Yes Yes   Sig: Apply 1 drop to eye every 12 (twelve) hours   diclofenac sodium (VOLTAREN) 50 mg EC tablet   No No   Sig: Take 1 tablet (50 mg total) by mouth 2 (two) times a day   famotidine (PEPCID) 20 mg tablet   No No   Sig: Take 1 tablet (20 mg total) by mouth 2 (two) times a day   lidocaine (Lidoderm) 5 %   No No   Sig: Apply 1 patch topically over 12 hours daily Remove & Discard patch within 12 hours or as directed by MD   losartan (COZAAR) 100 MG tablet   No No   Sig: Take 1 tablet (100 mg total) by mouth daily   olopatadine (PATANOL) 0.1 % ophthalmic solution   Yes Yes   Sig: Apply 1 drop to eye 2 (two) times a day      Facility-Administered Medications: None       Past Medical History:   Diagnosis Date    Chronic cough     Decreased hearing, left 05/02/2019    Echocardiogram abnormal 02/01/2016    Mild LVH with normal systolic function EF > 70%. Grade I Diastolic dysfunction. Aortic sclerosis w/o significant stenosis. Mild insufficiency. Mild MR. Mild TR with normal pulmonary artery pressure.     History of mammogram 09/13/2017    Benign    History of mammogram 02/15/2016    Benign    HTN (hypertension)     Osteopenia determined by x-ray 02/03/2016    osteopenia of the left femoral neck and normal bone marrow density of the left hip total. Osteopenia of the lumbar spine.     Primary osteoarthritis of left knee 06/08/2023    Primary osteoarthritis of  right knee 08/07/2020       Past Surgical History:   Procedure Laterality Date    BLADDER SUSPENSION      HYSTERECTOMY      age 45       Family History   Problem Relation Age of Onset    No Known Problems Mother     No Known Problems Father     No Known Problems Sister     No Known Problems Daughter     No Known Problems Maternal Grandmother     No Known Problems Maternal Grandfather     No Known Problems Paternal Grandmother     No Known Problems Paternal Grandfather     No Known Problems Maternal Aunt     No Known Problems Paternal Aunt     No Known Problems Paternal Aunt     No Known Problems Paternal Aunt     No Known Problems Paternal Aunt     No Known Problems Paternal Aunt      I have reviewed and agree with the history as documented.    E-Cigarette/Vaping    E-Cigarette Use Never User      E-Cigarette/Vaping Substances    Nicotine No     THC No     CBD No     Flavoring No     Other No     Unknown No      Social History     Tobacco Use    Smoking status: Never     Passive exposure: Never    Smokeless tobacco: Never   Vaping Use    Vaping status: Never Used   Substance Use Topics    Alcohol use: Not Currently     Alcohol/week: 1.0 standard drink of alcohol     Types: 1 Glasses of wine per week    Drug use: Never       Review of Systems   Constitutional:  Negative for chills, fatigue and fever.   HENT:  Negative for congestion, dental problem and sore throat.    Respiratory:  Negative for cough.    Cardiovascular:  Negative for chest pain.   Gastrointestinal:  Negative for abdominal pain.   Musculoskeletal:  Positive for arthralgias (shoulder pain). Negative for back pain, neck pain and stiffness.   Skin:  Negative for rash and wound.   All other systems reviewed and are negative.      Physical Exam  Physical Exam  Vitals and nursing note reviewed.   Constitutional:       Appearance: She is well-developed.   HENT:      Head: Normocephalic and atraumatic.   Cardiovascular:      Rate and Rhythm: Normal rate.    Pulmonary:      Effort: Pulmonary effort is normal. No respiratory distress.   Chest:      Chest wall: No tenderness.   Musculoskeletal:      Right shoulder: Tenderness present. No swelling, deformity, effusion, laceration, bony tenderness or crepitus. Decreased range of motion. Decreased strength. Normal pulse.   Skin:     General: Skin is warm and dry.   Neurological:      Mental Status: She is alert and oriented to person, place, and time.   Psychiatric:         Mood and Affect: Mood normal.         Behavior: Behavior normal.         Vital Signs  ED Triage Vitals [05/26/24 1028]   Temperature Pulse Respirations Blood Pressure SpO2   98.5 °F (36.9 °C) 72 18 (!) 183/98 96 %      Temp Source Heart Rate Source Patient Position - Orthostatic VS BP Location FiO2 (%)   Oral Monitor Sitting Left arm --      Pain Score       --           Vitals:    05/26/24 1028   BP: (!) 183/98   Pulse: 72   Patient Position - Orthostatic VS: Sitting         Visual Acuity      ED Medications  Medications - No data to display    Diagnostic Studies  Results Reviewed       None                   XR shoulder 2+ views RIGHT   ED Interpretation by Carmen Harding PA-C (05/26 1112)   Normal xray of the right shoulder                  Procedures  Procedures         ED Course                               SBIRT 22yo+      Flowsheet Row Most Recent Value   Initial Alcohol Screen: US AUDIT-C     1. How often do you have a drink containing alcohol? 1 Filed at: 05/26/2024 1039   2. How many drinks containing alcohol do you have on a typical day you are drinking?  0 Filed at: 05/26/2024 1039   3a. Male UNDER 65: How often do you have five or more drinks on one occasion? 0 Filed at: 05/26/2024 1039   3b. FEMALE Any Age, or MALE 65+: How often do you have 4 or more drinks on one occassion? 0 Filed at: 05/26/2024 1039   Audit-C Score 1 Filed at: 05/26/2024 1039   ALEXYS: How many times in the past year have you...    Used an illegal drug or used a  prescription medication for non-medical reasons? Never Filed at: 05/26/2024 1039                      Medical Decision Making  Differential diagnosis includes but not limited to: Arthritis, calcific tendinitis, shoulder strain, musculoskeletal pain, radicular pain    Problems Addressed:  Acute pain of right shoulder: acute illness or injury    Amount and/or Complexity of Data Reviewed  Radiology: ordered and independent interpretation performed. Decision-making details documented in ED Course.    Risk  Prescription drug management.             Disposition  Final diagnoses:   Acute pain of right shoulder     Time reflects when diagnosis was documented in both MDM as applicable and the Disposition within this note       Time User Action Codes Description Comment    5/26/2024 11:27 AM Carmen Harding Add [M25.511] Acute pain of right shoulder           ED Disposition       ED Disposition   Discharge    Condition   Stable    Date/Time   Sun May 26, 2024 1126    Comment   Shraddha Frazier discharge to home/self care.                   Follow-up Information       Follow up With Specialties Details Why Contact Info    [facility], Nurse Practitioner   [address] [place] [id number] [phone]              Discharge Medication List as of 5/26/2024 11:33 AM        START taking these medications    Details   methocarbamol (ROBAXIN) 500 mg tablet Take 1 tablet (500 mg total) by mouth 2 (two) times a day, Starting Sun 5/26/2024, Print      methylprednisolone (MEDROL) 4 mg tablet 6 tb po on day 1; 5 tb po on day 2; 4 tb po on day 3; 3 tb po on day 4; 2 tb po on day 5; 1 tb po on day 6, Print           CONTINUE these medications which have NOT CHANGED    Details   cycloSPORINE (RESTASIS) 0.05 % ophthalmic emulsion Apply 1 drop to eye every 12 (twelve) hours, Starting Thu 5/16/2024, Until Wed 8/14/2024, Historical Med      FML Forte 0.25 % ophthalmic suspension Historical Med       olopatadine (PATANOL) 0.1 % ophthalmic solution Apply 1 drop to eye 2 (two) times a day, Starting Tue 4/16/2024, Until Wed 4/16/2025, Historical Med      acetaminophen (TYLENOL) 650 mg CR tablet Take 1 tablet (650 mg total) by mouth every 8 (eight) hours as needed for mild pain, Starting Mon 2/5/2024, Normal      alendronate (FOSAMAX) 70 mg tablet TAKE ONE TABLET BY MOUTH ONCE WEEKLY, Starting Thu 1/4/2024, Normal      amLODIPine (NORVASC) 10 mg tablet Take 1 tablet (10 mg total) by mouth daily, Starting Mon 8/14/2023, Normal      atorvastatin (LIPITOR) 40 mg tablet Take 1 tablet (40 mg total) by mouth daily, Starting Mon 8/14/2023, Normal      baclofen 10 mg tablet Take 1 tablet (10 mg total) by mouth 3 (three) times a day as needed (pain), Starting Tue 4/23/2024, Normal      Calcium Carb-Cholecalciferol 600-10 MG-MCG TABS Take 1 tablet by mouth daily, Starting Mon 8/14/2023, Normal      cholecalciferol (VITAMIN D3) 1,000 units tablet Take 2 tablets (2,000 Units total) by mouth daily, Starting Mon 8/14/2023, Normal      Diclofenac Sodium (VOLTAREN) 1 % Apply 2 g topically 4 (four) times a day, Starting Mon 3/6/2023, Normal      diclofenac sodium (VOLTAREN) 50 mg EC tablet Take 1 tablet (50 mg total) by mouth 2 (two) times a day, Starting Tue 4/23/2024, Normal      famotidine (PEPCID) 20 mg tablet Take 1 tablet (20 mg total) by mouth 2 (two) times a day, Starting Tue 4/23/2024, Normal      Ketotifen Fumarate (ZADITOR) 0.035 % ophthalmic solution Apply 1 drop to eye 2 (two) times a day, Starting Mon 10/2/2023, Historical Med      lidocaine (Lidoderm) 5 % Apply 1 patch topically over 12 hours daily Remove & Discard patch within 12 hours or as directed by MD, Starting Tue 4/11/2023, Normal      losartan (COZAAR) 100 MG tablet Take 1 tablet (100 mg total) by mouth daily, Starting Mon 8/14/2023, Normal                 PDMP Review       None            ED Provider  Electronically Signed by             Carmen Harding,  MAAME  05/26/24 1231

## 2024-05-26 NOTE — Clinical Note
Shraddha Karin was seen and treated in our emergency department on 5/26/2024.                Diagnosis:     Candida  .    She may return on this date: 05/30/2024         If you have any questions or concerns, please don't hesitate to call.      Carmen Harding PA-C    ______________________________           _______________          _______________  Hospital Representative                              Date                                Time

## 2024-05-29 ENCOUNTER — PATIENT OUTREACH (OUTPATIENT)
Dept: FAMILY MEDICINE CLINIC | Facility: CLINIC | Age: 74
End: 2024-05-29

## 2024-05-29 NOTE — PROGRESS NOTES
KIMBERLY BAUMANN did receive an IB notification stating that Pt was at ED due to shoulder pain.    Addendum.    KIMBERLY BAUMANN did meet up with Pt at the clinic. KIMBERLY BAUMANN introduced herself and did bring the Pt to Financial Counselor (FC). Tim Taylor assisted Pt. Tim explained Pt that she does not qualify for MA at this time due to Pt works full time job and shows up high income. Tim encouraged Pt to talk with HR about the insurance and also, Pt was provided with a 's name that can guides Pt as well. Pt seems understanding and expressed thanked to KIMBERLY BAUMANN and Tim for their assistance.      KIMBERLY BAUMANN explained Pt that KIMBERLY BAUMANN will close this case today, however, she can reach out the KIMBERLY BAUMANN for further support Monday through Friday within office hours.     KIMBERLY BAUMANN is closing case today due to there is no social needs at this time.  KIMBERLY BAUMANN is remain available for further assistance as needed.

## 2024-05-31 ENCOUNTER — APPOINTMENT (OUTPATIENT)
Dept: PHYSICAL THERAPY | Facility: REHABILITATION | Age: 74
End: 2024-05-31
Payer: MEDICARE

## 2024-07-08 ENCOUNTER — TELEPHONE (OUTPATIENT)
Dept: FAMILY MEDICINE CLINIC | Facility: CLINIC | Age: 74
End: 2024-07-08

## 2024-07-08 DIAGNOSIS — I10 ESSENTIAL HYPERTENSION: ICD-10-CM

## 2024-07-08 DIAGNOSIS — E78.2 MIXED HYPERLIPIDEMIA: ICD-10-CM

## 2024-07-08 RX ORDER — ATORVASTATIN CALCIUM 40 MG/1
40 TABLET, FILM COATED ORAL DAILY
Qty: 90 TABLET | Refills: 1 | Status: SHIPPED | OUTPATIENT
Start: 2024-07-08

## 2024-07-08 RX ORDER — LOSARTAN POTASSIUM 100 MG/1
100 TABLET ORAL DAILY
Qty: 90 TABLET | Refills: 3 | Status: SHIPPED | OUTPATIENT
Start: 2024-07-08

## 2024-07-08 RX ORDER — AMLODIPINE BESYLATE 10 MG/1
10 TABLET ORAL DAILY
Qty: 90 TABLET | Refills: 3 | Status: SHIPPED | OUTPATIENT
Start: 2024-07-08

## 2024-07-08 NOTE — TELEPHONE ENCOUNTER
Pt came into office requesting medication refill for     atorvastatin (LIPITOR) 40 mg tablet      losartan (COZAAR) 100 MG tablet     amLODIPine (NORVASC) 10 mg tablet     Please send to pharmacy on file

## 2024-07-11 ENCOUNTER — RA CDI HCC (OUTPATIENT)
Dept: OTHER | Facility: HOSPITAL | Age: 74
End: 2024-07-11

## 2024-08-29 ENCOUNTER — HOSPITAL ENCOUNTER (EMERGENCY)
Facility: HOSPITAL | Age: 74
Discharge: HOME | End: 2024-08-29
Payer: MEDICARE

## 2024-08-29 VITALS
OXYGEN SATURATION: 94 % | DIASTOLIC BLOOD PRESSURE: 79 MMHG | TEMPERATURE: 98.2 F | RESPIRATION RATE: 20 BRPM | HEART RATE: 67 BPM | WEIGHT: 165.34 LBS | SYSTOLIC BLOOD PRESSURE: 143 MMHG | BODY MASS INDEX: 28.38 KG/M2

## 2024-08-29 DIAGNOSIS — M25.561 RECURRENT PAIN OF RIGHT KNEE: Primary | ICD-10-CM

## 2024-08-29 PROCEDURE — 96372 THER/PROPH/DIAG INJ SC/IM: CPT

## 2024-08-29 PROCEDURE — 99284 EMERGENCY DEPT VISIT MOD MDM: CPT

## 2024-08-29 PROCEDURE — 99283 EMERGENCY DEPT VISIT LOW MDM: CPT

## 2024-08-29 RX ORDER — KETOROLAC TROMETHAMINE 30 MG/ML
15 INJECTION, SOLUTION INTRAMUSCULAR; INTRAVENOUS ONCE
Status: COMPLETED | OUTPATIENT
Start: 2024-08-29 | End: 2024-08-29

## 2024-08-29 RX ORDER — LIDOCAINE 40 MG/G
CREAM TOPICAL 2 TIMES DAILY PRN
Qty: 120 G | Refills: 0 | Status: SHIPPED | OUTPATIENT
Start: 2024-08-29

## 2024-08-29 RX ADMIN — KETOROLAC TROMETHAMINE 15 MG: 30 INJECTION, SOLUTION INTRAMUSCULAR; INTRAVENOUS at 11:59

## 2024-08-29 NOTE — Clinical Note
Shraddha Karin was seen and treated in our emergency department on 8/29/2024.                Diagnosis:     Candida  may return to work on return date.    She may return on this date: 08/31/2024         If you have any questions or concerns, please don't hesitate to call.      Nick Mantilla MD    ______________________________           _______________          _______________  Hospital Representative                              Date                                Time

## 2024-08-29 NOTE — ED PROVIDER NOTES
History  Chief Complaint   Patient presents with    Knee Pain     Right knee pain X a few days. Denies trauma or any injury.      73-year-old female past medical history of right knee osteoarthritis, hypertension presents to ED c/o R knee pain.  Denies any trauma.  Denies recent illness.  Denies fever, chills, erythema, warmth, swelling, numbness, weakness, change in range of motion, discoloration.  Chest pain, shortness of breath.  Reports pain with bearing weight and ambulation slowing her down.  But is still able to.    States the diclofenac pills she was previously given don't work and that the injections given by orthopedics did not work so she has not taken anything for her symptoms. Has not followed up with PCP or Orthopedics.       History provided by:  Patient, medical records and relative   used: Yes ([facility] [employer])    Knee Pain  Location:  Knee  Injury: no    Knee location:  R knee  Pain details:     Quality:  Aching    Duration:  3 days    Timing:  Constant  Chronicity:  Recurrent  Prior injury to area:  No  Relieved by:  Nothing  Worsened by:  Bearing weight and activity  Ineffective treatments:  None tried  Associated symptoms: no back pain, no fatigue, no fever, no itching, no muscle weakness, no neck pain, no numbness, no swelling and no tingling  Decreased active range of motion: per baseline, slightly decreased flexion.      Prior to Admission Medications   Prescriptions Last Dose Informant Patient Reported? Taking?   Calcium Carb-Cholecalciferol 600-10 MG-MCG TABS   No No   Sig: Take 1 tablet by mouth daily   Diclofenac Sodium (VOLTAREN) 1 %   No No   Sig: Apply 2 g topically 4 (four) times a day   FML Forte 0.25 % ophthalmic suspension   Yes No   Ketotifen Fumarate (ZADITOR) 0.035 % ophthalmic solution   Yes No   Sig: Apply 1 drop to eye 2 (two) times a day   acetaminophen (TYLENOL) 650 mg CR tablet   No No   Sig: Take 1 tablet (650 mg total) by mouth every 8 (eight) hours as needed  for mild pain   alendronate (FOSAMAX) 70 mg tablet   No No   Sig: TAKE ONE TABLET BY MOUTH ONCE WEEKLY   amLODIPine (NORVASC) 10 mg tablet   No No   Sig: Take 1 tablet (10 mg total) by mouth daily   atorvastatin (LIPITOR) 40 mg tablet   No No   Sig: Take 1 tablet (40 mg total) by mouth daily   baclofen 10 mg tablet   No No   Sig: Take 1 tablet (10 mg total) by mouth 3 (three) times a day as needed (pain)   cholecalciferol (VITAMIN D3) 1,000 units tablet   No No   Sig: Take 2 tablets (2,000 Units total) by mouth daily   cycloSPORINE (RESTASIS) 0.05 % ophthalmic emulsion   Yes No   Sig: Apply 1 drop to eye every 12 (twelve) hours   diclofenac sodium (VOLTAREN) 50 mg EC tablet   No No   Sig: Take 1 tablet (50 mg total) by mouth 2 (two) times a day   famotidine (PEPCID) 20 mg tablet   No No   Sig: Take 1 tablet (20 mg total) by mouth 2 (two) times a day   lidocaine (Lidoderm) 5 %   No No   Sig: Apply 1 patch topically over 12 hours daily Remove & Discard patch within 12 hours or as directed by MD   losartan (COZAAR) 100 MG tablet   No No   Sig: Take 1 tablet (100 mg total) by mouth daily   methocarbamol (ROBAXIN) 500 mg tablet   No No   Sig: Take 1 tablet (500 mg total) by mouth 2 (two) times a day   methylprednisolone (MEDROL) 4 mg tablet   No No   Si tb po on day 1; 5 tb po on day 2; 4 tb po on day 3; 3 tb po on day 4; 2 tb po on day 5; 1 tb po on day 6   olopatadine (PATANOL) 0.1 % ophthalmic solution   Yes No   Sig: Apply 1 drop to eye 2 (two) times a day      Facility-Administered Medications: None       Past Medical History:   Diagnosis Date    Chronic cough     Decreased hearing, left 2019    Echocardiogram abnormal 2016    Mild LVH with normal systolic function EF > 70%. Grade I Diastolic dysfunction. Aortic sclerosis w/o significant stenosis. Mild insufficiency. Mild MR. Mild TR with normal pulmonary artery pressure.     History of mammogram 2017    Benign    History of mammogram  02/15/2016    Benign    HTN (hypertension)     Osteopenia determined by x-ray 02/03/2016    osteopenia of the left femoral neck and normal bone marrow density of the left hip total. Osteopenia of the lumbar spine.     Primary osteoarthritis of left knee 06/08/2023    Primary osteoarthritis of right knee 08/07/2020       Past Surgical History:   Procedure Laterality Date    BLADDER SUSPENSION      HYSTERECTOMY      age 45       Family History   Problem Relation Age of Onset    No Known Problems Mother     No Known Problems Father     No Known Problems Sister     No Known Problems Daughter     No Known Problems Maternal Grandmother     No Known Problems Maternal Grandfather     No Known Problems Paternal Grandmother     No Known Problems Paternal Grandfather     No Known Problems Maternal Aunt     No Known Problems Paternal Aunt     No Known Problems Paternal Aunt     No Known Problems Paternal Aunt     No Known Problems Paternal Aunt     No Known Problems Paternal Aunt      I have reviewed and agree with the history as documented.    E-Cigarette/Vaping    E-Cigarette Use Never User      E-Cigarette/Vaping Substances    Nicotine No     THC No     CBD No     Flavoring No     Other No     Unknown No      Social History     Tobacco Use    Smoking status: Never     Passive exposure: Never    Smokeless tobacco: Never   Vaping Use    Vaping status: Never Used   Substance Use Topics    Alcohol use: Not Currently     Alcohol/week: 1.0 standard drink of alcohol     Types: 1 Glasses of wine per week    Drug use: Never       Review of Systems   Constitutional:  Negative for chills, fatigue and fever.   Cardiovascular:  Negative for leg swelling.   Musculoskeletal:  Positive for arthralgias. Negative for back pain, joint swelling and neck pain.   Skin:  Negative for color change, itching, rash and wound.   Neurological:  Negative for weakness and numbness.   All other systems reviewed and are negative.      Physical  Exam  Physical Exam  Vitals and nursing note reviewed.   Constitutional:       General: She is not in acute distress.     Appearance: Normal appearance. She is not ill-appearing.   HENT:      Head: Normocephalic and atraumatic.   Cardiovascular:      Rate and Rhythm: Normal rate and regular rhythm.      Pulses:           Dorsalis pedis pulses are 2+ on the right side.        Posterior tibial pulses are 2+ on the right side.   Pulmonary:      Effort: Pulmonary effort is normal.   Musculoskeletal:      Cervical back: Neck supple.      Right knee: Bony tenderness present. No swelling or effusion. Normal range of motion. Tenderness present.      Right lower leg: No edema.      Left lower leg: No edema.      Comments: No unilateral leg swelling   Skin:     General: Skin is warm and dry.      Capillary Refill: Capillary refill takes less than 2 seconds.      Findings: No bruising, erythema or rash.   Neurological:      Mental Status: She is alert.      Sensory: No sensory deficit.      Motor: No weakness.      Gait: Gait normal.      Comments: Ambulates with steady gait, slowly    Psychiatric:         Mood and Affect: Mood normal.         Behavior: Behavior normal.         Vital Signs  ED Triage Vitals [08/29/24 1052]   Temperature Pulse Respirations Blood Pressure SpO2   98.2 °F (36.8 °C) 67 20 143/79 94 %      Temp Source Heart Rate Source Patient Position - Orthostatic VS BP Location FiO2 (%)   Oral Monitor Lying Right arm --      Pain Score       --           Vitals:    08/29/24 1052   BP: 143/79   Pulse: 67   Patient Position - Orthostatic VS: Lying         Visual Acuity      ED Medications  Medications   ketorolac (TORADOL) injection 15 mg (15 mg Intramuscular Given 8/29/24 1159)       Diagnostic Studies  Results Reviewed       None                   No orders to display              Procedures  Procedures         ED Course  ED Course as of 08/29/24 1300   Thu Aug 29, 2024   1143 No Signs or symptoms of septic  arthritis.   1143 Most recent egfr 92                                  SBIRT 20yo+      Flowsheet Row Most Recent Value   Initial Alcohol Screen: US AUDIT-C     1. How often do you have a drink containing alcohol? 0 Filed at: 08/29/2024 1052   2. How many drinks containing alcohol do you have on a typical day you are drinking?  0 Filed at: 08/29/2024 1052   3b. FEMALE Any Age, or MALE 65+: How often do you have 4 or more drinks on one occassion? 0 Filed at: 08/29/2024 1052   Audit-C Score 0 Filed at: 08/29/2024 1052   ALEXYS: How many times in the past year have you...    Used an illegal drug or used a prescription medication for non-medical reasons? Never Filed at: 08/29/2024 1052                      Medical Decision Making  Differential diagnosis includes but is not limited to osteoarthritis, septic arthritis.  Atraumatic right knee pain.  Also, concern for acute fracture.  Known osteoarthritis of right knee.  Not currently taking medications for it and has not seen orthopedics in 4 months.  No systemic symptoms.  No fevers.  No skin changes.  Range of motion intact. Low clinical concern for septic arthritis.  No overt indications for emergent arthrocentesis.  Neurovascularly intact.  Previous eGFR reviewed, within normal limits.  Plan for supportive care, Toradol injection given, walker as needed for ambulation.  Plan for PCP and orthopedic follow-up.    All imaging and/or lab testing discussed with patient, strict return to ED precautions discussed. Patient recommended to follow up promptly with appropriate outpatient provider and risk of morbidity/mortality if patient does not follow up as recommended was discussed. Patient and/or family members verbalizes understanding and agrees with plan. Patient and/or family members were given opportunity to ask questions, all questions were answered at this time. Patient is stable for discharge.     Portions of the record may have been created with voice recognition  "software. Occasional wrong word or \"sound a like\" substitutions may have occurred due to the inherent limitations of voice recognition software. Read the chart carefully and recognize, using context, where substitutions have occurred.       Risk  OTC drugs.  Prescription drug management.                 Disposition  Final diagnoses:   Recurrent pain of right knee     Time reflects when diagnosis was documented in both MDM as applicable and the Disposition within this note       Time User Action Codes Description Comment    8/29/2024 11:46 AM Merced Garsia Rj [M25.561] Recurrent pain of right knee           ED Disposition       ED Disposition   Discharge    Condition   Stable    Date/Time   Thu Aug 29, 2024 1146    Comment   Shraddha Karin discharge to home/self care.                   Follow-up Information       Follow up With Specialties Details Why Contact Info Additional Information    [facility], Nurse Practitioner Schedule an appointment as soon as possible for a visit  For follow up regarding your symptoms [address] [facility] [id number] [phone]       [facility] Schedule an appointment as soon as possible for a visit  For follow up regarding your symptoms [address]  [address]  [facility] [id number] [phone] [facility] [address], [place], [place], [address] [phone]            Discharge Medication List as of 8/29/2024 11:58 AM        START taking these medications    Details   lidocaine (LMX) 4 % cream Apply topically 2 (two) times a day as needed for moderate pain, Starting Thu 8/29/2024, Normal           CONTINUE these medications which have NOT CHANGED    Details   acetaminophen (TYLENOL) 650 mg CR tablet Take 1 tablet (650 mg total) by mouth every 8 (eight) hours as needed for mild pain, Starting Mon 2/5/2024, Normal    "   alendronate (FOSAMAX) 70 mg tablet TAKE ONE TABLET BY MOUTH ONCE WEEKLY, Starting Thu 1/4/2024, Normal      amLODIPine (NORVASC) 10 mg tablet Take 1 tablet (10 mg total) by mouth daily, Starting Mon 7/8/2024, Normal      atorvastatin (LIPITOR) 40 mg tablet Take 1 tablet (40 mg total) by mouth daily, Starting Mon 7/8/2024, Normal      baclofen 10 mg tablet Take 1 tablet (10 mg total) by mouth 3 (three) times a day as needed (pain), Starting Tue 4/23/2024, Normal      Calcium Carb-Cholecalciferol 600-10 MG-MCG TABS Take 1 tablet by mouth daily, Starting Mon 8/14/2023, Normal      cholecalciferol (VITAMIN D3) 1,000 units tablet Take 2 tablets (2,000 Units total) by mouth daily, Starting Mon 8/14/2023, Normal      cycloSPORINE (RESTASIS) 0.05 % ophthalmic emulsion Apply 1 drop to eye every 12 (twelve) hours, Starting Thu 5/16/2024, Until Wed 8/14/2024, Historical Med      Diclofenac Sodium (VOLTAREN) 1 % Apply 2 g topically 4 (four) times a day, Starting Mon 3/6/2023, Normal      diclofenac sodium (VOLTAREN) 50 mg EC tablet Take 1 tablet (50 mg total) by mouth 2 (two) times a day, Starting Tue 4/23/2024, Normal      famotidine (PEPCID) 20 mg tablet Take 1 tablet (20 mg total) by mouth 2 (two) times a day, Starting Tue 4/23/2024, Normal      FML Forte 0.25 % ophthalmic suspension Historical Med      Ketotifen Fumarate (ZADITOR) 0.035 % ophthalmic solution Apply 1 drop to eye 2 (two) times a day, Starting Mon 10/2/2023, Historical Med      losartan (COZAAR) 100 MG tablet Take 1 tablet (100 mg total) by mouth daily, Starting Mon 7/8/2024, Normal      methocarbamol (ROBAXIN) 500 mg tablet Take 1 tablet (500 mg total) by mouth 2 (two) times a day, Starting Sun 5/26/2024, Print      methylprednisolone (MEDROL) 4 mg tablet 6 tb po on day 1; 5 tb po on day 2; 4 tb po on day 3; 3 tb po on day 4; 2 tb po on day 5; 1 tb po on day 6, Print      olopatadine (PATANOL) 0.1 % ophthalmic solution Apply 1 drop to eye 2 (two) times a  day, Starting Tue 4/16/2024, Until Wed 4/16/2025, Historical Med           STOP taking these medications       lidocaine (Lidoderm) 5 % Comments:   Reason for Stopping:               No discharge procedures on file.    PDMP Review       None            ED Provider  Electronically Signed by             Merced Garsia PA-C  08/29/24 1300

## 2024-08-30 ENCOUNTER — OFFICE VISIT (OUTPATIENT)
Dept: OBGYN CLINIC | Facility: CLINIC | Age: 74
End: 2024-08-30
Payer: MEDICARE

## 2024-08-30 VITALS
HEIGHT: 64 IN | DIASTOLIC BLOOD PRESSURE: 72 MMHG | WEIGHT: 165.6 LBS | BODY MASS INDEX: 28.27 KG/M2 | SYSTOLIC BLOOD PRESSURE: 142 MMHG

## 2024-08-30 DIAGNOSIS — M17.11 PRIMARY OSTEOARTHRITIS OF RIGHT KNEE: Primary | ICD-10-CM

## 2024-08-30 PROCEDURE — 99213 OFFICE O/P EST LOW 20 MIN: CPT | Performed by: PHYSICIAN ASSISTANT

## 2024-08-30 NOTE — PROGRESS NOTES
Patient Name:  Shraddha Frazier  MRN:  [id number]    Assessment & Plan     Right knee DJD.  Patient notes worsening pain recently in the right knee.  She has been taking diclofenac tablets and performing home exercises with minimal improvement.  Authorization process initiated for right knee Durolane injection.  Follow-up once injection is approved.    Chief Complaint     Follow-up right knee pain.    History of the Present Illness     Shraddha Frazier is a 73 y.o. female who returns to the office today for follow-up regarding her right knee DJD.  Patient was last seen on 5/6/2024.  At that time her pain was minimal after participating in formal physical therapy and utilizing diclofenac tablets.  However recently her pain has become worse.  She denies any new injury or trauma.  Pain is now 9 out of 10 intensity and generalized about the right knee.  She does note associated swelling and stiffness.  Pain is worse with prolonged standing or walking.  She denies any weakness or instability.  She continues to perform home exercises and takes diclofenac tablets intermittently with improvement.  She also advises Voltaren gel intermittently as well.  She also utilizes an over-the-counter knee brace with some improvement.  No numbness or tingling.  No fevers or chills.  Patient has had previous intra-articular corticosteroid ejections without lasting improvement.    PT has failed conservative therapy? Yes   NSAIDS?  Diclofenac tablets provide transient improvement   Tylenol?  No improvement with Tylenol  Home exercises/Physical Therapy?  Continues home exercises with some improvement.  Weight Loss/Assistive devices?  Utilizes over-the-counter knee sleeve with some improvement  PT has failed or has contraindication to a corticosteroid injection therapy? Yes  PT is symptomatic (pain that interferes with ADL's, sleep, crepitus, or knee stiffness? Yes  PT pain score? (1-10): 9/10      Physical Exam     /72 (BP  "Location: Right arm, Patient Position: Sitting, Cuff Size: Standard)   Ht 5' 4\" (1.626 m)   Wt 75.1 kg (165 lb 9.6 oz)   LMP  (LMP Unknown)   BMI 28.43 kg/m²     Right knee: No gross deformity. Skin intact. No erythema ecchymosis or swelling. No effusion. Range of motion is full extension and flexion to 120 degrees without pain. There is crepitation appreciated with passive range of motion. Stable to varus and valgus stress without pain. Stable Lachman test. Negative posterior drawer test. Negative Mandeep's test. Extensor mechanism is intact.     Eyes: Anicteric sclerae.  ENT: Trachea midline.  Lungs: Normal respiratory effort.  CV: Capillary refill is less than 2 seconds.  Skin: Intact without erythema.  Lymph: No palpable lymphadenopathy.  Neuro: Sensation is grossly intact to light touch.  Psych: Mood and affect are appropriate.      Past Medical History:   Diagnosis Date    Chronic cough     Decreased hearing, left 05/02/2019    Echocardiogram abnormal 02/01/2016    Mild LVH with normal systolic function EF > 70%. Grade I Diastolic dysfunction. Aortic sclerosis w/o significant stenosis. Mild insufficiency. Mild MR. Mild TR with normal pulmonary artery pressure.     History of mammogram 09/13/2017    Benign    History of mammogram 02/15/2016    Benign    HTN (hypertension)     Osteopenia determined by x-ray 02/03/2016    osteopenia of the left femoral neck and normal bone marrow density of the left hip total. Osteopenia of the lumbar spine.     Primary osteoarthritis of left knee 06/08/2023    Primary osteoarthritis of right knee 08/07/2020       Past Surgical History:   Procedure Laterality Date    BLADDER SUSPENSION      HYSTERECTOMY      age 45       Allergies   Allergen Reactions    Aspirin     Penicillins Other (See Comments) and Rash       Current Outpatient Medications on File Prior to Visit   Medication Sig Dispense Refill    acetaminophen (TYLENOL) 650 mg CR tablet Take 1 tablet (650 mg total) by " mouth every 8 (eight) hours as needed for mild pain 30 tablet 0    alendronate (FOSAMAX) 70 mg tablet TAKE ONE TABLET BY MOUTH ONCE WEEKLY 12 tablet 0    amLODIPine (NORVASC) 10 mg tablet Take 1 tablet (10 mg total) by mouth daily 90 tablet 3    atorvastatin (LIPITOR) 40 mg tablet Take 1 tablet (40 mg total) by mouth daily 90 tablet 1    baclofen 10 mg tablet Take 1 tablet (10 mg total) by mouth 3 (three) times a day as needed (pain) 30 tablet 0    Calcium Carb-Cholecalciferol 600-10 MG-MCG TABS Take 1 tablet by mouth daily 90 tablet 0    carboxymethylcellulose 1 % ophthalmic solution Apply 1 drop to eye Three times a day      cholecalciferol (VITAMIN D3) 1,000 units tablet Take 2 tablets (2,000 Units total) by mouth daily 180 tablet 3    Diclofenac Sodium (VOLTAREN) 1 % Apply 2 g topically 4 (four) times a day 100 g 1    diclofenac sodium (VOLTAREN) 50 mg EC tablet Take 1 tablet (50 mg total) by mouth 2 (two) times a day 35 tablet 0    famotidine (PEPCID) 20 mg tablet Take 1 tablet (20 mg total) by mouth 2 (two) times a day 90 tablet 0    FML Forte 0.25 % ophthalmic suspension       Ketotifen Fumarate (ZADITOR) 0.035 % ophthalmic solution Apply 1 drop to eye 2 (two) times a day      lidocaine (LMX) 4 % cream Apply topically 2 (two) times a day as needed for moderate pain 120 g 0    losartan (COZAAR) 100 MG tablet Take 1 tablet (100 mg total) by mouth daily 90 tablet 3    methocarbamol (ROBAXIN) 500 mg tablet Take 1 tablet (500 mg total) by mouth 2 (two) times a day 20 tablet 0    methylprednisolone (MEDROL) 4 mg tablet 6 tb po on day 1; 5 tb po on day 2; 4 tb po on day 3; 3 tb po on day 4; 2 tb po on day 5; 1 tb po on day 6 21 tablet 0    olopatadine (PATANOL) 0.1 % ophthalmic solution Apply 1 drop to eye 2 (two) times a day      cycloSPORINE (RESTASIS) 0.05 % ophthalmic emulsion Apply 1 drop to eye every 12 (twelve) hours       Current Facility-Administered Medications on File Prior to Visit   Medication Dose  Route Frequency Provider Last Rate Last Admin    [COMPLETED] ketorolac (TORADOL) injection 15 mg  15 mg Intramuscular Once Merced Garsia PA-C   15 mg at 08/29/24 1159       Social History     Tobacco Use    Smoking status: Never     Passive exposure: Never    Smokeless tobacco: Never   Vaping Use    Vaping status: Never Used   Substance Use Topics    Alcohol use: Not Currently     Alcohol/week: 1.0 standard drink of alcohol     Types: 1 Glasses of wine per week    Drug use: Never       Family History   Problem Relation Age of Onset    No Known Problems Mother     No Known Problems Father     No Known Problems Sister     No Known Problems Daughter     No Known Problems Maternal Grandmother     No Known Problems Maternal Grandfather     No Known Problems Paternal Grandmother     No Known Problems Paternal Grandfather     No Known Problems Maternal Aunt     No Known Problems Paternal Aunt     No Known Problems Paternal Aunt     No Known Problems Paternal Aunt     No Known Problems Paternal Aunt     No Known Problems Paternal Aunt        Review of Systems     As stated in the HPI. All other systems reviewed and are negative.

## 2024-09-05 ENCOUNTER — PROCEDURE VISIT (OUTPATIENT)
Dept: OBGYN CLINIC | Facility: CLINIC | Age: 74
End: 2024-09-05
Payer: MEDICARE

## 2024-09-05 VITALS
BODY MASS INDEX: 29.52 KG/M2 | WEIGHT: 166.6 LBS | SYSTOLIC BLOOD PRESSURE: 132 MMHG | DIASTOLIC BLOOD PRESSURE: 74 MMHG | HEIGHT: 63 IN

## 2024-09-05 DIAGNOSIS — M17.11 PRIMARY OSTEOARTHRITIS OF RIGHT KNEE: Primary | ICD-10-CM

## 2024-09-05 PROCEDURE — 20610 DRAIN/INJ JOINT/BURSA W/O US: CPT | Performed by: PHYSICIAN ASSISTANT

## 2024-09-05 NOTE — LETTER
September 5, 2024     Patient: Shraddha Frazier  YOB: 1950  Date of Visit: 9/5/2024      To Whom it May Concern:    Shraddha Frazier is under my professional care. Shraddha was seen in my office on 9/5/2024. Candida is out of work today, 9/5/2024.    If you have any questions or concerns, please don't hesitate to call.         Sincerely,          Kamlesh Nicho Higginbotham PA-C      
Detail Level: Zone
General Sunscreen Counseling: I recommended a broad spectrum sunscreen with a SPF of 30 or higher. Reviewed with patient the importance of examining skin for any changes, monitoring existing moles and lesions, and reviewed the ABCDEs of skin cancers including melanoma.

## 2024-09-05 NOTE — PROGRESS NOTES
Patient Name:  Shraddha Frazier  MR#:  [id number]    The patient presents for Right Knee Durolane Injection.  Currently pain is 7-8 out of 10 in intensity.    Large joint arthrocentesis: R knee  Procedure Details  Location: knee - R knee  Needle size: 22 G  Ultrasound guidance: no  Approach: anterolateral  Medications administered: 3 mL sodium hyaluronate 60 MG/3ML    Patient tolerance: patient tolerated the procedure well with no immediate complications  Dressing:  Sterile dressing applied        Reviewed post-injection care with patient.  Follow up in three months with Dr. Cardenas to assess the response to the injection performed today.       utilized during today's encounter.

## 2024-09-16 ENCOUNTER — OFFICE VISIT (OUTPATIENT)
Dept: FAMILY MEDICINE CLINIC | Facility: CLINIC | Age: 74
End: 2024-09-16

## 2024-09-16 VITALS
WEIGHT: 165 LBS | BODY MASS INDEX: 29.23 KG/M2 | OXYGEN SATURATION: 95 % | DIASTOLIC BLOOD PRESSURE: 72 MMHG | TEMPERATURE: 98.6 F | RESPIRATION RATE: 18 BRPM | HEART RATE: 86 BPM | SYSTOLIC BLOOD PRESSURE: 136 MMHG | HEIGHT: 63 IN

## 2024-09-16 DIAGNOSIS — E78.5 HYPERLIPIDEMIA, UNSPECIFIED HYPERLIPIDEMIA TYPE: ICD-10-CM

## 2024-09-16 DIAGNOSIS — M17.11 PRIMARY OSTEOARTHRITIS OF RIGHT KNEE: ICD-10-CM

## 2024-09-16 DIAGNOSIS — E66.3 OVERWEIGHT (BMI 25.0-29.9): ICD-10-CM

## 2024-09-16 DIAGNOSIS — Z12.31 ENCOUNTER FOR SCREENING MAMMOGRAM FOR BREAST CANCER: ICD-10-CM

## 2024-09-16 DIAGNOSIS — M81.0 AGE-RELATED OSTEOPOROSIS WITHOUT CURRENT PATHOLOGICAL FRACTURE: ICD-10-CM

## 2024-09-16 DIAGNOSIS — I10 ESSENTIAL HYPERTENSION: Primary | ICD-10-CM

## 2024-09-16 DIAGNOSIS — R73.03 PREDIABETES: ICD-10-CM

## 2024-09-16 PROCEDURE — G2211 COMPLEX E/M VISIT ADD ON: HCPCS

## 2024-09-16 PROCEDURE — 99214 OFFICE O/P EST MOD 30 MIN: CPT

## 2024-09-16 RX ORDER — FAMOTIDINE 20 MG/1
20 TABLET, FILM COATED ORAL 2 TIMES DAILY
Qty: 90 TABLET | Refills: 0 | Status: SHIPPED | OUTPATIENT
Start: 2024-09-16

## 2024-09-16 RX ORDER — CALCIUM CARBONATE/VITAMIN D3 600 MG-10
1 TABLET ORAL DAILY
Qty: 90 TABLET | Refills: 0 | Status: SHIPPED | OUTPATIENT
Start: 2024-09-16 | End: 2024-09-25 | Stop reason: SDUPTHER

## 2024-09-16 NOTE — LETTER
September 16, 2024     Patient: Shraddha Frazier  YOB: 1950  Date of Visit: 9/16/2024      To Whom it May Concern:    Shraddha Frazier is under my professional care. Shraddha was seen in my office on 9/16/2024.     Please schedule patient from 9/13/24 to 9/17/24    If you have any questions or concerns, please don't hesitate to call.         Sincerely,          MINDY King        CC: No Recipients

## 2024-09-16 NOTE — ASSESSMENT & PLAN NOTE
Body mass index is 29.23 kg/m².  The BMI is above normal. Nutrition recommendations include reducing portion sizes, decreasing overall calorie intake, 3-5 servings of fruits/vegetables daily, reducing fast food intake, consuming healthier snacks, decreasing soda and/or juice intake, moderation in carbohydrate intake, increasing intake of lean protein, reducing intake of saturated fat and trans fat and reducing intake of cholesterol. Exercise recommendations include moderate aerobic physical activity for 150 minutes/week.

## 2024-09-16 NOTE — ASSESSMENT & PLAN NOTE
BP Readings from Last 3 Encounters:   09/16/24 136/72   09/05/24 132/74   08/30/24 142/72    - At goal  -Continue Losartan 100 mg & amlodipine 10 mg daily.   -Reports compliance with medications.  -reviewed eating a low salt diet,  exercising, and wt loss.     Orders:    CBC and differential; Future

## 2024-09-16 NOTE — ASSESSMENT & PLAN NOTE
-s/p right knee injection on 9/5/24 with ortho. She stated it was ineffective.   -Continue Tylenol  + Voltaren gel  -Take diclofenac 50 mg BID. Take with pepcid.   -Encouraged Heat/Ice application  -Discussed regular exercise for skeletal strengthening.  -Continue using a tibiofemoral knee brace.   - Discuss to follow-up with ortho.     Orders:    diclofenac sodium (VOLTAREN) 50 mg EC tablet; Take 1 tablet (50 mg total) by mouth 2 (two) times a day    famotidine (PEPCID) 20 mg tablet; Take 1 tablet (20 mg total) by mouth 2 (two) times a day

## 2024-09-18 ENCOUNTER — APPOINTMENT (OUTPATIENT)
Dept: LAB | Facility: HOSPITAL | Age: 74
End: 2024-09-18
Payer: MEDICARE

## 2024-09-18 DIAGNOSIS — E78.5 HYPERLIPIDEMIA, UNSPECIFIED HYPERLIPIDEMIA TYPE: ICD-10-CM

## 2024-09-18 DIAGNOSIS — I10 ESSENTIAL HYPERTENSION: ICD-10-CM

## 2024-09-18 DIAGNOSIS — R73.03 PREDIABETES: ICD-10-CM

## 2024-09-18 LAB
BASOPHILS # BLD AUTO: 0.08 THOUSANDS/ΜL (ref 0–0.1)
BASOPHILS NFR BLD AUTO: 1 % (ref 0–1)
CHOLEST SERPL-MCNC: 149 MG/DL
EOSINOPHIL # BLD AUTO: 0.11 THOUSAND/ΜL (ref 0–0.61)
EOSINOPHIL NFR BLD AUTO: 2 % (ref 0–6)
ERYTHROCYTE [DISTWIDTH] IN BLOOD BY AUTOMATED COUNT: 13.3 % (ref 11.6–15.1)
EST. AVERAGE GLUCOSE BLD GHB EST-MCNC: 117 MG/DL
HBA1C MFR BLD: 5.7 %
HCT VFR BLD AUTO: 39.5 % (ref 34.8–46.1)
HDLC SERPL-MCNC: 48 MG/DL
HGB BLD-MCNC: 13.7 G/DL (ref 11.5–15.4)
IMM GRANULOCYTES # BLD AUTO: 0.01 THOUSAND/UL (ref 0–0.2)
IMM GRANULOCYTES NFR BLD AUTO: 0 % (ref 0–2)
LDLC SERPL CALC-MCNC: 77 MG/DL (ref 0–100)
LYMPHOCYTES # BLD AUTO: 2.13 THOUSANDS/ΜL (ref 0.6–4.47)
LYMPHOCYTES NFR BLD AUTO: 31 % (ref 14–44)
MCH RBC QN AUTO: 31.6 PG (ref 26.8–34.3)
MCHC RBC AUTO-ENTMCNC: 34.7 G/DL (ref 31.4–37.4)
MCV RBC AUTO: 91 FL (ref 82–98)
MONOCYTES # BLD AUTO: 0.63 THOUSAND/ΜL (ref 0.17–1.22)
MONOCYTES NFR BLD AUTO: 9 % (ref 4–12)
NEUTROPHILS # BLD AUTO: 3.92 THOUSANDS/ΜL (ref 1.85–7.62)
NEUTS SEG NFR BLD AUTO: 57 % (ref 43–75)
NONHDLC SERPL-MCNC: 101 MG/DL
NRBC BLD AUTO-RTO: 0 /100 WBCS
PLATELET # BLD AUTO: 247 THOUSANDS/UL (ref 149–390)
PMV BLD AUTO: 10.1 FL (ref 8.9–12.7)
RBC # BLD AUTO: 4.33 MILLION/UL (ref 3.81–5.12)
TRIGL SERPL-MCNC: 121 MG/DL
WBC # BLD AUTO: 6.88 THOUSAND/UL (ref 4.31–10.16)

## 2024-09-18 PROCEDURE — 85025 COMPLETE CBC W/AUTO DIFF WBC: CPT

## 2024-09-18 PROCEDURE — 83036 HEMOGLOBIN GLYCOSYLATED A1C: CPT

## 2024-09-18 PROCEDURE — 36415 COLL VENOUS BLD VENIPUNCTURE: CPT

## 2024-09-18 PROCEDURE — 80061 LIPID PANEL: CPT

## 2024-09-20 ENCOUNTER — HOSPITAL ENCOUNTER (OUTPATIENT)
Dept: MAMMOGRAPHY | Facility: CLINIC | Age: 74
End: 2024-09-20
Payer: MEDICARE

## 2024-09-20 ENCOUNTER — OFFICE VISIT (OUTPATIENT)
Dept: OBGYN CLINIC | Facility: CLINIC | Age: 74
End: 2024-09-20
Payer: MEDICARE

## 2024-09-20 VITALS
BODY MASS INDEX: 29.23 KG/M2 | HEIGHT: 63 IN | SYSTOLIC BLOOD PRESSURE: 133 MMHG | HEART RATE: 65 BPM | WEIGHT: 165 LBS | DIASTOLIC BLOOD PRESSURE: 83 MMHG

## 2024-09-20 VITALS — HEIGHT: 63 IN | BODY MASS INDEX: 29.23 KG/M2 | WEIGHT: 165 LBS

## 2024-09-20 DIAGNOSIS — M17.11 PRIMARY OSTEOARTHRITIS OF RIGHT KNEE: Primary | ICD-10-CM

## 2024-09-20 DIAGNOSIS — Z12.31 ENCOUNTER FOR SCREENING MAMMOGRAM FOR BREAST CANCER: ICD-10-CM

## 2024-09-20 DIAGNOSIS — M23.91 INTERNAL DERANGEMENT OF RIGHT KNEE: ICD-10-CM

## 2024-09-20 PROCEDURE — 77067 SCR MAMMO BI INCL CAD: CPT

## 2024-09-20 PROCEDURE — 99214 OFFICE O/P EST MOD 30 MIN: CPT | Performed by: STUDENT IN AN ORGANIZED HEALTH CARE EDUCATION/TRAINING PROGRAM

## 2024-09-20 PROCEDURE — 77063 BREAST TOMOSYNTHESIS BI: CPT

## 2024-09-20 NOTE — PROGRESS NOTES
Date: 24  Shraddha Frazier   MRN# [id number]  : 1950      Chief Complaint: Right Knee Pain    Assessment and Plan:  The patient verbalized understanding of exam findings and treatment plan. We engaged in the shared decision-making process and treatment options were discussed at length with the patient. Surgical and conservative management discussed today along with risks and benefits. Patient was agreeable with the plan and all questions were answered to satisfaction.     Osteoarthritis of right knee  Patient is a Elderly (Approx >66yo) female with functionally limiting knee pain with short distances, functional instability, mechanical symptoms and identified modifiable risk factors.  Radiographic evaluation demonstrates mild degenerative changes in all compartments. Physical exam reveals neutral alignment and full range of motion. Given these findings, I recommend:    - WBAT  - Patient has trialed injections, physical therapy, activity modification without relief of her symptoms. MRI right knee ordered to further evaluate for knee pathology  - Activity modification to limit strain or impact on the joint as needed  - NSAIDs as needed  - Tylenol 1000mg up to three times daily as needed. Do not exceed 3000mg daily  - Continue PT/HEP as directed  - Knee sleeve or brace for comfort as needed prn  - Cane or walker recommended to offload joint as needed  - Patient may follow up MRI review              Subjective:     Knee Pain  Patient presents to the clinic today, self referred, with complaints of right knee pain.  Patient is Icelandic-speaking only and requires the use of translation software to perform history and physical examination.  This is evaluated as a personal injury. The pain began a few months ago. The pain is located medial, lateral and rates their pain as 7/10. She describes the symptoms as aching and sharp. Symptoms improve with rest. The symptoms are worse with activity. The knee  "has not given out or felt unstable. The patient can bend and straighten the knee fully.  Patient has been seen by Kamlesh Higginbotham in the past he was treated the knee on several occasions.  Treatment to date has been NSAID's, cortisone injection, therapy, without significant relief.     Prior treatment:  NSAIDs Yes    Bracing Yes   Physical Therapy Yes   Cortisone Injections Yes   Viscosupplementation Yes     External Records Reviewed:   office notes    Orthopedic PMH  Right knee previously treated by Kamlesh Higginbotham [facility]    Orthopedic Surgical History  None    Estimated body mass index is 29.23 kg/m² as calculated from the following:    Height as of this encounter: 5' 3\" (1.6 m).    Weight as of this encounter: 74.8 kg (165 lb).    Lab Results   Component Value Date    HGBA1C 5.7 (H) 09/18/2024    HGBA1C 5.9 (H) 11/14/2023    HGBA1C 5.8 (H) 05/08/2023   .   Lab Results   Component Value Date    EGFR 92 11/14/2023    EGFR 86 04/11/2023    EGFR 90 10/26/2022    CREATININE 0.56 (L) 11/14/2023    CREATININE 0.70 04/11/2023    CREATININE 0.61 10/26/2022        Allergy:  Allergies   Allergen Reactions    Aspirin     Penicillins Other (See Comments) and Rash     Medications:  All current active meds have been reviewed   Past Medical History:  Past Medical History:   Diagnosis Date    Chronic cough     Decreased hearing, left 05/02/2019    Echocardiogram abnormal 02/01/2016    Mild LVH with normal systolic function EF > 70%. Grade I Diastolic dysfunction. Aortic sclerosis w/o significant stenosis. Mild insufficiency. Mild MR. Mild TR with normal pulmonary artery pressure.     History of mammogram 09/13/2017    Benign    History of mammogram 02/15/2016    Benign    HTN (hypertension)     Osteopenia determined by x-ray 02/03/2016    osteopenia of the left femoral neck and normal bone marrow density of the left hip total. Osteopenia of the lumbar spine.     Primary osteoarthritis of left knee 06/08/2023    Primary osteoarthritis of " "right knee 08/07/2020     Past Surgical History:  Past Surgical History:   Procedure Laterality Date    BLADDER SUSPENSION      HYSTERECTOMY      age 45     Family History:  Family History   Problem Relation Age of Onset    No Known Problems Mother     No Known Problems Father     No Known Problems Sister     No Known Problems Daughter     No Known Problems Maternal Grandmother     No Known Problems Maternal Grandfather     No Known Problems Paternal Grandmother     No Known Problems Paternal Grandfather     No Known Problems Maternal Aunt     No Known Problems Paternal Aunt     No Known Problems Paternal Aunt     No Known Problems Paternal Aunt     No Known Problems Paternal Aunt     No Known Problems Paternal Aunt      Social History:  Social History     Substance and Sexual Activity   Alcohol Use Not Currently    Alcohol/week: 1.0 standard drink of alcohol    Types: 1 Glasses of wine per week     Social History     Substance and Sexual Activity   Drug Use Never     Social History     Tobacco Use   Smoking Status Never    Passive exposure: Never   Smokeless Tobacco Never           Review of Systems:  General- denies fever/chills  HEENT- denies hearing loss or sore throat  Eyes- denies eye pain or visual disturbances, denies red eyes  Respiratory- denies cough or SOB  Cardio- denies chest pain or palpitations  GI- denies abdominal pain  Endocrine- denies urinary frequency  Urinary- denies pain with urination  Musculoskeletal- Negative except noted above  Skin- denies rashes or wounds  Neurological- denies dizziness or headache  Psychiatric- denies anxiety or difficulty concentrating      Objective:   BP Readings from Last 1 Encounters:   09/20/24 133/83      Wt Readings from Last 1 Encounters:   09/20/24 74.8 kg (165 lb)      Pulse Readings from Last 1 Encounters:   09/20/24 65        BMI: Estimated body mass index is 29.23 kg/m² as calculated from the following:    Height as of this encounter: 5' 3\" (1.6 m).    " "Weight as of this encounter: 74.8 kg (165 lb).      Physical Exam  /83   Pulse 65   Ht 5' 3\" (1.6 m)   Wt 74.8 kg (165 lb)   LMP  (LMP Unknown)   BMI 29.23 kg/m²   General/Constitutional: No apparent distress: well-nourished and well developed.  Eyes: normal ocular motion  Cardio: RRR, Normal S1S2, No m/r/g.   Lymphatic: No appreciable lymphadenopathy  Respiratory: Non-labored breathing, CTA b/l no w/c/r  Vascular: No edema, swelling or tenderness, except as noted in detailed exam. Extremities well perfused. No LE edema  Integumentary: No impressive skin lesions present, except as noted in detailed exam.  Neuro: No ataxia or tremors noted  Psych: Normal mood and affect, oriented to person, place and time. Appropriate affect.  Musculoskeletal: Normal, except as noted in detailed exam and in HPI.    Gait and Station:   normal and antalgic    Right Knee:      Inspection:  normal color, temperature, turgor and moisture    Overall limb alignment: neutral    Effusion: no    ROM 0 to 130 with pain    Extensor Lag: Absent    Palpation: Medial and Lateral joint line tenderness to palpation    stable to AP translation at 90 deg    Coronal plane stable in full extension    Coronal plane stable in mid-flexion     Motor: 5/5 EHL/FHL/TA/GS/Qd/Hs    Vascular: Toes WWP with BCR    Sensory: SILT DP/SP/Oliver/Saph/Ti        Images:  I personally reviewed relevant images in the PACS system and my interpretation is as follows:    X-rays of the right knee: mild to moderate joint space narrowing, subchondral sclerosis, subchondral cysts, osteophyte formation. No fracture or dislocation.         Scribe Attestation      I,:  Rock Dixon PA-C am acting as a scribe while in the presence of the attending physician.:       I,:  Danny Cardenas MD personally performed the services described in this documentation    as scribed in my presence.:               Danny Cardenas MD  Adult Reconstruction Specialist   [facility] " [facility]

## 2024-09-20 NOTE — ASSESSMENT & PLAN NOTE
Patient is a Elderly (Approx >64yo) female with functionally limiting knee pain with short distances, functional instability, mechanical symptoms and identified modifiable risk factors.  Radiographic evaluation demonstrates mild degenerative changes in all compartments. Physical exam reveals neutral alignment and full range of motion. Given these findings, I recommend:    - WBAT  - Patient has trialed injections, physical therapy, activity modification without relief of her symptoms. MRI right knee ordered to further evaluate for knee pathology  - Activity modification to limit strain or impact on the joint as needed  - NSAIDs as needed  - Tylenol 1000mg up to three times daily as needed. Do not exceed 3000mg daily  - Continue PT/HEP as directed  - Knee sleeve or brace for comfort as needed prn  - Cane or walker recommended to offload joint as needed  - Patient may follow up MRI review

## 2024-09-25 ENCOUNTER — OFFICE VISIT (OUTPATIENT)
Dept: FAMILY MEDICINE CLINIC | Facility: CLINIC | Age: 74
End: 2024-09-25

## 2024-09-25 VITALS
BODY MASS INDEX: 29.41 KG/M2 | RESPIRATION RATE: 17 BRPM | TEMPERATURE: 97.5 F | HEART RATE: 90 BPM | HEIGHT: 63 IN | SYSTOLIC BLOOD PRESSURE: 150 MMHG | OXYGEN SATURATION: 98 % | WEIGHT: 166 LBS | DIASTOLIC BLOOD PRESSURE: 90 MMHG

## 2024-09-25 DIAGNOSIS — M81.0 AGE-RELATED OSTEOPOROSIS WITHOUT CURRENT PATHOLOGICAL FRACTURE: ICD-10-CM

## 2024-09-25 DIAGNOSIS — M25.562 CHRONIC PAIN OF LEFT KNEE: ICD-10-CM

## 2024-09-25 DIAGNOSIS — M17.11 PRIMARY OSTEOARTHRITIS OF RIGHT KNEE: Primary | ICD-10-CM

## 2024-09-25 DIAGNOSIS — G89.29 CHRONIC PAIN OF LEFT KNEE: ICD-10-CM

## 2024-09-25 DIAGNOSIS — E55.9 VITAMIN D DEFICIENCY: ICD-10-CM

## 2024-09-25 PROCEDURE — 99213 OFFICE O/P EST LOW 20 MIN: CPT | Performed by: PHYSICIAN ASSISTANT

## 2024-09-25 PROCEDURE — G2211 COMPLEX E/M VISIT ADD ON: HCPCS | Performed by: PHYSICIAN ASSISTANT

## 2024-09-25 RX ORDER — CHOLECALCIFEROL (VITAMIN D3) 25 MCG
2000 TABLET ORAL DAILY
Qty: 180 TABLET | Refills: 3 | Status: SHIPPED | OUTPATIENT
Start: 2024-09-25

## 2024-09-25 RX ORDER — CALCIUM CARBONATE/VITAMIN D3 600 MG-10
1 TABLET ORAL DAILY
Qty: 90 TABLET | Refills: 1 | Status: SHIPPED | OUTPATIENT
Start: 2024-09-25

## 2024-09-25 NOTE — ASSESSMENT & PLAN NOTE
-Reviewed orthopedics notes from 8/30/2024, 9/5/2024, and 9/20/2024.  Patient has underwent several injections of bilateral knees with little relief of pain.  - Patient is scheduled for MRI right knee on 10/3/2024 for further evaluation.  - Work note provided to patient.  Current return to work goal date is 10/21/2024.  - Will follow-up in 3 weeks.  - Continue close follow-up with orthopedics as scheduled.

## 2024-09-25 NOTE — LETTER
September 25, 2024     Patient: Shraddha Frazier  YOB: 1950  Date of Visit: 9/25/2024      To Whom it May Concern:    Shraddha Frazier is under my professional care. Shraddha was seen in my office on 9/25/2024. Shraddha may return to work on 10/21/2024 .    If you have any questions or concerns, please don't hesitate to call.         Sincerely,          Angie Peace PA-C          CC: No Recipients

## 2024-09-25 NOTE — PROGRESS NOTES
Assessment/Plan:    Osteoarthritis of right knee  -Reviewed orthopedics notes from 8/30/2024, 9/5/2024, and 9/20/2024.  Patient has underwent several injections of bilateral knees with little relief of pain.  - Patient is scheduled for MRI right knee on 10/3/2024 for further evaluation.  - Work note provided to patient.  Current return to work goal date is 10/21/2024.  - Will follow-up in 3 weeks.  - Continue close follow-up with orthopedics as scheduled.      Diagnoses and all orders for this visit:    Primary osteoarthritis of right knee    Vitamin D deficiency  -     cholecalciferol (VITAMIN D3) 1,000 units tablet; Take 2 tablets (2,000 Units total) by mouth daily    Age-related osteoporosis without current pathological fracture  -     cholecalciferol (VITAMIN D3) 1,000 units tablet; Take 2 tablets (2,000 Units total) by mouth daily  -     Calcium Carb-Cholecalciferol 600-10 MG-MCG TABS; Take 1 tablet by mouth daily    Chronic pain of left knee          All of patients questions were answered. Patient understands and agrees with the above plan.     Return in about 3 weeks (around 10/16/2024) for Next scheduled follow up knee pain.    Angie Peace PA-C  09/25/24  [facility]          Subjective:     Patient ID: Shraddha Frazier  is a 73 y.o. female with known PMH of osteoporosis, hypertension, chronic pain of bilateral knees, hyperlipidemia, prediabetes, varicose veins who presents today in office for same day visit for same-day visit for knee pain.     - Patient is a 73 y.o. female who presents today for same-day visit for knee pain.    Patient notes she has been experiencing chronic bilateral knee pain for over 2 years, but recently the pain has been worsening.  Patient notes for the past few days her left knee has been causing her increased pain.  Patient notes she spoke with her management at her job and they recommended for her to take some time off work while she recuperates.    Patient notes she  "has to be standing the entire time at work and it is very difficult to do so because she experiences increased pain and swelling of bilateral knees.  Patient notes she did try to go to work on Monday, but had to leave early due to the pain.  Patient notes she has received several injections of bilateral knees with little relief.  She is scheduled for an upcoming MRI of her right knee.      The following portions of the patient's history were reviewed and updated as appropriate: allergies, current medications, past family history, past medical history, past social history, past surgical history, and problem list.        Review of Systems   Constitutional:  Negative for chills and fever.   Musculoskeletal:  Positive for arthralgias, gait problem and joint swelling.   All other systems reviewed and are negative.                Objective:   Vitals:    09/25/24 0835   BP: 150/90   BP Location: Left arm   Patient Position: Sitting   Cuff Size: Standard   Pulse: 90   Resp: 17   Temp: 97.5 °F (36.4 °C)   TempSrc: Temporal   SpO2: 98%   Weight: 75.3 kg (166 lb)   Height: 5' 3\" (1.6 m)         Physical Exam  Vitals and nursing note reviewed.   Constitutional:       General: She is not in acute distress.     Appearance: She is well-developed.   HENT:      Head: Normocephalic and atraumatic.      Right Ear: External ear normal.      Left Ear: External ear normal.      Nose: Nose normal.   Eyes:      Conjunctiva/sclera: Conjunctivae normal.   Cardiovascular:      Rate and Rhythm: Normal rate and regular rhythm.      Pulses: Normal pulses.      Heart sounds: Normal heart sounds.   Pulmonary:      Effort: Pulmonary effort is normal. No respiratory distress.      Breath sounds: Normal breath sounds. No wheezing.   Musculoskeletal:      Cervical back: Normal range of motion and neck supple.      Right knee: Swelling present. Decreased range of motion. Tenderness present.      Left knee: Swelling present. Decreased range of motion. " Tenderness present.   Skin:     General: Skin is warm and dry.   Neurological:      Mental Status: She is alert and oriented to person, place, and time.      Gait: Gait abnormal.   Psychiatric:         Behavior: Behavior normal.          - Utilized [employer] services for translation.

## 2024-10-03 ENCOUNTER — HOSPITAL ENCOUNTER (OUTPATIENT)
Dept: MRI IMAGING | Facility: HOSPITAL | Age: 74
Discharge: HOME | End: 2024-10-03
Payer: MEDICARE

## 2024-10-03 DIAGNOSIS — M17.11 PRIMARY OSTEOARTHRITIS OF RIGHT KNEE: ICD-10-CM

## 2024-10-03 DIAGNOSIS — M23.91 INTERNAL DERANGEMENT OF RIGHT KNEE: ICD-10-CM

## 2024-10-03 PROCEDURE — 73721 MRI JNT OF LWR EXTRE W/O DYE: CPT

## 2024-10-11 ENCOUNTER — OFFICE VISIT (OUTPATIENT)
Dept: OBGYN CLINIC | Facility: CLINIC | Age: 74
End: 2024-10-11
Payer: COMMERCIAL

## 2024-10-11 VITALS — HEIGHT: 64 IN | BODY MASS INDEX: 28.34 KG/M2 | WEIGHT: 166 LBS

## 2024-10-11 DIAGNOSIS — M17.11 PRIMARY OSTEOARTHRITIS OF RIGHT KNEE: Primary | ICD-10-CM

## 2024-10-11 DIAGNOSIS — T14.8XXA JOINT INJURY: ICD-10-CM

## 2024-10-11 PROCEDURE — 99215 OFFICE O/P EST HI 40 MIN: CPT | Performed by: STUDENT IN AN ORGANIZED HEALTH CARE EDUCATION/TRAINING PROGRAM

## 2024-10-11 RX ORDER — FERROUS SULFATE 324(65)MG
324 TABLET, DELAYED RELEASE (ENTERIC COATED) ORAL
Qty: 60 TABLET | Refills: 0 | Status: SHIPPED | OUTPATIENT
Start: 2024-10-11

## 2024-10-11 RX ORDER — CHLORHEXIDINE GLUCONATE 40 MG/ML
SOLUTION TOPICAL DAILY PRN
OUTPATIENT
Start: 2024-10-11

## 2024-10-11 RX ORDER — CHLORHEXIDINE GLUCONATE ORAL RINSE 1.2 MG/ML
15 SOLUTION DENTAL ONCE
OUTPATIENT
Start: 2024-10-11 | End: 2024-10-11

## 2024-10-11 RX ORDER — FOLIC ACID 1 MG/1
1 TABLET ORAL DAILY
Qty: 30 TABLET | Refills: 0 | Status: SHIPPED | OUTPATIENT
Start: 2024-10-11

## 2024-10-11 RX ORDER — ACETAMINOPHEN 325 MG/1
975 TABLET ORAL ONCE
OUTPATIENT
Start: 2024-10-11 | End: 2024-10-11

## 2024-10-11 RX ORDER — MULTIVIT-MIN/IRON FUM/FOLIC AC 7.5 MG-4
1 TABLET ORAL DAILY
Qty: 30 TABLET | Refills: 0 | Status: SHIPPED | OUTPATIENT
Start: 2024-10-11

## 2024-10-11 RX ORDER — ASCORBIC ACID 500 MG
500 TABLET ORAL DAILY
Qty: 30 TABLET | Refills: 0 | Status: SHIPPED | OUTPATIENT
Start: 2024-10-11

## 2024-10-11 NOTE — PROGRESS NOTES
Date: 10/11/24  Shraddha Frazier   MRN# [id number]  : 1950      Chief Complaint: Right Knee Pain    Assessment and Plan:  The patient verbalized understanding of exam findings and treatment plan. We engaged in the shared decision-making process and treatment options were discussed at length with the patient. Surgical and conservative management discussed today along with risks and benefits. Patient was agreeable with the plan and all questions were answered to satisfaction.     Osteoarthritis of right knee  Patient is a Elderly (Approx >66yo) female with functionally limiting knee pain with short distances, functional instability, mechanical symptoms and identified modifiable risk factors.  Radiographic evaluation demonstrates severe degenerative changes in the medial compartment. Physical exam reveals neutral alignment and full range of motion. Given these findings, I recommend:    Right partial vs Total Knee Arthroplasty     - WBAT  - She has failed conservative treatment including anti-inflammatories, activity modification, HEP, CSI injections  - Consent signed. All questions and concerns addressed  - F/u in 2 weeks post op    PARTIAL VERSUS TOTAL KNEE REPLACEMENT INDICATIONS AND RISKS    We had a lengthy discussion with the patient regarding the potential options for treatment. The patient has had an extensive conservative management course up until this time and therefore I do not feel that any additional conservative management will provide additional relief. The patient's symptoms have progressively worsened to the point where they now limit the patient's daily activities and quality of life.     At this time, I feel that this patient would be an excellent candidate for a knee replacement. The patient has failed non-operative care and continues to have unacceptable symptoms. We discussed the treatment options and alternatives and the risks and benefits of surgery in great detail.    While  no guarantees can be made, knee replacement has a very high success rate in terms of relieving a patient's knee pain and returning them to a more active, independent lifestyle for 10-15 years or more. All surgery carries some risk; for knee replacement, the complication rate is low but may include: death (very rare), infection, bleeding requiring transfusion, blood clots in legs traveling to lungs, nerve and/or blood vessel damage, bone fracture, persistent knee pain and/or stiffness, and repeat surgery(ies). The risk of a major complication is about 1-2 per 1000 cases. Knee replacement should only be done if conservative treatment has failed. The revision rate for knee replacements is about 1-2% per year; in other words, 85-95% of knee replacements may last 10 years; 75-85% last 20 years; and so on, assuming no injury to the knee. Finally we discussed anesthesia related complications which will be discussed in greater detail with the anesthesia team before surgery.     The patients current BMI is Body mass index is 28.49 kg/m². .Patient was counseled about the importance of weight loss prior to surgery, IF BMI is >35 prior to surgery my recommendation is that the patient considers nutritional consultation and further weight loss prior to surgical management. These recommendations were discussed with the patient.    If the patient is a smoker, I discussed the importance of quitting smoking to decrease risk of infection postoperatively and promote good wound healing.     The patient was shown knee replacement booklets, diagrams and/or models and all of their questions have been answered at the present time. The patient may call or come in if they have any other concerns or questions. The patient also understands the post-operative rehabilitation process and the need for their cooperation and participation, and that their results may be compromised by their lack of compliance. The patient would like to proceed.  Patient is encouraged to seek additional opinions if they so desire. The patient voiced their understanding of the surgical plan and potential complications and wishes to proceed with surgery.         Subjective:   Shraddha Frazier is a 73 y.o. female who is being seen in follow-up for Right knee pain. When we last saw she we recommended WBAT, obtain MRI.  Pain has stayed the same.. No other orthopedic complaints or concerns.       Prior treatment:  NSAIDs Yes    Bracing Yes   Physical Therapy No   Cortisone Injections Yes   Viscosupplementation Yes    Allergy:  Allergies   Allergen Reactions    Aspirin     Penicillins Other (See Comments) and Rash     Medications:  All current active meds have been reviewed   Past Medical History:  Past Medical History:   Diagnosis Date    Chronic cough     Decreased hearing, left 05/02/2019    Echocardiogram abnormal 02/01/2016    Mild LVH with normal systolic function EF > 70%. Grade I Diastolic dysfunction. Aortic sclerosis w/o significant stenosis. Mild insufficiency. Mild MR. Mild TR with normal pulmonary artery pressure.     History of mammogram 09/13/2017    Benign    History of mammogram 02/15/2016    Benign    HTN (hypertension)     Osteopenia determined by x-ray 02/03/2016    osteopenia of the left femoral neck and normal bone marrow density of the left hip total. Osteopenia of the lumbar spine.     Primary osteoarthritis of left knee 06/08/2023    Primary osteoarthritis of right knee 08/07/2020     Past Surgical History:  Past Surgical History:   Procedure Laterality Date    BLADDER SUSPENSION      HYSTERECTOMY      age 45     Family History:  Family History   Problem Relation Age of Onset    No Known Problems Mother     No Known Problems Father     No Known Problems Sister     No Known Problems Sister     No Known Problems Sister     No Known Problems Sister     No Known Problems Sister     No Known Problems Sister     No Known Problems Sister     No Known Problems  "Sister     No Known Problems Daughter     No Known Problems Maternal Grandmother     No Known Problems Maternal Grandfather     No Known Problems Paternal Grandmother     No Known Problems Paternal Grandfather     No Known Problems Maternal Aunt     No Known Problems Paternal Aunt     No Known Problems Paternal Aunt     No Known Problems Paternal Aunt     No Known Problems Paternal Aunt     No Known Problems Paternal Aunt      Social History:  Social History     Substance and Sexual Activity   Alcohol Use Not Currently    Alcohol/week: 1.0 standard drink of alcohol    Types: 1 Glasses of wine per week     Social History     Substance and Sexual Activity   Drug Use Never     Social History     Tobacco Use   Smoking Status Never    Passive exposure: Never   Smokeless Tobacco Never           Review of Systems:  General- denies fever/chills  HEENT- denies hearing loss or sore throat  Eyes- denies eye pain or visual disturbances, denies red eyes  Respiratory- denies cough or SOB  Cardio- denies chest pain or palpitations  GI- denies abdominal pain  Endocrine- denies urinary frequency  Urinary- denies pain with urination  Musculoskeletal- Negative except noted above  Skin- denies rashes or wounds  Neurological- denies dizziness or headache  Psychiatric- denies anxiety or difficulty concentrating    Objective:   BP Readings from Last 1 Encounters:   09/25/24 150/90      Wt Readings from Last 1 Encounters:   10/11/24 75.3 kg (166 lb)      Pulse Readings from Last 1 Encounters:   09/25/24 90        BMI: Estimated body mass index is 28.49 kg/m² as calculated from the following:    Height as of this encounter: 5' 4\" (1.626 m).    Weight as of this encounter: 75.3 kg (166 lb).    Physical Exam  Ht 5' 4\" (1.626 m)   Wt 75.3 kg (166 lb)   LMP  (LMP Unknown)   BMI 28.49 kg/m²   General/Constitutional: No apparent distress: well-nourished and well developed.  Eyes: normal ocular motion  Cardio: RRR, Normal S1S2, No m/r/g. "   Lymphatic: No appreciable lymphadenopathy  Respiratory: Non-labored breathing, CTA b/l no w/c/r  Vascular: No edema, swelling or tenderness, except as noted in detailed exam. Extremities well perfused. No LE edema  Integumentary: No impressive skin lesions present, except as noted in detailed exam.  Neuro: No ataxia or tremors noted  Psych: Normal mood and affect, oriented to person, place and time. Appropriate affect.  Musculoskeletal: Normal, except as noted in detailed exam and in HPI.    Gait and Station:   normal and antalgic    Right Knee:      Inspection:  normal color, temperature, turgor and moisture    Overall limb alignment: neutral    Effusion: no    ROM 0 to 130 with pain    Extensor Lag: Absent    Palpation: Medial and Lateral joint line tenderness to palpation    stable to AP translation at 90 deg    Coronal plane stable in full extension    Coronal plane stable in mid-flexion     Motor: 5/5 EHL/FHL/TA/GS/Qd/Hs    Vascular: Toes WWP with BCR    Sensory: SILT DP/SP/Oliver/Saph/T      Images:  I personally reviewed relevant images in the PACS system and my interpretation is as follows:    MRI available for review of Right Knee was available for review which demonstrates Complex degenerative tear/maceration posterior horn of the medial meniscus extending to its junction with the body as well as full thickness cartilage wear to the medial compartment. All other compartment appear well preserved.        Scribe Attestation      I,:  Rock Dixon PA-C am acting as a scribe while in the presence of the attending physician.:       I,:  Danny Cardenas MD personally performed the services described in this documentation    as scribed in my presence.:               Danny Cardenas MD  Adult Reconstruction Specialist   [facility]

## 2024-10-11 NOTE — ASSESSMENT & PLAN NOTE
Patient is a Elderly (Approx >66yo) female with functionally limiting knee pain with short distances, functional instability, mechanical symptoms and identified modifiable risk factors.  Radiographic evaluation demonstrates severe degenerative changes in the medial compartment. Physical exam reveals neutral alignment and full range of motion. Given these findings, I recommend:    Right partial vs Total Knee Arthroplasty     - WBAT  - She has failed conservative treatment including anti-inflammatories, activity modification, HEP, CSI injections  - Consent signed. All questions and concerns addressed  - F/u in 2 weeks post op    PARTIAL VERSUS TOTAL KNEE REPLACEMENT INDICATIONS AND RISKS    We had a lengthy discussion with the patient regarding the potential options for treatment. The patient has had an extensive conservative management course up until this time and therefore I do not feel that any additional conservative management will provide additional relief. The patient's symptoms have progressively worsened to the point where they now limit the patient's daily activities and quality of life.     At this time, I feel that this patient would be an excellent candidate for a knee replacement. The patient has failed non-operative care and continues to have unacceptable symptoms. We discussed the treatment options and alternatives and the risks and benefits of surgery in great detail.    While no guarantees can be made, knee replacement has a very high success rate in terms of relieving a patient's knee pain and returning them to a more active, independent lifestyle for 10-15 years or more. All surgery carries some risk; for knee replacement, the complication rate is low but may include: death (very rare), infection, bleeding requiring transfusion, blood clots in legs traveling to lungs, nerve and/or blood vessel damage, bone fracture, persistent knee pain and/or stiffness, and repeat surgery(ies). The risk of a  major complication is about 1-2 per 1000 cases. Knee replacement should only be done if conservative treatment has failed. The revision rate for knee replacements is about 1-2% per year; in other words, 85-95% of knee replacements may last 10 years; 75-85% last 20 years; and so on, assuming no injury to the knee. Finally we discussed anesthesia related complications which will be discussed in greater detail with the anesthesia team before surgery.     The patients current BMI is Body mass index is 28.49 kg/m². .Patient was counseled about the importance of weight loss prior to surgery, IF BMI is >35 prior to surgery my recommendation is that the patient considers nutritional consultation and further weight loss prior to surgical management. These recommendations were discussed with the patient.    If the patient is a smoker, I discussed the importance of quitting smoking to decrease risk of infection postoperatively and promote good wound healing.     The patient was shown knee replacement booklets, diagrams and/or models and all of their questions have been answered at the present time. The patient may call or come in if they have any other concerns or questions. The patient also understands the post-operative rehabilitation process and the need for their cooperation and participation, and that their results may be compromised by their lack of compliance. The patient would like to proceed. Patient is encouraged to seek additional opinions if they so desire. The patient voiced their understanding of the surgical plan and potential complications and wishes to proceed with surgery.

## 2024-10-11 NOTE — PATIENT INSTRUCTIONS
Danny Cardenas MD  Adult Reconstruction Specialist  Department of Orthopedic Surgery  [facility]        Office Locations:   [facility]  [address] [place] [address]  Phone [phone]    [facility]  [address] [place] [address]  Phone: [phone]        TOTAL Knee REPLACEMENT            PREPARING FOR SURGERY    Pre-Operative Medical Clearance:  Typically done 2-3 weeks before surgery by the [facility] ([facility]). The clinic will call you to schedule this appointment.   This visit includes general check up with Bloodwork, EKG, possible COVID test and any other test deemed necessary.  If you see any specialist for other health conditions (cardiologist, pulmonologist, oncologist, hematologist, etc.), you might be required to obtain clearance before scheduling surgery.  One week prior to surgery, you need to stop   all anti-inflammatories (Ibuprofen, Aleve, Celebrex, Meloxicam)   All blood thinners (Coumadin, Warfarin, Plavix, Xarelto, etc.). Contact your cardiologist to advise on need for bridging medication during that period.    Pre-Operative Authorization:  Our office will contact your insurance carrier at the time of scheduling to obtain pre-authorization of your surgery. If authorization is not approved 1-2 days before surgery, you will be contacted to reschedule your procedure for a later date.   You may also contact your insurance carrier prior to surgery to get an estimated out-of-pocket expense of your scheduled procedure.     Surgery Time and Follow-up  Our office will contact you 2-3 days before your surgery date to inform you about the exact arrival time.  Our office will schedule the first post op appointment as well. This will be 10-14 days after surgery.  Your second follow up is around 6 weeks after surgery, and Xray will be taken during that visit.    Pain Medications and Pain  Management  If you have been taking opioids/narcotics from a pain management or another doctor, you will need to obtain adequate pain pills from that doctor. Please make those arrangements before surgery to have what you need for recovery.  We will make every effort to have your prescription sent to the pharmacy 2-3 days prior to surgery or on the same day of surgery. Please verify the correct pharmacy location with office staff as this cannot be changed once narcotics are sent.    Assistance  Majority of patients will go home same day of surgery. Make arrangements for a family member or friend to drive you home.  Most patients will only need someone around to assist them for the first few days after surgery.  In certain situations, you may stay overnight in the hospital and be discharged home the next day with home health services if needed.  If you live more than 2 hours away from the hospital, you will stay overnight in the hospital and be discharged home the next day.  It may take you 3-4 weeks before felling comfortable driving.    Dental Work  Complete any dental work prior to scheduling surgery.  This includes cleaning, fillings, extractions or treatment of any dental infection.  Failure to take care of oral health prior to your surgery could delay your surgery as a dental infection could put you at risk of surgical infection.    Work / FMLA forms  Please contact our office to complete any required form.  Allow 5-7 days for completion.          INSTRUCTIONS FOR THE DAY BEFORE AND MORNING OF SURGERY      Day Before Surgery:  Unless you have history allergy to acetaminophen (Tylenol) or history of liver disease, please take 2 Tylenol 500mg tablets every 6-8 hours for a total of 3 times only. Do not exceed 3000mg in a 24-hour period.  Wash with Hibiclens (Chlorhexidine soap) provided by Anesthesia Clinic in the evening.  DO NOT EAT OR DRINK ANYTHING AFTER MIDNIGHT THE NIGHT BEFORE SURGERY, REGARDLESS OF YOUR  SURGERY TIME, unless specifically instructed so by anesthesia.  Try to change bed sheets the night before surgery.  Get a good night's sleep !!!    Morning of Surgery:  DO NOT EAT OR DRINK ANYTHING UNLESS INSTRUCTED BY [facility]ES[facility] CLINIC.  Wash again with Hibiclens.  Arrive at the hospital at your given time.  Go the registration desk for check-in. Have your photo ID and insurance card.  The hospital will arrange for all Durable Medical Equipment (Walker, Toilet Seat, etc.)

## 2024-10-14 ENCOUNTER — TELEPHONE (OUTPATIENT)
Dept: FAMILY MEDICINE CLINIC | Facility: CLINIC | Age: 74
End: 2024-10-14

## 2024-10-15 ENCOUNTER — TELEPHONE (OUTPATIENT)
Dept: OBGYN CLINIC | Facility: HOSPITAL | Age: 74
End: 2024-10-15

## 2024-10-17 ENCOUNTER — TELEMEDICINE (OUTPATIENT)
Dept: FAMILY MEDICINE CLINIC | Facility: CLINIC | Age: 74
End: 2024-10-17

## 2024-10-17 DIAGNOSIS — M17.11 PRIMARY OSTEOARTHRITIS OF RIGHT KNEE: Primary | ICD-10-CM

## 2024-10-17 PROCEDURE — 99213 OFFICE O/P EST LOW 20 MIN: CPT

## 2024-10-17 PROCEDURE — G2211 COMPLEX E/M VISIT ADD ON: HCPCS

## 2024-10-17 NOTE — TELEPHONE ENCOUNTER
#[id number]    Preoperative Elective Admission Assessment    Living Situation:    Who does pt live with:  daughter and grandson  in second floor apartment  What kind of home: multi-level  How do they enter the home: front  How many levels in home: 2   # of steps to enter home: 8  # of steps to second floor: 8  Are there handrails: Yes  Are there landings: No  Sleeping arrangement: second floor  Where is Bathroom: second floor  Where is the tub or shower: step in bath tub  Dogs or ther pets: n/a     First Floor Setup:   Is there a bathroom: No  Where would pt sleep:  lives on second floor     DME: rolling walker. Instructed to bring RW DOS  We discussed clearing pathways in the home and making sure there is accessibly to use the walker, for example, removing throw rugs.      Patient's Current Level of Function: Ambulates with walker and ADLs: Independent    Post-op Caregiver: child  Caregiver Name and phone number for Inpatient discharge needs: daughter  Currently receive any HHC/aides/community supports: No     Post-op Transport: child  To/from hospital: child  To/from PT 2-3x/week: child  Uses community transport now: No     Outpatient Physical Therapy Site:  Site: Not ordered by surgeon   pre and post-op appts scheduled? No     Medication Management: self, pillbox, and out of bottle  Preferred Pharmacy for Post-op Medications: [facility] in [place]  Blood Management Vitamin Regimen:  Pt having her daughter pick them up today at 10/17  Post-op anticoagulant: to be determined by surgical team postoperatively     DC Plan: Pt plans to be discharged home    Barriers to DC identified preoperatively: none identified    BMI: 28.49    Patient Education:  Pt educated on post-op pain, early mobilization (POD0), LOS goals, OP PT goals, and preoperative bathing. Patient educated that our goal is to appropriately discharge patient based off their post-op function while striving to maintain maximal independence. The  goal is to discharge patient to home and for them to attend outpatient physical therapy.    Assigned to care team? Yes

## 2024-10-17 NOTE — PROGRESS NOTES
Virtual Regular Visit  Name: Shraddha Frazier      : 1950      MRN: [id number]  Encounter Provider: MINDY Teague  Encounter Date: 10/17/2024   Encounter department: [facility]    Verification of patient location:    Patient is located at Home in the following state in which I hold an active license [place]    Assessment & Plan  Primary osteoarthritis of right knee  FMLA form for continuous leave completed to cover patient from 24-25            Encounter provider MINDY Teague    The patient was identified by name and date of birth. Shraddha Frazier was informed that this is a telemedicine visit and that the visit is being conducted through the [employer] Teams platform. She agrees to proceed..  My office door was closed. No one else was in the room.  She acknowledged consent and understanding of privacy and security of the video platform. The patient has agreed to participate and understands they can discontinue the visit at any time.    Patient is aware this is a billable service.     History of Present Illness     Shraddha Frazier is a 74 y.o. female  has a past medical history of Chronic cough, Decreased hearing, left, Echocardiogram abnormal, History of mammogram, History of mammogram, HTN (hypertension), Osteopenia determined by x-ray, Primary osteoarthritis of left knee, and Primary osteoarthritis of right knee.  has a past surgical history that includes Hysterectomy and Bladder suspension.    This visit was scheduled to discuss FMLA paperwork. Clarification was needed for length of continuous fmla leave given that she has an upcoming right knee replacement surgery due to OA on 24.        History obtained from : patient  Review of Systems   Constitutional:  Negative for chills and fever.   HENT:  Negative for ear pain and sore throat.    Eyes:  Negative for pain and visual disturbance.   Respiratory:  Negative for  cough and shortness of breath.    Cardiovascular:  Negative for chest pain and palpitations.   Gastrointestinal:  Negative for abdominal pain and vomiting.   Genitourinary:  Negative for dysuria and hematuria.   Musculoskeletal:  Positive for arthralgias. Negative for back pain.   Skin:  Negative for color change and rash.   Neurological:  Negative for seizures and syncope.   All other systems reviewed and are negative.          Objective     LMP  (LMP Unknown)   Physical Exam  Constitutional:       Appearance: Normal appearance.   Neurological:      General: No focal deficit present.      Mental Status: She is alert and oriented to person, place, and time. Mental status is at baseline.   Psychiatric:         Mood and Affect: Mood normal.         Behavior: Behavior normal.         Thought Content: Thought content normal.         Judgment: Judgment normal.         Visit Time  Total Visit Duration: 10

## 2024-10-23 ENCOUNTER — RA CDI HCC (OUTPATIENT)
Dept: OTHER | Facility: HOSPITAL | Age: 74
End: 2024-10-23

## 2024-10-24 NOTE — PRE-PROCEDURE INSTRUCTIONS
Pre-Surgery Instructions:   Medication Instructions    acetaminophen (TYLENOL) 650 mg CR tablet Uses PRN- OK to take day of surgery    alendronate (FOSAMAX) 70 mg tablet Take day of surgery.    amLODIPine (NORVASC) 10 mg tablet Take day of surgery.    ascorbic acid (VITAMIN C) 500 MG tablet Hold day of surgery.    atorvastatin (LIPITOR) 40 mg tablet Take day of surgery.    baclofen 10 mg tablet Take day of surgery.    Calcium Carb-Cholecalciferol 600-10 MG-MCG TABS Stop taking 7 days prior to surgery.    cholecalciferol (VITAMIN D3) 1,000 units tablet Hold day of surgery.    cycloSPORINE (RESTASIS) 0.05 % ophthalmic emulsion Take day of surgery.    Diclofenac Sodium (VOLTAREN) 1 % Take day of surgery.    diclofenac sodium (VOLTAREN) 50 mg EC tablet Stop taking 7 days prior to surgery.    famotidine (PEPCID) 20 mg tablet Take day of surgery.    ferrous sulfate 324 (65 Fe) mg Hold day of surgery.    FML Forte 0.25 % ophthalmic suspension Take day of surgery.    folic acid (FOLVITE) 1 mg tablet Hold day of surgery.    lidocaine (LMX) 4 % cream Take day of surgery.    losartan (COZAAR) 100 MG tablet Hold day of surgery.    methocarbamol (ROBAXIN) 500 mg tablet Uses PRN- OK to take day of surgery    Multiple Vitamins-Minerals (multivitamin with minerals) tablet Hold day of surgery.    olopatadine (PATANOL) 0.1 % ophthalmic solution Take day of surgery.    ALL instructions given with [employer]icia  using Ethiopian translation.    Ortho instructions CHG wipes and CHG soap,   Instructed to bring walker and rubber soled shoes for physical therapy  Reviewed deep breathing techniques  See Geriatric Assessment below...  Cognitive Assessment:   CAM:   TUG <15 sec:  Falls (last 6 months): no  Hand  score:  -Attempt 1:  -Attempt 2:  -Attempt 3:  Kleber Total Score: 23  PHQ- 9 Depression Scale:0  Nutrition Assessment Score:13  METS:8.27  Incentive Spirometry Level:   Health goals:  -What are your overall health  goals? (quit smoking, wt. loss, rest, decrease stress)To feel better, no pain     -What brings you strength? (family, friends, Christianity, health)  god  -What activities are important to you? (exercise, reading, travel, work)  To return to work    Medication instructions for day surgery reviewed. Please use only a sip of water to take your instructed medications. Avoid all over the counter vitamins, supplements and NSAIDS for one week prior to surgery per anesthesia guidelines. Tylenol is ok to take as needed.     You will receive a call one business day prior to surgery with an arrival time and hospital directions. If your surgery is scheduled on a Monday, the hospital will be calling you on the Friday prior to your surgery. If you have not heard from anyone by 8pm, please call the hospital supervisor through the hospital  at [phone]. ([place] [phone] or [place] [phone]).    Do not eat or drink anything after midnight the night before your surgery, including candy, mints, lifesavers, or chewing gum. Do not drink alcohol 24hrs before your surgery. Try not to smoke at least 24hrs before your surgery.       Follow the pre surgery showering instructions as listed in the “My Surgical Experience Booklet” or otherwise provided by your surgeon's office. Do not use a blade to shave the surgical area 1 week before surgery. It is okay to use a clean electric clippers up to 24 hours before surgery. Do not apply any lotions, creams, including makeup, cologne, deodorant, or perfumes after showering on the day of your surgery. Do not use dry shampoo, hair spray, hair gel, or any type of hair products.     No contact lenses, eye make-up, or artificial eyelashes. Remove nail polish, including gel polish, and any artificial, gel, or acrylic nails if possible. Remove all jewelry including rings and body piercing jewelry.     Wear causal clothing that is easy to take on and off. Consider your type of  surgery.    Keep any valuables, jewelry, piercings at home. Please bring any specially ordered equipment (sling, braces) if indicated.    Arrange for a responsible person to drive you to and from the hospital on the day of your surgery. Please confirm the visitor policy for the day of your procedure when you receive your phone call with an arrival time.     Call the surgeon's office with any new illnesses, exposures, or additional questions prior to surgery.    Please reference your “My Surgical Experience Booklet” for additional information to prepare for your upcoming surgery.

## 2024-10-26 ENCOUNTER — APPOINTMENT (OUTPATIENT)
Dept: LAB | Facility: HOSPITAL | Age: 74
End: 2024-10-26
Payer: COMMERCIAL

## 2024-10-26 ENCOUNTER — OFFICE VISIT (OUTPATIENT)
Dept: LAB | Facility: HOSPITAL | Age: 74
End: 2024-10-26
Payer: COMMERCIAL

## 2024-10-26 DIAGNOSIS — M17.11 PRIMARY OSTEOARTHRITIS OF RIGHT KNEE: ICD-10-CM

## 2024-10-26 DIAGNOSIS — T14.8XXA JOINT INJURY: ICD-10-CM

## 2024-10-26 DIAGNOSIS — M17.11 PRIMARY OSTEOARTHRITIS OF RIGHT KNEE: Primary | ICD-10-CM

## 2024-10-26 LAB
ALBUMIN SERPL BCG-MCNC: 4.2 G/DL (ref 3.5–5)
ALP SERPL-CCNC: 44 U/L (ref 34–104)
ALT SERPL W P-5'-P-CCNC: 14 U/L (ref 7–52)
ANION GAP SERPL CALCULATED.3IONS-SCNC: 7 MMOL/L (ref 4–13)
APTT PPP: 27 SECONDS (ref 23–34)
AST SERPL W P-5'-P-CCNC: 17 U/L (ref 13–39)
BASOPHILS # BLD AUTO: 0.08 THOUSANDS/ΜL (ref 0–0.1)
BASOPHILS NFR BLD AUTO: 1 % (ref 0–1)
BILIRUB SERPL-MCNC: 0.8 MG/DL (ref 0.2–1)
BUN SERPL-MCNC: 10 MG/DL (ref 5–25)
CALCIUM SERPL-MCNC: 9.4 MG/DL (ref 8.4–10.2)
CHLORIDE SERPL-SCNC: 105 MMOL/L (ref 96–108)
CO2 SERPL-SCNC: 30 MMOL/L (ref 21–32)
CREAT SERPL-MCNC: 0.61 MG/DL (ref 0.6–1.3)
EOSINOPHIL # BLD AUTO: 0.1 THOUSAND/ΜL (ref 0–0.61)
EOSINOPHIL NFR BLD AUTO: 2 % (ref 0–6)
ERYTHROCYTE [DISTWIDTH] IN BLOOD BY AUTOMATED COUNT: 13.2 % (ref 11.6–15.1)
EST. AVERAGE GLUCOSE BLD GHB EST-MCNC: 114 MG/DL
GFR SERPL CREATININE-BSD FRML MDRD: 89 ML/MIN/1.73SQ M
GLUCOSE P FAST SERPL-MCNC: 105 MG/DL (ref 65–99)
HBA1C MFR BLD: 5.6 %
HCT VFR BLD AUTO: 39.6 % (ref 34.8–46.1)
HGB BLD-MCNC: 13.8 G/DL (ref 11.5–15.4)
IMM GRANULOCYTES # BLD AUTO: 0.01 THOUSAND/UL (ref 0–0.2)
IMM GRANULOCYTES NFR BLD AUTO: 0 % (ref 0–2)
INR PPP: 0.99 (ref 0.85–1.19)
LYMPHOCYTES # BLD AUTO: 2 THOUSANDS/ΜL (ref 0.6–4.47)
LYMPHOCYTES NFR BLD AUTO: 34 % (ref 14–44)
MCH RBC QN AUTO: 31.4 PG (ref 26.8–34.3)
MCHC RBC AUTO-ENTMCNC: 34.8 G/DL (ref 31.4–37.4)
MCV RBC AUTO: 90 FL (ref 82–98)
MONOCYTES # BLD AUTO: 0.48 THOUSAND/ΜL (ref 0.17–1.22)
MONOCYTES NFR BLD AUTO: 8 % (ref 4–12)
NEUTROPHILS # BLD AUTO: 3.18 THOUSANDS/ΜL (ref 1.85–7.62)
NEUTS SEG NFR BLD AUTO: 55 % (ref 43–75)
NRBC BLD AUTO-RTO: 0 /100 WBCS
PLATELET # BLD AUTO: 252 THOUSANDS/UL (ref 149–390)
PMV BLD AUTO: 10.3 FL (ref 8.9–12.7)
POTASSIUM SERPL-SCNC: 3.9 MMOL/L (ref 3.5–5.3)
PROT SERPL-MCNC: 8.4 G/DL (ref 6.4–8.4)
PROTHROMBIN TIME: 13.4 SECONDS (ref 12.3–15)
RBC # BLD AUTO: 4.4 MILLION/UL (ref 3.81–5.12)
SODIUM SERPL-SCNC: 142 MMOL/L (ref 135–147)
WBC # BLD AUTO: 5.85 THOUSAND/UL (ref 4.31–10.16)

## 2024-10-26 PROCEDURE — 85730 THROMBOPLASTIN TIME PARTIAL: CPT

## 2024-10-26 PROCEDURE — 86900 BLOOD TYPING SEROLOGIC ABO: CPT

## 2024-10-26 PROCEDURE — 86850 RBC ANTIBODY SCREEN: CPT

## 2024-10-26 PROCEDURE — 93005 ELECTROCARDIOGRAM TRACING: CPT

## 2024-10-26 PROCEDURE — 80053 COMPREHEN METABOLIC PANEL: CPT

## 2024-10-26 PROCEDURE — 83036 HEMOGLOBIN GLYCOSYLATED A1C: CPT

## 2024-10-26 PROCEDURE — 85610 PROTHROMBIN TIME: CPT

## 2024-10-26 PROCEDURE — 36415 COLL VENOUS BLD VENIPUNCTURE: CPT

## 2024-10-26 PROCEDURE — 86901 BLOOD TYPING SEROLOGIC RH(D): CPT

## 2024-10-26 PROCEDURE — 85025 COMPLETE CBC W/AUTO DIFF WBC: CPT

## 2024-10-27 ENCOUNTER — LAB REQUISITION (OUTPATIENT)
Dept: LAB | Facility: HOSPITAL | Age: 74
End: 2024-10-27
Payer: COMMERCIAL

## 2024-10-27 DIAGNOSIS — M17.11 UNILATERAL PRIMARY OSTEOARTHRITIS, RIGHT KNEE: ICD-10-CM

## 2024-10-27 DIAGNOSIS — T14.8XXA OTHER INJURY OF UNSPECIFIED BODY REGION, INITIAL ENCOUNTER: ICD-10-CM

## 2024-10-27 LAB
ABO GROUP BLD: NORMAL
BLD GP AB SCN SERPL QL: NEGATIVE
RH BLD: POSITIVE
SPECIMEN EXPIRATION DATE: NORMAL

## 2024-10-28 NOTE — H&P (VIEW-ONLY)
Internal Medicine Pre-Operative Evaluation:     Reason for Visit: Pre-operative Evaluation for Risk Stratification and Optimization    Patient ID: Shraddha Frazier is a 74 y.o. female.     Surgery: Arthroplasty of right knee  Referring Provider: Dr. Cardenas      Recommendations to Proceed withSurgery    Patient is considered to be Low risk for Medium risk procedure.     After evaluation and discussion with patient with emphasis that all surgery has some degree of inherent risk it is acknowledged by patient this risk is Acceptable.    Patient is optimized and may proceed with planned procedure.     Assessment    Pre-operative Medical Evaluation for planned surgery  Recommendations as listed in PLAN section below  Contact surgical nurse  navigator with any questions regarding preoperative plan or schedule.      Assessment & Plan  Internal derangement of right knee  Failed outpatient conservative measures  Elected to undergo total joint arthroplasty    Essential hypertension  Continue amlodipine  Age-related osteoporosis without current pathological fracture    Preoperative clearance    Primary osteoarthritis of right knee  As above           Plan:     1. Further preoperative workup as follows:   - none no further testing required may proceed with surgery    2. Preoperative Medication Management Review performed by PAT nursing  YES    3. Patient requires further consultation with:   No Consults Required    4. Discharge Planning / Barriers to Discharge  none identified - patients has post discharge therapy plan in place, transportation arranged for discharge day, adequate family support at home to assist with discharge to home.        Subjective:           History of Present Illness:     Shraddha Frazier is a 74 y.o. female who presents to the office today for a preoperative consultation at the request of surgeon. The patient understands this is an elective procedure and not emergent. They are electing to  undergo planned procedure with an understanding that all surgery has inherent risk. They have worked with their surgeon and failed conservative treatment measures. Today they present for preoperative risk assessment and recommendations for optimization in preparation for surgery.    Pre-op Exam    Pt seen in surgical optimization center for upcoming proposed surgery. They have failed previous conservative measures and have elected surgical intervention.     Pt meets presurgical lab and BMI optimization goals.       Shraddha Frazier has an IN HOSPITAL cardiac risk of RCI RISK CLASS I (0 risk factors, risk of major cardiac compl. appr. 0.5%) based on RCRI calculator    Cardiac Risk Estimation: per the Revised Cardiac Risk Index (Circ. 100:1043, 1999),           Previous history of bleeding disorders or clots?: No  Previous Anesthesia reaction?: No  Prolonged steroid use in the last 6 months?: No    Assessment of Cardiac Risk:   - Unstable or severe angina or MI in the last 6 weeks or history of stent placement in the last year?: No   - Decompensated heart failure (e.g. New onset heart failure, NYHA  Class IV heart failure, or worsening existing heart failure)?: No  - Significant arrhythmias such as high grade AV block, symptomatic ventricular arrhythmia, newly recognized ventricular tachycardia, supraventricular tachycardia with resting heart rate >100, or symptomatic bradycardia?: No  - Severe heart valve disease including aortic stenosis or symptomatic mitral stenosis?: No      Pre-operative Risk Factors:  Elevated-risk surgery: No    History of cerebrovascular disease: No    History of ischemic heart disease: No  Pre-operative treatment with insulin: No  Pre-operative creatinine >2 mg/dL: No    History of congestive heart failure: No    Medications of Perioperative Concern:   ACE inhibitors/ARBs        ROS: No TIA's or unusual headaches, no dysphagia.  No prolonged cough. No dyspnea or chest pain on exertion.   "No abdominal pain, change in bowel habits, black or bloody stools.  No urinary tract or BPH symptoms.  Positive reported pain in arthritic joint. Positive difficulty with gait. No skin rashes or issues.      Objective:    /78   Pulse 79   Ht 5' 4\" (1.626 m)   Wt 73.9 kg (163 lb)   LMP  (LMP Unknown)   BMI 27.98 kg/m²       General Appearance: no distress, conversive  HEENT: PERRLA, conjuctiva normal; oropharynx clear; mucous membranes moist;   Neck:  Supple, no lymphadenopathy or thyromegaly  Lungs: breath sounds normal, normal respiratory effort, no retractions, expiratory effort normal  CV: normal heart sounds S1/S2, PMI normal   ABD: soft non tender, no masses , no hepatic or splenomegaly  EXT: DP pulses intact, no lymphadenopathy, no edema  Skin: normal turgor, normal texture, no rash  Psych: affect normal, mood normal  Neuro: AAOx3        The following portions of the patient's history were reviewed and updated as appropriate: allergies, current medications, past family history, past medical history, past social history, past surgical history and problem list.     Past History:       Past Medical History:   Diagnosis Date    Chronic cough     Decreased hearing, left 05/02/2019    Echocardiogram abnormal 02/01/2016    Mild LVH with normal systolic function EF > 70%. Grade I Diastolic dysfunction. Aortic sclerosis w/o significant stenosis. Mild insufficiency. Mild MR. Mild TR with normal pulmonary artery pressure.     History of mammogram 09/13/2017    Benign    History of mammogram 02/15/2016    Benign    HTN (hypertension)     Hyperlipidemia     Hypertension     Osteopenia determined by x-ray 02/03/2016    osteopenia of the left femoral neck and normal bone marrow density of the left hip total. Osteopenia of the lumbar spine.     Primary osteoarthritis of left knee 06/08/2023    Primary osteoarthritis of right knee 08/07/2020    Past Surgical History:   Procedure Laterality Date    BLADDER SUSPENSION "      HYSTERECTOMY      age 45          Social History     Tobacco Use    Smoking status: Never     Passive exposure: Never    Smokeless tobacco: Never   Vaping Use    Vaping status: Never Used   Substance Use Topics    Alcohol use: Not Currently     Alcohol/week: 1.0 standard drink of alcohol     Types: 1 Glasses of wine per week    Drug use: Never     Family History   Problem Relation Age of Onset    No Known Problems Mother     No Known Problems Father     No Known Problems Sister     No Known Problems Sister     No Known Problems Sister     No Known Problems Sister     No Known Problems Sister     No Known Problems Sister     No Known Problems Sister     No Known Problems Sister     No Known Problems Daughter     No Known Problems Maternal Grandmother     No Known Problems Maternal Grandfather     No Known Problems Paternal Grandmother     No Known Problems Paternal Grandfather     No Known Problems Maternal Aunt     No Known Problems Paternal Aunt     No Known Problems Paternal Aunt     No Known Problems Paternal Aunt     No Known Problems Paternal Aunt     No Known Problems Paternal Aunt           Allergies:     Allergies   Allergen Reactions    Aspirin     Penicillins Other (See Comments) and Rash        Current Medications:     Current Outpatient Medications   Medication Instructions    acetaminophen (TYLENOL) 650 mg, Oral, Every 8 hours PRN    alendronate (FOSAMAX) 70 mg, Oral, Weekly    amLODIPine (NORVASC) 10 mg, Oral, Daily    ascorbic acid (VITAMIN C) 500 mg, Oral, Daily, Start 30 days prior to surgery    atorvastatin (LIPITOR) 40 mg, Oral, Daily    baclofen 10 mg, Oral, 3 times daily PRN    Calcium Carb-Cholecalciferol 600-10 MG-MCG TABS 1 tablet, Oral, Daily    carboxymethylcellulose 1 % ophthalmic solution 1 drop, Ophthalmic, 3 times daily    cholecalciferol (VITAMIN D3) 2,000 Units, Oral, Daily    cycloSPORINE (RESTASIS) 0.05 % ophthalmic emulsion 1 drop, Ophthalmic, Every 12 hours    Diclofenac  "Sodium (VOLTAREN) 2 g, Topical, 4 times daily    diclofenac sodium (VOLTAREN) 50 mg, Oral, 2 times daily    famotidine (PEPCID) 20 mg, Oral, 2 times daily    ferrous sulfate 324 mg, Oral, 2 times daily before meals, Start 30 days prior to surgery    FML Forte 0.25 % ophthalmic suspension     folic acid (FOLVITE) 1 mg, Oral, Daily, Start 30 days prior to surgery    Ketotifen Fumarate (ZADITOR) 0.035 % ophthalmic solution 1 drop, Ophthalmic, 2 times daily    lidocaine (LMX) 4 % cream Topical, 2 times daily PRN    losartan (COZAAR) 100 mg, Oral, Daily    methocarbamol (ROBAXIN) 500 mg, Oral, 2 times daily    methylprednisolone (MEDROL) 4 mg tablet 6 tb po on day 1; 5 tb po on day 2; 4 tb po on day 3; 3 tb po on day 4; 2 tb po on day 5; 1 tb po on day 6    Multiple Vitamins-Minerals (multivitamin with minerals) tablet 1 tablet, Oral, Daily, Start 30 days prior to procedure.    olopatadine (PATANOL) 0.1 % ophthalmic solution 1 drop, Ophthalmic, 2 times daily           PRE-OP WORKSHEET DATA    Assessment of Pre-Operative Risks     MLJ Quality Hard Stops:    BMI (<40) : Estimated body mass index is 27.98 kg/m² as calculated from the following:    Height as of this encounter: 5' 4\" (1.626 m).    Weight as of this encounter: 73.9 kg (163 lb).    Hgb ( >11):   Lab Results   Component Value Date    HGB 13.8 10/26/2024    HGB 13.7 09/18/2024    HGB 13.6 04/11/2023       HbA1c (<7.5) :   Lab Results   Component Value Date    HGBA1C 5.6 10/26/2024       GFR (>60) (Less then 45 = Nephrology consult):    Lab Results   Component Value Date    EGFR 89 10/26/2024    EGFR 92 11/14/2023    EGFR 86 04/11/2023            Pre-Op Data Reviewed:       Laboratory Results: I have personally reviewed the pertinent laboratory results/reports     EKG:I have personally reviewed pertinent reports.  . I personally reviewed and interpreted available tracings in the electronic medical record    Encounter Date: 10/26/24   ECG 12 lead   Result Value    " Ventricular Rate 77    Atrial Rate 77    WA Interval 142    QRSD Interval 128    QT Interval 446    QTC Interval 504    P Axis 66    QRS Axis -3    T Wave Axis 32    Narrative    Sinus rhythm with frequent Premature ventricular complexes  Right bundle branch block  Abnormal ECG  When compared with ECG of 11-APR-2023 20:30,  Premature ventricular complexes are now Present  Confirmed by Александр Simental ([id number]) on 10/30/2024 7:33:23 AM       OLD RECORDS: reviewed old records in the chart review section if EHR on day of visit.    Previous cardiopulmonary studies within the past year:  Echocardiogram: no   Cardiac Catheterization: no  Stress Test: no      Time of visit including pre-visit chart review, visit and post-visit coordination of plan and care , review of pre-surgical lab work, preparation and time spent documenting note in electronic medical record, time spent face-to-face in physical examination answering patient questions by care team 35 minutes             [facility]

## 2024-10-28 NOTE — PROGRESS NOTES
Internal Medicine Pre-Operative Evaluation:     Reason for Visit: Pre-operative Evaluation for Risk Stratification and Optimization    Patient ID: Shraddha Frazier is a 74 y.o. female.     Surgery: Arthroplasty of right knee  Referring Provider: Dr. Cardenas      Recommendations to Proceed withSurgery    Patient is considered to be Low risk for Medium risk procedure.     After evaluation and discussion with patient with emphasis that all surgery has some degree of inherent risk it is acknowledged by patient this risk is Acceptable.    Patient is optimized and may proceed with planned procedure.     Assessment    Pre-operative Medical Evaluation for planned surgery  Recommendations as listed in PLAN section below  Contact surgical nurse  navigator with any questions regarding preoperative plan or schedule.      Assessment & Plan  Internal derangement of right knee  Failed outpatient conservative measures  Elected to undergo total joint arthroplasty    Essential hypertension  Continue amlodipine  Age-related osteoporosis without current pathological fracture    Preoperative clearance    Primary osteoarthritis of right knee  As above           Plan:     1. Further preoperative workup as follows:   - none no further testing required may proceed with surgery    2. Preoperative Medication Management Review performed by PAT nursing  YES    3. Patient requires further consultation with:   No Consults Required    4. Discharge Planning / Barriers to Discharge  none identified - patients has post discharge therapy plan in place, transportation arranged for discharge day, adequate family support at home to assist with discharge to home.        Subjective:           History of Present Illness:     Shraddha Frazier is a 74 y.o. female who presents to the office today for a preoperative consultation at the request of surgeon. The patient understands this is an elective procedure and not emergent. They are electing to  undergo planned procedure with an understanding that all surgery has inherent risk. They have worked with their surgeon and failed conservative treatment measures. Today they present for preoperative risk assessment and recommendations for optimization in preparation for surgery.    Pre-op Exam    Pt seen in surgical optimization center for upcoming proposed surgery. They have failed previous conservative measures and have elected surgical intervention.     Pt meets presurgical lab and BMI optimization goals.       Shraddha Frazier has an IN HOSPITAL cardiac risk of RCI RISK CLASS I (0 risk factors, risk of major cardiac compl. appr. 0.5%) based on RCRI calculator    Cardiac Risk Estimation: per the Revised Cardiac Risk Index (Circ. 100:1043, 1999),           Previous history of bleeding disorders or clots?: No  Previous Anesthesia reaction?: No  Prolonged steroid use in the last 6 months?: No    Assessment of Cardiac Risk:   - Unstable or severe angina or MI in the last 6 weeks or history of stent placement in the last year?: No   - Decompensated heart failure (e.g. New onset heart failure, NYHA  Class IV heart failure, or worsening existing heart failure)?: No  - Significant arrhythmias such as high grade AV block, symptomatic ventricular arrhythmia, newly recognized ventricular tachycardia, supraventricular tachycardia with resting heart rate >100, or symptomatic bradycardia?: No  - Severe heart valve disease including aortic stenosis or symptomatic mitral stenosis?: No      Pre-operative Risk Factors:  Elevated-risk surgery: No    History of cerebrovascular disease: No    History of ischemic heart disease: No  Pre-operative treatment with insulin: No  Pre-operative creatinine >2 mg/dL: No    History of congestive heart failure: No    Medications of Perioperative Concern:   ACE inhibitors/ARBs        ROS: No TIA's or unusual headaches, no dysphagia.  No prolonged cough. No dyspnea or chest pain on exertion.   "No abdominal pain, change in bowel habits, black or bloody stools.  No urinary tract or BPH symptoms.  Positive reported pain in arthritic joint. Positive difficulty with gait. No skin rashes or issues.      Objective:    /78   Pulse 79   Ht 5' 4\" (1.626 m)   Wt 73.9 kg (163 lb)   LMP  (LMP Unknown)   BMI 27.98 kg/m²       General Appearance: no distress, conversive  HEENT: PERRLA, conjuctiva normal; oropharynx clear; mucous membranes moist;   Neck:  Supple, no lymphadenopathy or thyromegaly  Lungs: breath sounds normal, normal respiratory effort, no retractions, expiratory effort normal  CV: normal heart sounds S1/S2, PMI normal   ABD: soft non tender, no masses , no hepatic or splenomegaly  EXT: DP pulses intact, no lymphadenopathy, no edema  Skin: normal turgor, normal texture, no rash  Psych: affect normal, mood normal  Neuro: AAOx3        The following portions of the patient's history were reviewed and updated as appropriate: allergies, current medications, past family history, past medical history, past social history, past surgical history and problem list.     Past History:       Past Medical History:   Diagnosis Date    Chronic cough     Decreased hearing, left 05/02/2019    Echocardiogram abnormal 02/01/2016    Mild LVH with normal systolic function EF > 70%. Grade I Diastolic dysfunction. Aortic sclerosis w/o significant stenosis. Mild insufficiency. Mild MR. Mild TR with normal pulmonary artery pressure.     History of mammogram 09/13/2017    Benign    History of mammogram 02/15/2016    Benign    HTN (hypertension)     Hyperlipidemia     Hypertension     Osteopenia determined by x-ray 02/03/2016    osteopenia of the left femoral neck and normal bone marrow density of the left hip total. Osteopenia of the lumbar spine.     Primary osteoarthritis of left knee 06/08/2023    Primary osteoarthritis of right knee 08/07/2020    Past Surgical History:   Procedure Laterality Date    BLADDER SUSPENSION "      HYSTERECTOMY      age 45          Social History     Tobacco Use    Smoking status: Never     Passive exposure: Never    Smokeless tobacco: Never   Vaping Use    Vaping status: Never Used   Substance Use Topics    Alcohol use: Not Currently     Alcohol/week: 1.0 standard drink of alcohol     Types: 1 Glasses of wine per week    Drug use: Never     Family History   Problem Relation Age of Onset    No Known Problems Mother     No Known Problems Father     No Known Problems Sister     No Known Problems Sister     No Known Problems Sister     No Known Problems Sister     No Known Problems Sister     No Known Problems Sister     No Known Problems Sister     No Known Problems Sister     No Known Problems Daughter     No Known Problems Maternal Grandmother     No Known Problems Maternal Grandfather     No Known Problems Paternal Grandmother     No Known Problems Paternal Grandfather     No Known Problems Maternal Aunt     No Known Problems Paternal Aunt     No Known Problems Paternal Aunt     No Known Problems Paternal Aunt     No Known Problems Paternal Aunt     No Known Problems Paternal Aunt           Allergies:     Allergies   Allergen Reactions    Aspirin     Penicillins Other (See Comments) and Rash        Current Medications:     Current Outpatient Medications   Medication Instructions    acetaminophen (TYLENOL) 650 mg, Oral, Every 8 hours PRN    alendronate (FOSAMAX) 70 mg, Oral, Weekly    amLODIPine (NORVASC) 10 mg, Oral, Daily    ascorbic acid (VITAMIN C) 500 mg, Oral, Daily, Start 30 days prior to surgery    atorvastatin (LIPITOR) 40 mg, Oral, Daily    baclofen 10 mg, Oral, 3 times daily PRN    Calcium Carb-Cholecalciferol 600-10 MG-MCG TABS 1 tablet, Oral, Daily    carboxymethylcellulose 1 % ophthalmic solution 1 drop, Ophthalmic, 3 times daily    cholecalciferol (VITAMIN D3) 2,000 Units, Oral, Daily    cycloSPORINE (RESTASIS) 0.05 % ophthalmic emulsion 1 drop, Ophthalmic, Every 12 hours    Diclofenac  "Sodium (VOLTAREN) 2 g, Topical, 4 times daily    diclofenac sodium (VOLTAREN) 50 mg, Oral, 2 times daily    famotidine (PEPCID) 20 mg, Oral, 2 times daily    ferrous sulfate 324 mg, Oral, 2 times daily before meals, Start 30 days prior to surgery    FML Forte 0.25 % ophthalmic suspension     folic acid (FOLVITE) 1 mg, Oral, Daily, Start 30 days prior to surgery    Ketotifen Fumarate (ZADITOR) 0.035 % ophthalmic solution 1 drop, Ophthalmic, 2 times daily    lidocaine (LMX) 4 % cream Topical, 2 times daily PRN    losartan (COZAAR) 100 mg, Oral, Daily    methocarbamol (ROBAXIN) 500 mg, Oral, 2 times daily    methylprednisolone (MEDROL) 4 mg tablet 6 tb po on day 1; 5 tb po on day 2; 4 tb po on day 3; 3 tb po on day 4; 2 tb po on day 5; 1 tb po on day 6    Multiple Vitamins-Minerals (multivitamin with minerals) tablet 1 tablet, Oral, Daily, Start 30 days prior to procedure.    olopatadine (PATANOL) 0.1 % ophthalmic solution 1 drop, Ophthalmic, 2 times daily           PRE-OP WORKSHEET DATA    Assessment of Pre-Operative Risks     MLJ Quality Hard Stops:    BMI (<40) : Estimated body mass index is 27.98 kg/m² as calculated from the following:    Height as of this encounter: 5' 4\" (1.626 m).    Weight as of this encounter: 73.9 kg (163 lb).    Hgb ( >11):   Lab Results   Component Value Date    HGB 13.8 10/26/2024    HGB 13.7 09/18/2024    HGB 13.6 04/11/2023       HbA1c (<7.5) :   Lab Results   Component Value Date    HGBA1C 5.6 10/26/2024       GFR (>60) (Less then 45 = Nephrology consult):    Lab Results   Component Value Date    EGFR 89 10/26/2024    EGFR 92 11/14/2023    EGFR 86 04/11/2023            Pre-Op Data Reviewed:       Laboratory Results: I have personally reviewed the pertinent laboratory results/reports     EKG:I have personally reviewed pertinent reports.  . I personally reviewed and interpreted available tracings in the electronic medical record    Encounter Date: 10/26/24   ECG 12 lead   Result Value    " Ventricular Rate 77    Atrial Rate 77    DC Interval 142    QRSD Interval 128    QT Interval 446    QTC Interval 504    P Axis 66    QRS Axis -3    T Wave Axis 32    Narrative    Sinus rhythm with frequent Premature ventricular complexes  Right bundle branch block  Abnormal ECG  When compared with ECG of 11-APR-2023 20:30,  Premature ventricular complexes are now Present  Confirmed by Александр Simental ([id number]) on 10/30/2024 7:33:23 AM       OLD RECORDS: reviewed old records in the chart review section if EHR on day of visit.    Previous cardiopulmonary studies within the past year:  Echocardiogram: no   Cardiac Catheterization: no  Stress Test: no      Time of visit including pre-visit chart review, visit and post-visit coordination of plan and care , review of pre-surgical lab work, preparation and time spent documenting note in electronic medical record, time spent face-to-face in physical examination answering patient questions by care team 35 minutes             [facility]

## 2024-10-28 NOTE — PATIENT INSTRUCTIONS
BEFORE SURGERY    Contact your surgical nurse  navigator with any questions regarding preoperative plan or schedule.  Stop all over the counter supplements, herbal, naturopathic  medications for 1 week prior to surgery UNLESS prescribed by your surgeon  Hold NSAIDS (i.e. advil, alleve, motrin, ibuprofen, celebrex) minimum 5 days prior to surgery  Follow presurgical medication instructions provided by preadmission nursing team reviewed during your presurgery phone call  Strategies for optimizing your surgery through breathing exercises, nutrition and physical activity can be found at [email or web address]  Call [phone] with any presurgical concerns or medications questions or use the messaging feature in your [employer] rukhsana to contact your provider    AFTER SURGERY    Recommend using Tylenol ( acetaminophen ) 1000 mg every eight hours during the first week post discharge along with icing the area for 20 mins every 3-4 hours while awake can be helpful in reducing your need for post operative opioid use. This opioid sparing plan can be used along side your surgeons pain plan.  Use stool softener over the counter (colace) daily after surgery during the first 1-2 weeks to avoid post operative constipation issues  If no bowel movement within 3 days after surgery then use over the counter Miralax in addition to your stool softener   If cleared by your surgical team for activity then early and often walking is encouraged and can be important in prevention of post surgical blood clots. Additionally spend as much time out of bed as possible and allowed by your surgical team  Use your incentive spirometer twice per hour in the first seven days after surgery to help prevent post surgery lung complications and infections  It is very important you follow the instructions from your surgeon regarding any medications for after surgery blood clot prevention. Compliance with these medications is very important.  Call [phone] with  any post discharge concerns or medical issues or use the messaging feature in your [employer] rukhsana to contact your provider

## 2024-10-30 ENCOUNTER — OFFICE VISIT (OUTPATIENT)
Age: 74
End: 2024-10-30
Payer: COMMERCIAL

## 2024-10-30 ENCOUNTER — ANESTHESIA EVENT (OUTPATIENT)
Age: 74
End: 2024-10-30
Payer: COMMERCIAL

## 2024-10-30 VITALS
HEIGHT: 64 IN | WEIGHT: 163 LBS | HEART RATE: 79 BPM | BODY MASS INDEX: 27.83 KG/M2 | SYSTOLIC BLOOD PRESSURE: 144 MMHG | DIASTOLIC BLOOD PRESSURE: 78 MMHG

## 2024-10-30 DIAGNOSIS — M17.11 PRIMARY OSTEOARTHRITIS OF RIGHT KNEE: ICD-10-CM

## 2024-10-30 DIAGNOSIS — I10 ESSENTIAL HYPERTENSION: ICD-10-CM

## 2024-10-30 DIAGNOSIS — M81.0 AGE-RELATED OSTEOPOROSIS WITHOUT CURRENT PATHOLOGICAL FRACTURE: ICD-10-CM

## 2024-10-30 DIAGNOSIS — Z01.818 PREOPERATIVE CLEARANCE: Primary | ICD-10-CM

## 2024-10-30 DIAGNOSIS — M23.91 INTERNAL DERANGEMENT OF RIGHT KNEE: ICD-10-CM

## 2024-10-30 LAB
ATRIAL RATE: 77 BPM
P AXIS: 66 DEGREES
PR INTERVAL: 142 MS
QRS AXIS: -3 DEGREES
QRSD INTERVAL: 128 MS
QT INTERVAL: 446 MS
QTC INTERVAL: 504 MS
T WAVE AXIS: 32 DEGREES
VENTRICULAR RATE: 77 BPM

## 2024-10-30 PROCEDURE — 93010 ELECTROCARDIOGRAM REPORT: CPT | Performed by: INTERNAL MEDICINE

## 2024-10-30 PROCEDURE — 99215 OFFICE O/P EST HI 40 MIN: CPT | Performed by: INTERNAL MEDICINE

## 2024-10-30 RX ORDER — MUPIROCIN 20 MG/G
OINTMENT TOPICAL 2 TIMES DAILY
Qty: 22 G | Refills: 0 | Status: SHIPPED | OUTPATIENT
Start: 2024-10-30 | End: 2024-11-04
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
## 2024-11-04 ENCOUNTER — TELEPHONE (OUTPATIENT)
Age: 74
End: 2024-11-04

## 2024-11-04 NOTE — TELEPHONE ENCOUNTER
Patient stated that On 10/30/24 appt with   Ruy De León,    For Pre-operative Evaluation was told to take   Vaseline for nose to take 5 days before surgery and a rx was not sent to pharmacy [id number] - [place] - [address] pharmacy stated the pharmacy wont give her medication cause she needs to have some type of paper script or the medication called in   Please contact patient and or pharmacy to verify     Danny Cardenas MD  Orthopedic Surgery

## 2024-11-04 NOTE — TELEPHONE ENCOUNTER
Called and spoke with patient (a male  who spoke English was also on the line). I informed them that the Mupirocin is for surgeries (totals) after 12/1/24. Patient does not need it. They understood.

## 2024-11-13 ENCOUNTER — ANESTHESIA (OUTPATIENT)
Age: 74
End: 2024-11-13
Payer: COMMERCIAL

## 2024-11-13 ENCOUNTER — HOSPITAL ENCOUNTER (OUTPATIENT)
Age: 74
Setting detail: OUTPATIENT SURGERY
Discharge: HOME | End: 2024-11-13
Attending: STUDENT IN AN ORGANIZED HEALTH CARE EDUCATION/TRAINING PROGRAM | Admitting: STUDENT IN AN ORGANIZED HEALTH CARE EDUCATION/TRAINING PROGRAM
Payer: COMMERCIAL

## 2024-11-13 ENCOUNTER — APPOINTMENT (OUTPATIENT)
Age: 74
End: 2024-11-13
Payer: COMMERCIAL

## 2024-11-13 VITALS
HEIGHT: 64 IN | HEART RATE: 76 BPM | WEIGHT: 161.8 LBS | RESPIRATION RATE: 17 BRPM | TEMPERATURE: 98.3 F | DIASTOLIC BLOOD PRESSURE: 71 MMHG | OXYGEN SATURATION: 93 % | SYSTOLIC BLOOD PRESSURE: 111 MMHG | BODY MASS INDEX: 27.62 KG/M2

## 2024-11-13 DIAGNOSIS — M17.11 PRIMARY OSTEOARTHRITIS OF RIGHT KNEE: Primary | ICD-10-CM

## 2024-11-13 LAB — GLUCOSE SERPL-MCNC: 95 MG/DL (ref 65–140)

## 2024-11-13 PROCEDURE — C1776 JOINT DEVICE (IMPLANTABLE): HCPCS | Performed by: STUDENT IN AN ORGANIZED HEALTH CARE EDUCATION/TRAINING PROGRAM

## 2024-11-13 PROCEDURE — 97535 SELF CARE MNGMENT TRAINING: CPT

## 2024-11-13 PROCEDURE — 97530 THERAPEUTIC ACTIVITIES: CPT | Performed by: PHYSICAL THERAPIST

## 2024-11-13 PROCEDURE — 73560 X-RAY EXAM OF KNEE 1 OR 2: CPT

## 2024-11-13 PROCEDURE — 27446 REVISION OF KNEE JOINT: CPT

## 2024-11-13 PROCEDURE — C9290 INJ, BUPIVACAINE LIPOSOME: HCPCS | Performed by: ANESTHESIOLOGY

## 2024-11-13 PROCEDURE — 97163 PT EVAL HIGH COMPLEX 45 MIN: CPT | Performed by: PHYSICAL THERAPIST

## 2024-11-13 PROCEDURE — 97167 OT EVAL HIGH COMPLEX 60 MIN: CPT

## 2024-11-13 PROCEDURE — C1713 ANCHOR/SCREW BN/BN,TIS/BN: HCPCS | Performed by: STUDENT IN AN ORGANIZED HEALTH CARE EDUCATION/TRAINING PROGRAM

## 2024-11-13 PROCEDURE — 82948 REAGENT STRIP/BLOOD GLUCOSE: CPT

## 2024-11-13 PROCEDURE — 27446 REVISION OF KNEE JOINT: CPT | Performed by: STUDENT IN AN ORGANIZED HEALTH CARE EDUCATION/TRAINING PROGRAM

## 2024-11-13 DEVICE — SMARTSET HIGH PERFORMANCE MV MEDIUM VISCOSITY BONE CEMENT 40G
Type: IMPLANTABLE DEVICE | Site: KNEE | Status: FUNCTIONAL
Brand: SMARTSET

## 2024-11-13 DEVICE — E-CROSS TIBIAL INSERT FIX S1 RM - 8MM
Type: IMPLANTABLE DEVICE | Site: KNEE | Status: FUNCTIONAL
Brand: MOTO PARTIAL KNEE SYSTEM

## 2024-11-13 DEVICE — ANATOMICAL FEMORAL COMPONENT CEMENTED S2 RM
Type: IMPLANTABLE DEVICE | Site: KNEE | Status: FUNCTIONAL
Brand: MOTO PARTIAL KNEE SYSTEM - MEDIAL

## 2024-11-13 RX ORDER — ROPIVACAINE HYDROCHLORIDE 5 MG/ML
INJECTION, SOLUTION EPIDURAL; INFILTRATION; PERINEURAL AS NEEDED
Status: DISCONTINUED | OUTPATIENT
Start: 2024-11-13 | End: 2024-11-13 | Stop reason: HOSPADM

## 2024-11-13 RX ORDER — PROPOFOL 10 MG/ML
INJECTION, EMULSION INTRAVENOUS CONTINUOUS PRN
Status: DISCONTINUED | OUTPATIENT
Start: 2024-11-13 | End: 2024-11-13

## 2024-11-13 RX ORDER — CEFAZOLIN SODIUM 2 G/50ML
2000 SOLUTION INTRAVENOUS ONCE
Status: COMPLETED | OUTPATIENT
Start: 2024-11-13 | End: 2024-11-13

## 2024-11-13 RX ORDER — FENTANYL CITRATE/PF 50 MCG/ML
50 SYRINGE (ML) INJECTION
Status: DISCONTINUED | OUTPATIENT
Start: 2024-11-13 | End: 2024-11-13 | Stop reason: HOSPADM

## 2024-11-13 RX ORDER — OXYCODONE HYDROCHLORIDE 5 MG/1
5 TABLET ORAL EVERY 4 HOURS PRN
Qty: 30 TABLET | Refills: 0 | Status: SHIPPED | OUTPATIENT
Start: 2024-11-13

## 2024-11-13 RX ORDER — PHENYLEPHRINE HCL IN 0.9% NACL 1 MG/10 ML
SYRINGE (ML) INTRAVENOUS AS NEEDED
Status: DISCONTINUED | OUTPATIENT
Start: 2024-11-13 | End: 2024-11-13

## 2024-11-13 RX ORDER — PROPOFOL 10 MG/ML
INJECTION, EMULSION INTRAVENOUS AS NEEDED
Status: DISCONTINUED | OUTPATIENT
Start: 2024-11-13 | End: 2024-11-13

## 2024-11-13 RX ORDER — LOSARTAN POTASSIUM 25 MG/1
100 TABLET ORAL DAILY
Status: DISCONTINUED | OUTPATIENT
Start: 2024-11-13 | End: 2024-11-13 | Stop reason: HOSPADM

## 2024-11-13 RX ORDER — ASPIRIN 81 MG/1
81 TABLET ORAL 2 TIMES DAILY
Qty: 84 TABLET | Refills: 0 | Status: SHIPPED | OUTPATIENT
Start: 2024-11-13 | End: 2024-12-25

## 2024-11-13 RX ORDER — BUPIVACAINE HYDROCHLORIDE 5 MG/ML
INJECTION, SOLUTION EPIDURAL; INTRACAUDAL
Status: COMPLETED | OUTPATIENT
Start: 2024-11-13 | End: 2024-11-13

## 2024-11-13 RX ORDER — OXYCODONE HYDROCHLORIDE 5 MG/1
5 TABLET ORAL EVERY 4 HOURS PRN
Status: DISCONTINUED | OUTPATIENT
Start: 2024-11-13 | End: 2024-11-13 | Stop reason: HOSPADM

## 2024-11-13 RX ORDER — HYDROMORPHONE HCL IN WATER/PF 6 MG/30 ML
0.2 PATIENT CONTROLLED ANALGESIA SYRINGE INTRAVENOUS
Status: DISCONTINUED | OUTPATIENT
Start: 2024-11-13 | End: 2024-11-13 | Stop reason: HOSPADM

## 2024-11-13 RX ORDER — CALCIUM CARBONATE 500 MG/1
1000 TABLET, CHEWABLE ORAL DAILY PRN
Status: DISCONTINUED | OUTPATIENT
Start: 2024-11-13 | End: 2024-11-13 | Stop reason: HOSPADM

## 2024-11-13 RX ORDER — MIDAZOLAM HYDROCHLORIDE 2 MG/2ML
INJECTION, SOLUTION INTRAMUSCULAR; INTRAVENOUS
Status: COMPLETED | OUTPATIENT
Start: 2024-11-13 | End: 2024-11-13

## 2024-11-13 RX ORDER — CHLORHEXIDINE GLUCONATE ORAL RINSE 1.2 MG/ML
15 SOLUTION DENTAL ONCE
Status: COMPLETED | OUTPATIENT
Start: 2024-11-13 | End: 2024-11-13

## 2024-11-13 RX ORDER — ACETAMINOPHEN 325 MG/1
975 TABLET ORAL ONCE
Status: COMPLETED | OUTPATIENT
Start: 2024-11-13 | End: 2024-11-13

## 2024-11-13 RX ORDER — TRANEXAMIC ACID 10 MG/ML
1000 INJECTION, SOLUTION INTRAVENOUS ONCE
Status: COMPLETED | OUTPATIENT
Start: 2024-11-13 | End: 2024-11-13

## 2024-11-13 RX ORDER — KETOROLAC TROMETHAMINE 30 MG/ML
INJECTION, SOLUTION INTRAMUSCULAR; INTRAVENOUS AS NEEDED
Status: DISCONTINUED | OUTPATIENT
Start: 2024-11-13 | End: 2024-11-13 | Stop reason: HOSPADM

## 2024-11-13 RX ORDER — CEFAZOLIN SODIUM 1 G/50ML
1000 SOLUTION INTRAVENOUS EVERY 8 HOURS
Status: DISCONTINUED | OUTPATIENT
Start: 2024-11-13 | End: 2024-11-13 | Stop reason: HOSPADM

## 2024-11-13 RX ORDER — DOCUSATE SODIUM 100 MG/1
100 CAPSULE, LIQUID FILLED ORAL 2 TIMES DAILY
Status: DISCONTINUED | OUTPATIENT
Start: 2024-11-13 | End: 2024-11-13 | Stop reason: HOSPADM

## 2024-11-13 RX ORDER — AMLODIPINE BESYLATE 5 MG/1
10 TABLET ORAL DAILY
Status: DISCONTINUED | OUTPATIENT
Start: 2024-11-14 | End: 2024-11-13 | Stop reason: HOSPADM

## 2024-11-13 RX ORDER — SODIUM CHLORIDE, SODIUM LACTATE, POTASSIUM CHLORIDE, CALCIUM CHLORIDE 600; 310; 30; 20 MG/100ML; MG/100ML; MG/100ML; MG/100ML
125 INJECTION, SOLUTION INTRAVENOUS CONTINUOUS
Status: DISCONTINUED | OUTPATIENT
Start: 2024-11-13 | End: 2024-11-13 | Stop reason: HOSPADM

## 2024-11-13 RX ORDER — GLYCOPYRROLATE 0.2 MG/ML
INJECTION INTRAMUSCULAR; INTRAVENOUS AS NEEDED
Status: DISCONTINUED | OUTPATIENT
Start: 2024-11-13 | End: 2024-11-13

## 2024-11-13 RX ORDER — ONDANSETRON 2 MG/ML
4 INJECTION INTRAMUSCULAR; INTRAVENOUS EVERY 6 HOURS PRN
Status: DISCONTINUED | OUTPATIENT
Start: 2024-11-13 | End: 2024-11-13 | Stop reason: HOSPADM

## 2024-11-13 RX ORDER — ONDANSETRON 2 MG/ML
4 INJECTION INTRAMUSCULAR; INTRAVENOUS ONCE AS NEEDED
Status: DISCONTINUED | OUTPATIENT
Start: 2024-11-13 | End: 2024-11-13 | Stop reason: HOSPADM

## 2024-11-13 RX ORDER — DIPHENHYDRAMINE HYDROCHLORIDE 50 MG/ML
12.5 INJECTION INTRAMUSCULAR; INTRAVENOUS ONCE AS NEEDED
Status: DISCONTINUED | OUTPATIENT
Start: 2024-11-13 | End: 2024-11-13 | Stop reason: HOSPADM

## 2024-11-13 RX ORDER — SENNOSIDES 8.6 MG
1 TABLET ORAL DAILY
Status: DISCONTINUED | OUTPATIENT
Start: 2024-11-13 | End: 2024-11-13 | Stop reason: HOSPADM

## 2024-11-13 RX ORDER — CHLORHEXIDINE GLUCONATE 40 MG/ML
SOLUTION TOPICAL DAILY PRN
Status: DISCONTINUED | OUTPATIENT
Start: 2024-11-13 | End: 2024-11-13 | Stop reason: HOSPADM

## 2024-11-13 RX ORDER — CELECOXIB 200 MG/1
200 CAPSULE ORAL 2 TIMES DAILY
Qty: 60 CAPSULE | Refills: 0 | Status: SHIPPED | OUTPATIENT
Start: 2024-11-13 | End: 2024-12-13

## 2024-11-13 RX ORDER — METHOCARBAMOL 500 MG/1
500 TABLET, FILM COATED ORAL 2 TIMES DAILY
Status: DISCONTINUED | OUTPATIENT
Start: 2024-11-13 | End: 2024-11-13 | Stop reason: HOSPADM

## 2024-11-13 RX ORDER — ONDANSETRON 2 MG/ML
INJECTION INTRAMUSCULAR; INTRAVENOUS AS NEEDED
Status: DISCONTINUED | OUTPATIENT
Start: 2024-11-13 | End: 2024-11-13

## 2024-11-13 RX ORDER — KETOTIFEN FUMARATE 0.35 MG/ML
1 SOLUTION/ DROPS OPHTHALMIC 2 TIMES DAILY
Status: DISCONTINUED | OUTPATIENT
Start: 2024-11-14 | End: 2024-11-13 | Stop reason: HOSPADM

## 2024-11-13 RX ORDER — METOCLOPRAMIDE HYDROCHLORIDE 5 MG/ML
10 INJECTION INTRAMUSCULAR; INTRAVENOUS ONCE AS NEEDED
Status: DISCONTINUED | OUTPATIENT
Start: 2024-11-13 | End: 2024-11-13 | Stop reason: HOSPADM

## 2024-11-13 RX ORDER — OXYCODONE HYDROCHLORIDE 10 MG/1
10 TABLET ORAL EVERY 4 HOURS PRN
Status: DISCONTINUED | OUTPATIENT
Start: 2024-11-13 | End: 2024-11-13 | Stop reason: HOSPADM

## 2024-11-13 RX ORDER — DEXAMETHASONE SODIUM PHOSPHATE 10 MG/ML
INJECTION, SOLUTION INTRAMUSCULAR; INTRAVENOUS AS NEEDED
Status: DISCONTINUED | OUTPATIENT
Start: 2024-11-13 | End: 2024-11-13

## 2024-11-13 RX ORDER — MAGNESIUM HYDROXIDE/ALUMINUM HYDROXICE/SIMETHICONE 120; 1200; 1200 MG/30ML; MG/30ML; MG/30ML
30 SUSPENSION ORAL EVERY 6 HOURS PRN
Status: DISCONTINUED | OUTPATIENT
Start: 2024-11-13 | End: 2024-11-13 | Stop reason: HOSPADM

## 2024-11-13 RX ORDER — ASPIRIN 81 MG/1
81 TABLET, CHEWABLE ORAL 2 TIMES DAILY
Status: DISCONTINUED | OUTPATIENT
Start: 2024-11-14 | End: 2024-11-13 | Stop reason: HOSPADM

## 2024-11-13 RX ORDER — ACETAMINOPHEN 500 MG
1000 TABLET ORAL EVERY 8 HOURS PRN
Qty: 84 TABLET | Refills: 0 | Status: SHIPPED | OUTPATIENT
Start: 2024-11-13 | End: 2024-11-27

## 2024-11-13 RX ORDER — GABAPENTIN 300 MG/1
300 CAPSULE ORAL
Status: DISCONTINUED | OUTPATIENT
Start: 2024-11-13 | End: 2024-11-13 | Stop reason: HOSPADM

## 2024-11-13 RX ORDER — SODIUM CHLORIDE, SODIUM LACTATE, POTASSIUM CHLORIDE, CALCIUM CHLORIDE 600; 310; 30; 20 MG/100ML; MG/100ML; MG/100ML; MG/100ML
1.5 INJECTION, SOLUTION INTRAVENOUS CONTINUOUS
Status: DISCONTINUED | OUTPATIENT
Start: 2024-11-13 | End: 2024-11-13 | Stop reason: HOSPADM

## 2024-11-13 RX ORDER — HYDROMORPHONE HCL/PF 1 MG/ML
0.5 SYRINGE (ML) INJECTION
Status: DISCONTINUED | OUTPATIENT
Start: 2024-11-13 | End: 2024-11-13 | Stop reason: HOSPADM

## 2024-11-13 RX ORDER — ACETAMINOPHEN 325 MG/1
650 TABLET ORAL EVERY 6 HOURS PRN
Status: DISCONTINUED | OUTPATIENT
Start: 2024-11-13 | End: 2024-11-13 | Stop reason: HOSPADM

## 2024-11-13 RX ORDER — LIDOCAINE HYDROCHLORIDE 10 MG/ML
0.5 INJECTION, SOLUTION EPIDURAL; INFILTRATION; INTRACAUDAL; PERINEURAL ONCE AS NEEDED
Status: DISCONTINUED | OUTPATIENT
Start: 2024-11-13 | End: 2024-11-13 | Stop reason: HOSPADM

## 2024-11-13 RX ORDER — ATORVASTATIN CALCIUM 20 MG/1
40 TABLET, FILM COATED ORAL
Status: DISCONTINUED | OUTPATIENT
Start: 2024-11-13 | End: 2024-11-13 | Stop reason: HOSPADM

## 2024-11-13 RX ORDER — BACLOFEN 10 MG/1
10 TABLET ORAL 3 TIMES DAILY PRN
Status: DISCONTINUED | OUTPATIENT
Start: 2024-11-13 | End: 2024-11-13 | Stop reason: HOSPADM

## 2024-11-13 RX ADMIN — SODIUM CHLORIDE, SODIUM LACTATE, POTASSIUM CHLORIDE, AND CALCIUM CHLORIDE: .6; .31; .03; .02 INJECTION, SOLUTION INTRAVENOUS at 12:54

## 2024-11-13 RX ADMIN — FENTANYL CITRATE 50 MCG: 50 INJECTION INTRAMUSCULAR; INTRAVENOUS at 13:33

## 2024-11-13 RX ADMIN — CHLORHEXIDINE GLUCONATE 15 ML: 1.2 RINSE ORAL at 10:39

## 2024-11-13 RX ADMIN — ACETAMINOPHEN 325MG 975 MG: 325 TABLET ORAL at 10:39

## 2024-11-13 RX ADMIN — ONDANSETRON 4 MG: 2 INJECTION INTRAMUSCULAR; INTRAVENOUS at 11:39

## 2024-11-13 RX ADMIN — Medication 100 MCG: at 12:19

## 2024-11-13 RX ADMIN — Medication 100 MCG: at 12:40

## 2024-11-13 RX ADMIN — OXYCODONE 5 MG: 5 TABLET ORAL at 14:54

## 2024-11-13 RX ADMIN — DEXAMETHASONE SODIUM PHOSPHATE 10 MG: 10 INJECTION INTRAMUSCULAR; INTRAVENOUS at 11:39

## 2024-11-13 RX ADMIN — MEPIVACAINE HYDROCHLORIDE 3.5 ML: 15 INJECTION, SOLUTION EPIDURAL; INFILTRATION at 11:32

## 2024-11-13 RX ADMIN — BUPIVACAINE HYDROCHLORIDE 10 ML: 5 INJECTION, SOLUTION EPIDURAL; INTRACAUDAL; PERINEURAL at 11:19

## 2024-11-13 RX ADMIN — Medication 100 MCG: at 12:24

## 2024-11-13 RX ADMIN — TRANEXAMIC ACID 1000 MG: 10 INJECTION, SOLUTION INTRAVENOUS at 11:41

## 2024-11-13 RX ADMIN — CEFAZOLIN SODIUM 2000 MG: 2 SOLUTION INTRAVENOUS at 11:33

## 2024-11-13 RX ADMIN — Medication 100 MCG: at 12:28

## 2024-11-13 RX ADMIN — SODIUM CHLORIDE, SODIUM LACTATE, POTASSIUM CHLORIDE, AND CALCIUM CHLORIDE: .6; .31; .03; .02 INJECTION, SOLUTION INTRAVENOUS at 10:42

## 2024-11-13 RX ADMIN — BUPIVACAINE 20 ML: 13.3 INJECTION, SUSPENSION, LIPOSOMAL INFILTRATION at 11:19

## 2024-11-13 RX ADMIN — MIDAZOLAM 2 MG: 1 INJECTION INTRAMUSCULAR; INTRAVENOUS at 11:18

## 2024-11-13 RX ADMIN — PROPOFOL 50 MG: 10 INJECTION, EMULSION INTRAVENOUS at 11:33

## 2024-11-13 RX ADMIN — Medication 100 MCG: at 12:46

## 2024-11-13 RX ADMIN — PROPOFOL 80 MCG/KG/MIN: 10 INJECTION, EMULSION INTRAVENOUS at 11:33

## 2024-11-13 RX ADMIN — GLYCOPYRROLATE 0.1 MG: 0.2 INJECTION INTRAMUSCULAR; INTRAVENOUS at 11:43

## 2024-11-13 NOTE — PHYSICAL THERAPY NOTE
PT EVALUATION and TREATMENT    Pt. Name: Shraddha Frazier  Pt. Age: 74 y.o.  MRN: [id number]  LENGTH OF STAY: 0    Patient Active Problem List   Diagnosis    Essential hypertension    Hyperlipidemia    Overweight (BMI 25.0-29.9)    Varicose veins of both lower extremities    Swelling of both lower extremities    Primary osteoarthritis of right knee    Right hip pain    Shoulder pain    Age-related osteoporosis without current pathological fracture    Trochanteric bursitis of right hip    Prediabetes    Chronic pain of left knee    Osteoarthritis of right knee    Internal derangement of right knee       Admitting Diagnoses:   Primary osteoarthritis of right knee [M17.11]    Past Medical History:   Diagnosis Date    Chronic cough     Decreased hearing, left 05/02/2019    Echocardiogram abnormal 02/01/2016    Mild LVH with normal systolic function EF > 70%. Grade I Diastolic dysfunction. Aortic sclerosis w/o significant stenosis. Mild insufficiency. Mild MR. Mild TR with normal pulmonary artery pressure.     History of mammogram 09/13/2017    Benign    History of mammogram 02/15/2016    Benign    HTN (hypertension)     Hyperlipidemia     Hypertension     Osteopenia determined by x-ray 02/03/2016    osteopenia of the left femoral neck and normal bone marrow density of the left hip total. Osteopenia of the lumbar spine.     Primary osteoarthritis of left knee 06/08/2023    Primary osteoarthritis of right knee 08/07/2020       Past Surgical History:   Procedure Laterality Date    BLADDER SUSPENSION      HYSTERECTOMY      age 45       Imaging Studies:  XR knee right 1 or 2 views    (Results Pending)         11/13/24 1545   PT Last Visit   PT Visit Date 11/13/24   Note Type   Note type Evaluation and Treatment   Additional Comments pt greeted in supine   Pain Assessment   Pain Assessment Tool 0-10   Pain Score 4   Pain Location/Orientation Orientation: Right;Location: Knee   Hospital Pain Intervention(s)  Repositioned;Ambulation/increased activity;Elevated;Emotional support;Rest   Restrictions/Precautions   Weight Bearing Precautions Per Order Yes   RLE Weight Bearing Per Order WBAT   Other Precautions Pain;Fall Risk;Multiple lines   Home Living   Type of Home Apartment   Home Layout One level;Performs ADLs on one level;Able to live on main level with bedroom/bathroom;Stairs to enter with rails  (12 SUDHAKAR with rail on R)   Bathroom Shower/Tub Tub/shower unit   Bathroom Toilet Standard  (1 stand. 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)   General   Family/Caregiver Present Yes   Cognition   Overall Cognitive Status WFL   Arousal/Participation Alert   Orientation Level Oriented X4   Following Commands Follows one step commands without difficulty   Comments pt primarily British speaking   RUE Assessment   RUE Assessment   (see OT note)   LUE Assessment   LUE Assessment   (see OT note)   RLE Assessment   RLE Assessment X  (did not assess due to post op, able to flex hip and move ankle through normal ROM)   LLE Assessment   LLE Assessment WFL   Light Touch   RLE Light Touch Grossly intact   LLE Light Touch Grossly intact   Bed Mobility   Supine to Sit 4  Minimal assistance   Additional items Assist x 1;Increased time required;Verbal cues;LE management   Sit to Supine Unable to assess   Additional Comments pt greeted in supine. VITALS /63 supine   Transfers   Sit to Stand 4  Minimal assistance    Additional items Assist x 1;Increased time required;Verbal cues  (improved to S as session went on. RW)   Stand to Sit 4  Minimal assistance   Additional items Assist x 1;Increased time required;Verbal cues  (improved to S as session went on. RW)   Additional Comments cues for RW safety and hand placement   Ambulation/Elevation   Gait pattern Improper Weight shift;R Knee Laurie;Antalgic;Decreased R stance;Short stride;Step to;Excessively slow;Decreased hip extension;Decreased heel strike;Decreased toe off   Gait Assistance 4  Minimal assist  (improved to S as seession went on)   Additional items Assist x 1;Verbal cues   Assistive Device Rolling walker   Distance 70'x1   Stair Management Assistance 4  Minimal assist   Additional items Assist x 1;Increased time required;Verbal cues  (improved to S as we trailed steps. (treatment))   Stair Management Technique Two rails;Step to pattern;Foreward   Number of Stairs 16   Ambulation/Elevation Additional Comments cues for LE seqeuncing during stair training   Balance   Static Sitting Good   Dynamic Sitting Fair +   Static Standing Fair   Dynamic Standing Fair -   Ambulatory Fair -   Endurance Deficit   Endurance Deficit Yes   Endurance Deficit Description fatigue   Activity Tolerance   Activity Tolerance Patient tolerated treatment well   Medical Staff Made Aware RN Yaquelin/Angie, LAURA Mathur   Nurse Made Aware yes   Assessment   Prognosis Good   Problem List Decreased strength;Decreased range of motion;Decreased endurance;Impaired balance;Decreased mobility;Orthopedic restrictions;Pain   Assessment Pt is a 74 y.o. female who presented to [facility]2024 s/p R Unicompartmental Arthroplasty done by Dr. Cardenas. Precautions include WBAT. Pt  has a past medical history of History of mammogram (02/15/2016), HTN, Hyperlipidemia, Hypertension. Pt greeted at bedside for PT evaluation on 11/13/24. Pt referred to PT for functional mobility evaluation & D/C planning w/ orders  of activity beginning POD #0 and activity as tolerated. PTA, pt reports being  I with RW prn and I with IADLs . Personal factors affecting pt at time of IE include: ambulating w/ assistive device and stairs to enter home. Please find objective findings from PT assessment regarding body systems outlined above with impairments and limitations including weakness, decreased ROM, impaired balance, decreased endurance, gait deviations, pain, decreased activity tolerance, decreased functional mobility tolerance, fall risk, and orthopedic restrictions.  Please see flow sheet above for objective findings and level of assistance required for safe completion of functional tasks. Pt was able to perform supine to sit with MinAx1 for LE management. Pt able to perform sit<>stand with minAx1 but progressed to S as session went on. Pt had inconsistent buckling at start of session in R LE, but improved as session went on. Pt able to ambulate 70'x1 with minAx1 and progressed to S. Pt needed cues for RW safety and LE sequencing during gait. Pt is primarily Turkmen speaking, used an  to communicate with patient and daughter. Please see treatment assessment for continued functional mobility. Pt demonstrated dec endurance and tolerance to activity. Denies reports of dizziness or SOB t/o session. Patient was left  in staxi  with call bell and all needs within reach. Pt was educated on fall precautions and reinforced w/ good understanding. Based on pt presentation and impaired function, pt would benefit from level III, (minimal resource intensity) at D/C. From PT/mobility standpoint, pt would benefit from OPPT to address deficits as defined above and maximize level of independence and return pt to PLOF. HEP given and reviewed with patient, no questions at this time. CM to follow. Nsg staff to continue to mobilized pt (OOB in chair for all meals & ambulate in room/unit) as tolerated to prevent further decline in function. Nsg  notified. Co-eval performed with OT to complete this evaluation for the pts best interest given pts medical complexity and functional level.   Barriers to Discharge Inaccessible home environment  (Steps)   Goals   Patient Goals to go home   STG Expiration Date 11/20/24   Short Term Goal #1 1).  Pt will perform bed mobility with Tana demonstrating appropriate technique 100% of the time in order to improve function.2)  Perform all transfers with Tana demonstrating safe and appropriate technique 100% of the time in order to improve ability to negotiate safely in home environment.3) Amb with least restrictive AD > 200'x1 with Tana in order to demonstrate ability to negotiate in home environment.4)  Improve overall strength and balance 1/2 grade in order to optimize ability to perform functional tasks and reduce fall risk.5) Increase activity tolerance to 45 minutes in order to improve endurance to functional tasks.6)  Negotiate stairs using most appropriate technique and Tana in order to be able to negotiate safely in home environment. 7) PT for ongoing patient and family/caregiver education, DME needs and d/c planning in order to promote highest level of function in least restrictive environment.   Plan   Treatment/Interventions Functional transfer training;LE strengthening/ROM;Elevations;Therapeutic exercise;Endurance training;Patient/family training;Bed mobility;Gait training;Spoke to nursing;OT;Family   PT Frequency Twice a day  (prn)   Discharge Recommendation   Rehab Resource Intensity Level, PT III (Minimum Resource Intensity)   Equipment Recommended Walker  (prn)   Additional Comments The patient's AM-PAC Basic Mobility Inpatient Short Form Raw Score is 18. A Raw score of greater than 16 suggests the patient may benefit from discharge to home. Please also refer to the recommendation of the Physical Therapist for safe discharge planning.   AM-PAC Basic Mobility Inpatient   Turning in Flat Bed Without Bedrails 3    Lying on Back to Sitting on Edge of Flat Bed Without Bedrails 3   Moving Bed to Chair 3   Standing Up From Chair Using Arms 3   Walk in Room 3   Climb 3-5 Stairs With Railing 3   Basic Mobility Inpatient Raw Score 18   Basic Mobility Standardized Score 41.05   [facility] Highest Level Of Mobility   -HLM Goal 6: Walk 10 steps or more   -HLM Achieved 7: Walk 25 feet or more   Additional Treatment Session   Start Time 1600   End Time 1625   Treatment Assessment Pt was seen for additional functional mobility following evaluation. Pt was educated on proper technique on the steps with rails. Pt able to perform 16 steps with minAx1 for 8 steps and S for 8 steps. Pt improved technique as we trialed stairs. No LOB, but weakness noted in R LE. Pt and daughter comfortable with stair training following. Reviewed proper way to get in and out of the car, no questions following. Pt was left in staxi with OT and daughter in room. RN updated   Equipment Use steps, RW   Additional Treatment Day 1   End of Consult   Patient Position at End of Consult Bedside chair;All needs within reach   End of Consult Comments pt stable, left in staxi with OT and daughter in room. RN updated     Hx/personal factors: co-morbidities, inaccessible home, advanced age, use of AD, pain, fall risk, and Martiniquais speaking  Examination: dec mobility, dec balance, dec endurance, dec amb, risk for falls, pain, assessed body system, balance, endurance, amb, D/C disposition & fall risk, WB restrictions, impairements in locomotion, musculoskeletal, balance, endurance, posture, coordination  Clinical: unpredictable (ongoing medical status, risk for falls, POD #0, and pain mgt)  Complexity: high  Cira Felder, PT

## 2024-11-13 NOTE — OCCUPATIONAL THERAPY NOTE
Occupational Therapy Evaluation and Treatment     Patient Name: Shraddha Frazier  Today's Date: 11/13/2024  Problem List  Principal Problem:    Primary osteoarthritis of right knee    Past Medical History  Past Medical History:   Diagnosis Date    Chronic cough     Decreased hearing, left 05/02/2019    Echocardiogram abnormal 02/01/2016    Mild LVH with normal systolic function EF > 70%. Grade I Diastolic dysfunction. Aortic sclerosis w/o significant stenosis. Mild insufficiency. Mild MR. Mild TR with normal pulmonary artery pressure.     History of mammogram 09/13/2017    Benign    History of mammogram 02/15/2016    Benign    HTN (hypertension)     Hyperlipidemia     Hypertension     Osteopenia determined by x-ray 02/03/2016    osteopenia of the left femoral neck and normal bone marrow density of the left hip total. Osteopenia of the lumbar spine.     Primary osteoarthritis of left knee 06/08/2023    Primary osteoarthritis of right knee 08/07/2020     Past Surgical History  Past Surgical History:   Procedure Laterality Date    BLADDER SUSPENSION      HYSTERECTOMY      age 45           11/13/24 1537   OT Last Visit   OT Visit Date 11/13/24   Note Type   Note type Evaluation   Additional Comments pt greeted in supine, agreeable to OT evaluation. [employer]  use during session.   Pain Assessment   Pain Assessment Tool 0-10   Pain Score 4   Pain Location/Orientation Orientation: Right;Location: Knee   Hospital Pain Intervention(s) Repositioned;Ambulation/increased activity;Elevated;Emotional support;Rest   Restrictions/Precautions   Weight Bearing Precautions Per Order Yes   RLE Weight Bearing Per Order WBAT   Other Precautions WBS;Fall Risk;Pain;Multiple lines   Home Living   Type of Home Apartment   Home Layout One level;Performs ADLs on one level;Able to live on main level with bedroom/bathroom;Stairs to enter with rails  (12 SUDHAKAR with R rail)   Bathroom Shower/Tub Tub/shower unit   Bathroom Toilet  Raised  (and 1 standard toilet)   Bathroom Equipment Other (Comment)  (Denies DME)   Bathroom Accessibility Accessible   Home Equipment Walker   Prior Function   Level of [place] Independent with ADLs;Independent with functional mobility;Needs assistance with IADLS   Lives With Daughter;Other (Comment)  (grandson)   Receives Help From Family   IADLs Family/Friend/Other provides transportation;Independent with meal prep;Independent with medication management   Falls in the last 6 months 0   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)   Comments use of RW prn   Lifestyle   [place] Independent with all ADLs/IADLs except driving, use of RW as needed   Reciprocal Relationships Daughter   Service to Others FTE   General   Family/Caregiver Present Yes   ADL   Where Assessed Edge of bed   Eating Assistance 5  Supervision/Setup   Grooming Assistance 5  Supervision/Setup   UB Bathing Assistance 5  Supervision/Setup   LB Bathing Assistance 4  Minimal Assistance   UB Dressing Assistance 5  Supervision/Setup   LB Dressing Assistance 4  Minimal Assistance   Toileting Assistance  5  Supervision/Setup   Bed Mobility   Supine to Sit 4  Minimal assistance   Additional items Assist x 1;Increased time required;Verbal cues   Sit to Supine Unable to assess   Additional Comments pt greeted in supine, ended session seated in staxi. BP supine: 136/63.   Transfers   Sit to Stand 4  Minimal assistance   Additional items Assist x 1;Increased time required;Verbal cues  (RW, progressing to supervision during session)   Stand to Sit 4  Minimal assistance   Additional items Assist x 1;Increased time required;Verbal cues  (RW, progressing to supervision during session.)   Toilet transfer 5  Supervision   Additional items Increased time required;Verbal cues;Standard toilet;Other  (use of grab bars and RW)   Additional Comments verbal cues for hand placement and safety with RW   Functional Mobility   Functional Mobility 4  Minimal  assistance   Additional Comments minAx1 with use of RW progressing to supervision functional household distances   Additional items Rolling walker   Balance   Static Sitting Good   Dynamic Sitting Fair +   Static Standing Fair   Dynamic Standing Fair -   Ambulatory Fair -   Activity Tolerance   Activity Tolerance Patient tolerated treatment well   Medical Staff Made Aware PT Ali, Pt seen for co-evaluation/treatment with skilled Physical Therapy due to pt's medical complexity, decreased endurance, overall functional level, overall safety, and post surgical day #0.   Nurse Made Aware JOSEFA Jamison/Angie APPLE Assessment   RUE Assessment WFL   LUE Assessment   LUE Assessment WFL   Hand Function   Gross Motor Coordination Functional   Fine Motor Coordination Functional   Cognition   Overall Cognitive Status WFL   Arousal/Participation Alert;Cooperative   Attention Within functional limits   Orientation Level Oriented X4   Memory Within functional limits   Following Commands Follows all commands and directions without difficulty   Comments Cooperative, primarily Indian speaking use of [employer] during session to translate   Assessment   Limitation Decreased ADL status;Decreased endurance;Decreased self-care trans;Decreased high-level ADLs   Prognosis Good   Assessment Pt is a 74 y.o. female seen for OT evaluation s/p adm [facility] on [facility]/13/2024 w/ primary osteoarthritis of right knee . Comorbidities affecting pt’s functional performance include a significant PMH of chronic cough, HTN, hyperlipidemia, osteopenia, osteoarthritis. Pt with active OT orders and activity orders for Activity beginning POD #0. WBAT RLE. Pt lives with daughter and grandson in a 2nd floor apartment. Pt has 8 SUDHAKAR and 8 steps to 2nd floor. Pt has tub/shower. At baseline, pt was independent with all ADLs/IADLs. Pt completed supine to sit with Jayme for LE management. Pt completed sit to stand with minAx1 with use of RW. Pt completed functional  mobility from room to bathroom with minAx1 progressing to S. Pt completed toileting on standard toilet with use of grab bars and RW. See additional treatment session focusing on ADLs/IADLs, transfers, and LE management for independence at home. Upon evaluation, pt currently requires S for UB ADLs, Jayme for LB ADLs, S for toileting, Jayme for bed mobility, Jayme-S for functional mobility, and Jayme-S for transfers 2* the following deficits impacting occupational performance: weakness, decreased strength , decreased balance, decreased activity tolerance, increased pain, and orthopedic restrictions. These impairments, as well at pt’s personal factors of: SUDHAKAR home environment, difficulty performing ADLs, difficulty performing IADLs, difficulty performing transfers/mobility, WBS, fall risk , new use of AD for functional transfers/mobility, and language barrier limit pt’s ability to safely engage in all baseline areas of occupation. Based on the aforementioned OT evaluation, functional performance deficits, and assessments, pt has been identified as a high complexity evaluation. Pt to continue to benefit from continued acute OT services during hospital stay to address defined deficits and to maximize level of functional independence in the following Occupational Performance areas: grooming, bathing/shower, toilet hygiene, dressing, health maintenance, functional mobility, community mobility, clothing management, cleaning, meal prep, household maintenance, and job performance/volunteering. From OT standpoint, recommend No post-acute rehabilitation needs upon D/C. OT will continue to follow pt 3-5x/wk.   Goals   Patient Goals to go home   STG Time Frame 1-3   Short Term Goal #1 Pt will improve activity tolerance to G for min 30 min treatment sessions for increase engagement in functional tasks   Short Term Goal #2 Pt will complete bed mobility at a mod I level w/ G balance/safety demonstrated to decrease caregiver assistance  required   Short Term Goal  Pt will complete LB dressing/self care w/ mod I using adaptive device and DME as needed   LTG Time Frame 3-7   Long Term Goal #1 Pt will complete toileting w/ mod I w/ G hygiene/thoroughness using DME as needed   Long Term Goal #2 Pt will improve functional transfers to mod I on/off all surfaces using DME as needed w/ G balance/safety   Long Term Goal Pt will improve functional mobility during ADL/IADL/leisure tasks to mod I using DME as needed w/ G balance/safety   Plan   Treatment Interventions ADL retraining;Functional transfer training;Endurance training;Patient/family training;Equipment evaluation/education;Compensatory technique education;Continued evaluation;Energy conservation;Activityengagement   Goal Expiration Date 11/20/24   OT Treatment Day 0   OT Frequency 3-5x/wk   Discharge Recommendation   Rehab Resource Intensity Level, OT No post-acute rehabilitation needs   Additional Comments  The patient's raw score on the AM-PAC Daily Activity Inpatient Short Form is 19 . A raw score of greater than or equal to 19 suggests the patient may benefit from discharge to home. Please refer to the recommendation of the Occupational Therapist for safe discharge planning.   AM-PAC Daily Activity Inpatient   Lower Body Dressing 2   Bathing 3   Toileting 3   Upper Body Dressing 4   Grooming 3   Eating 4   Daily Activity Raw Score 19   Daily Activity Standardized Score (Calc for Raw Score >=11) 40.22   AM-PAC Applied Cognition Inpatient   Following a Speech/Presentation 4   Understanding Ordinary Conversation 4   Taking Medications 4   Remembering Where Things Are Placed or Put Away 4   Remembering List of 4-5 Errands 4   Taking Care of Complicated Tasks 4   Applied Cognition Raw Score 24   Applied Cognition Standardized Score 62.21   Additional Treatment Session   Start Time 1615   End Time 1630   Treatment Assessment Pt seen for OT treatment session focusing on ADLs/IADLs, functional mobility,  functional standing tolerance, functional transfers, patient education, continued evaluation. Pt alert and cooperative throughout session. Pt with good sitting balance and fair - dynamic standing balance. Pt tolerated treatment well. Pt completed toileting on standard toilet with supervision. Pt completed sit to stand from toilet with supervision and use of grab bars and RW. Pt completed functional mobility back to room with supervision and use of RW. Pt completed don of underwear with Jayme from daughter, then standing with use of RW to pull over waist. Don of bra and dressing with supervision in standing use of RW. Pt educated on ADLs/IADLs, LE management, transfers, car transfer for independence at home. Pt reports 4/10 pain and no dizziness. Pt's vitals include: stable.     Pt ended session seated in staxi. Call bell and phone within reach. All needs met and pt reports no further questions at this time. Continue to recommend No post-acute rehabilitation needs when medically cleared. OT will continue to follow pt on caseload.   Additional Treatment Day 1   End of Consult   Education Provided Yes;Family or social support of family present for education by provider   Patient Position at End of Consult All needs within reach;Other (comment)  (seated in staxi)   Nurse Communication Nurse aware of consult   End of Consult Comments Pt  seated in staxi  at end of session. Call bell and phone within reach. All needs met and pt reports no further questions for OT at this time.   Kareen Rosario, OT

## 2024-11-13 NOTE — DISCHARGE INSTR - AVS FIRST PAGE
Danny Cardenas MD  Adult Reconstruction Specialist   [facility]  Office Phone: [phone]    Discharge Instructions - Knee Replacement    What to Expect/Activity  You are weight bearing as tolerated to your operative leg with assistive devices.  Please use crutches/walker when ambulating as needed until your follow-up  Significant swelling and discomfort in the knee is normal for several days to weeks after surgery. You may use ice around the knee to help as desired. This can be used for 20-30 minutes every 1-2 hours  To optimally control swelling, your leg should be elevated above heart level as often as necessary. This can greatly improve post operative pain levels, due to uncontrolled swelling.   For the first several weeks after surgery, it is normal to experience redness, swelling, brusing and pain to the operative knee. This is generally not a sign of concern or an abnormal finding.    Post-operative Objectives  In effort to restore your knee range of motion, it is essential to immediately begin flexing your knee. Do so several times a day with the goal of achieving at or around 90 degrees by the time we see you 2 weeks post operatively.   There are no restrictions as to how often you can perform this knee motion. Your knee implant and incision are able to withstand these types of movement  You may utilize the physical therapy exercise packet provided to you by the physical therapy team. This should have been given to you during their initial assessment in the hospital.  If we feel it is appropriate, we will initiate formal physical therapy 2 weeks after surgery. Once we see you at the first post operative visit, we will decide if this is the best course of action and of benefit to you.   If you have any questions regarding the above objectives, please do not hesistate to call our office for clarification.        Dressing/Wound Care/Bathing  After surgery, your knee will be  wrapped in an ace wrap, toe to mid thigh, with a gray waterproof adhesive dressing underneath protecting the incision  You may remove your ace wrap 5 days after surgery and may be re-applied and/or tightened/loosened as necessary. I recommend to re-wrap your leg if swelling is an issue.   You may start showering 5 days after surgery. When drying off, please pat the dressing dry. If you notice the dressing appears saturated or is starting to come off, please over-wrap with dry dressing.  If you shower too early, there is a higher chance of infection and wound healing complications.  No baths, swimming or submerging until cleared by Dr. Cardenas    Pain Management/Medications  You may resume your usual medications.  Please take the following medications:  Anti-coagulation (blood clot prevention) - Aspirin 81 mg, 1 tablet twice daily for 6 weeks - if you have any allergic reactions, please contact our office and we will send you in an alternative blood clot medications  Antibiotics - none  Pain medication:  Narcotic Medication: Oxycodone 5 mg, 1 tablet every 4-6 hours as needed for severe pain  NSAID (Anti-inflammatory): Celebrex 200 mg, 1 tablet twice a day as needed for mild to moderate pain  Tylenol 1000mg up to three times daily as needed for mild to moderate pain. Do not exceed 3000mg daily   If you have questions or pain concerns, please contact the office. Pain medication cannot remove all post-operative pain    Follow up/Call if:  The findings of your surgery will be explained to you and your family immediately after surgery. However, in the post-operative period, during recovery from anesthesia you may not fully remember or fully understand what was said. This will be again gone over when you return for your post-op appointment.  Please contact Dr. Cardenas's office if you experience the following:  Excessive bleeding (bleeding through your dressing)  Fever greater than 101 degrees F after 48 hours (low  grade fevers the day or two after surgery are normal)  Persistent nausea or vomiting  Decreased sensation or discoloration of the operative limb  Pain or swelling that is getting worse and not better with medication      Dr. Cardenas's Office Contact: [phone]

## 2024-11-13 NOTE — PLAN OF CARE
Problem: OCCUPATIONAL THERAPY ADULT  Goal: Performs self-care activities at highest level of function for planned discharge setting.  See evaluation for individualized goals.  Description: Treatment Interventions: ADL retraining, Functional transfer training, Endurance training, Patient/family training, Equipment evaluation/education, Compensatory technique education, Continued evaluation, Energy conservation, Activityengagement          See flowsheet documentation for full assessment, interventions and recommendations.   Note: Limitation: Decreased ADL status, Decreased endurance, Decreased self-care trans, Decreased high-level ADLs  Prognosis: Good  Assessment: Pt is a 74 y.o. female seen for OT evaluation s/p adm to [facility] on 11/13/2024 w/ primary osteoarthritis of right knee . Comorbidities affecting pt’s functional performance include a significant PMH of chronic cough, HTN, hyperlipidemia, osteopenia, osteoarthritis. Pt with active OT orders and activity orders for Activity beginning POD #0. WBAT RLE. Pt lives with daughter and grandson in a 2nd floor apartment. Pt has 8 SUDHAKAR and 8 steps to 2nd floor. Pt has tub/shower. At baseline, pt was independent with all ADLs/IADLs. Pt completed supine to sit with Jayme for LE management. Pt completed sit to stand with minAx1 with use of RW. Pt completed functional mobility from room to bathroom with minAx1 progressing to S. Pt completed toileting on standard toilet with use of grab bars and RW. See additional treatment session focusing on ADLs/IADLs, transfers, and LE management for independence at home. Upon evaluation, pt currently requires S for UB ADLs, Jayme for LB ADLs, S for toileting, Jayme for bed mobility, Jayme-S for functional mobility, and Jayme-S for transfers 2* the following deficits impacting occupational performance: weakness, decreased strength , decreased balance, decreased activity tolerance, increased pain, and orthopedic restrictions. These  impairments, as well at pt’s personal factors of: SUDHAKAR home environment, difficulty performing ADLs, difficulty performing IADLs, difficulty performing transfers/mobility, WBS, fall risk , new use of AD for functional transfers/mobility, and language barrier limit pt’s ability to safely engage in all baseline areas of occupation. Based on the aforementioned OT evaluation, functional performance deficits, and assessments, pt has been identified as a high complexity evaluation. Pt to continue to benefit from continued acute OT services during hospital stay to address defined deficits and to maximize level of functional independence in the following Occupational Performance areas: grooming, bathing/shower, toilet hygiene, dressing, health maintenance, functional mobility, community mobility, clothing management, cleaning, meal prep, household maintenance, and job performance/volunteering. From OT standpoint, recommend No post-acute rehabilitation needs upon D/C. OT will continue to follow pt 3-5x/wk.     Rehab Resource Intensity Level, OT: No post-acute rehabilitation needs

## 2024-11-13 NOTE — ANESTHESIA POSTPROCEDURE EVALUATION
Post-Op Assessment Note    CV Status:  Stable    Pain management: adequate       Mental Status:  Alert and awake   Hydration Status:  Euvolemic   PONV Controlled:  Controlled   Airway Patency:  Patent     Post Op Vitals Reviewed: Yes    No anethesia notable event occurred.    Staff: CRNA           Last Filed PACU Vitals:  Vitals Value Taken Time   Temp 97.4    Pulse 72    BP 99/59    Resp 18    SpO2 97        Modified Erica:  No data recorded

## 2024-11-13 NOTE — ANESTHESIA PREPROCEDURE EVALUATION
Procedure:  ARTHROPLASTY KNEE UNICOMPARTMENTAL VS TOTAL; SAME DAY (Right: Knee)    Relevant Problems   ANESTHESIA (within normal limits)      CARDIO   (+) Essential hypertension   (+) Hyperlipidemia   (+) Varicose veins of both lower extremities      ENDO (within normal limits)      GI/HEPATIC (within normal limits)      /RENAL (within normal limits)      GYN (within normal limits)      HEMATOLOGY (within normal limits)      MUSCULOSKELETAL   (+) Osteoarthritis of right knee      NEURO/PSYCH (within normal limits)      PULMONARY (within normal limits)      Rheumatology   (+) Internal derangement of right knee        Physical Exam    Airway    Mallampati score: II  TM Distance: >3 FB  Neck ROM: full     Dental   No notable dental hx     Cardiovascular  Rhythm: regular, Rate: normal, Cardiovascular exam normal    Pulmonary  Pulmonary exam normal Breath sounds clear to auscultation    Other Findings  post-pubertal.      Anesthesia Plan  ASA Score- 2     Anesthesia Type- spinal with ASA Monitors.         Additional Monitors:     Airway Plan:     Comment: Right adductor canal block.       Plan Factors-Exercise tolerance (METS): >4 METS.    Chart reviewed. EKG reviewed. Imaging results reviewed. Existing labs reviewed. Patient summary reviewed.                  Induction- intravenous.    Postoperative Plan- Plan for postoperative opioid use.     Perioperative Resuscitation Plan - Level 1 - Full Code.       Informed Consent- Anesthetic plan and risks discussed with patient.  I personally reviewed this patient with the CRNA. Discussed and agreed on the Anesthesia Plan with the CRNA..      Recent labs personally reviewed:  Lab Results   Component Value Date    WBC 5.85 10/26/2024    HGB 13.8 10/26/2024     10/26/2024     Lab Results   Component Value Date    K 3.9 10/26/2024    BUN 10 10/26/2024    CREATININE 0.61 10/26/2024     Lab Results   Component Value Date    PTT 27 10/26/2024      Lab Results   Component  Value Date    INR 0.99 10/26/2024       Blood type A    Lab Results   Component Value Date    HGBA1C 5.6 10/26/2024       I, Devon Momin MD, have personally seen and evaluated the patient prior to anesthetic care.  I have reviewed the pre-anesthetic record, and other medical records if appropriate to the anesthetic care.  If a CRNA is involved in the case, I have reviewed the CRNA assessment, if present, and agree. Risks/benefits and alternatives discussed with patient including possible PONV, sore throat, and possibility of rare anesthetic and surgical emergencies.

## 2024-11-13 NOTE — NURSING NOTE
When reviewing discharge instructions patient states she is allergic to ASA- states she gets stomach aches and whole body aches- reached out to tyler ISABEL of [facility] who instructed to have patient still try the ASA - patient is to start tomorrow morning and to call clinic if she does not handle medication well. Used  to communicate with this with patient #[id number]- patient verbalizes understanding.

## 2024-11-13 NOTE — PLAN OF CARE
Problem: PHYSICAL THERAPY ADULT  Goal: Performs mobility at highest level of function for planned discharge setting.  See evaluation for individualized goals.  Description: Treatment/Interventions: Functional transfer training, LE strengthening/ROM, Elevations, Therapeutic exercise, Endurance training, Patient/family training, Bed mobility, Gait training, Spoke to nursing, OT, Family  Equipment Recommended: Walker (prn)       See flowsheet documentation for full assessment, interventions and recommendations.  Note: Prognosis: Good  Problem List: Decreased strength, Decreased range of motion, Decreased endurance, Impaired balance, Decreased mobility, Orthopedic restrictions, Pain  Assessment: Pt is a 74 y.o. female who presented to [facility] on 11/13/2024 s/p R Unicompartmental Arthroplasty done by Dr. Cardenas. Precautions include WBAT. Pt  has a past medical history of History of mammogram (02/15/2016), HTN, Hyperlipidemia, Hypertension. Pt greeted at bedside for PT evaluation on 11/13/24. Pt referred to PT for functional mobility evaluation & D/C planning w/ orders of activity beginning POD #0 and activity as tolerated. PTA, pt reports being  I with RW prn and I with IADLs . Personal factors affecting pt at time of IE include: ambulating w/ assistive device and stairs to enter home. Please find objective findings from PT assessment regarding body systems outlined above with impairments and limitations including weakness, decreased ROM, impaired balance, decreased endurance, gait deviations, pain, decreased activity tolerance, decreased functional mobility tolerance, fall risk, and orthopedic restrictions.  Please see flow sheet above for objective findings and level of assistance required for safe completion of functional tasks. Pt was able to perform supine to sit with MinAx1 for LE management. Pt able to perform sit<>stand with minAx1 but progressed to S as session went on. Pt had inconsistent buckling at start  of session in R LE, but improved as session went on. Pt able to ambulate 70'x1 with minAx1 and progressed to S. Pt needed cues for RW safety and LE sequencing during gait. Pt is primarily Slovak speaking, used an  to communicate with patient and daughter. Please see treatment assessment for continued functional mobility. Pt demonstrated dec endurance and tolerance to activity. Denies reports of dizziness or SOB t/o session. Patient was left  in st[place]i  with call bell and all needs within reach. Pt was educated on fall precautions and reinforced w/ good understanding. Based on pt presentation and impaired function, pt would benefit from level III, (minimal resource intensity) at D/C. From PT/mobility standpoint, pt would benefit from OPPT to address deficits as defined above and maximize level of independence and return pt to PLOF. HEP given and reviewed with patient, no questions at this time. CM to follow. Nsg staff to continue to mobilized pt (OOB in chair for all meals & ambulate in room/unit) as tolerated to prevent further decline in function. Nsg notified. Co-eval performed with OT to complete this evaluation for the pts best interest given pts medical complexity and functional level.  Barriers to Discharge: Inaccessible home environment (Steps)     Rehab Resource Intensity Level, PT: III (Minimum Resource Intensity)    See flowsheet documentation for full assessment.

## 2024-11-13 NOTE — ANESTHESIA PROCEDURE NOTES
Spinal Block    Patient location during procedure: OR  Start time: 11/13/2024 11:32 AM  Staffing  Performed by: Danny Renteria CRNA  Authorized by: Devon Momin MD    Preanesthetic Checklist  Completed: patient identified, IV checked, site marked, risks and benefits discussed, surgical consent, monitors and equipment checked, pre-op evaluation and timeout performed  Spinal Block  Patient position: sitting  Prep: ChloraPrep  Patient monitoring: cardiac monitor, continuous pulse ox and frequent blood pressure checks  Approach: midline  Location: L3-4  Needle  Needle type: Pencan   Needle gauge: 24 G  Needle length: 3.5 in  Assessment  Injection Assessment:  negative aspiration for heme, no paresthesia on injection and positive aspiration for clear CSF.  Post-procedure:  site cleaned

## 2024-11-13 NOTE — ANESTHESIA PROCEDURE NOTES
Peripheral Block    Patient location during procedure: holding area  Start time: 11/13/2024 11:19 AM  Reason for block: at surgeon's request and post-op pain management  Staffing  Performed by: Devon Momin MD  Authorized by: Devon Momin MD    Preanesthetic Checklist  Completed: patient identified, IV checked, site marked, risks and benefits discussed, surgical consent, monitors and equipment checked, pre-op evaluation and timeout performed  Peripheral Block  Patient position: supine  Prep: ChloraPrep  Patient monitoring: frequent blood pressure checks, continuous pulse oximetry and heart rate  Block type: Adductor Canal  Laterality: right  Injection technique: single-shot  Procedures: ultrasound guided, Ultrasound guidance required for the procedure to increase accuracy and safety of medication placement and decrease risk of complications.  Ultrasound permanent image saved  bupivacaine (PF) (MARCAINE) 0.5 % injection 20 mL - Perineural   10 mL - 11/13/2024 11:19:00 AM  bupivacaine liposomal (EXPAREL) 1.3 % injection 20 mL - Perineural   20 mL - 11/13/2024 11:19:00 AM  midazolam (VERSED) injection 0.5 mg - Intravenous   2 mg - 11/13/2024 11:18:00 AM  Needle  Needle type: Stimuplex   Needle localization: anatomical landmarks and ultrasound guidance  Assessment  Injection assessment: incremental injection, frequent aspiration, injected with ease, negative aspiration, negative for heart rate change, no paresthesia on injection, no symptoms of intraneural/intravenous injection and needle tip visualized at all times  Paresthesia pain: none  Post-procedure:  site cleaned  patient tolerated the procedure well with no immediate complications

## 2024-11-14 ENCOUNTER — TELEPHONE (OUTPATIENT)
Dept: OBGYN CLINIC | Facility: HOSPITAL | Age: 74
End: 2024-11-14

## 2024-11-14 NOTE — OP NOTE
OPERATIVE REPORT  PATIENT NAME: Shraddha Frazier  : 1950  MRN: [id number]  Pt Location:  [facility] OR ROOM 03    Surgery Date: 2024    Surgeons and Role:     * Danny Cardneas MD - Primary     * Rock Dixon PA-C - Assisting     Preop Diagnosis:  Primary osteoarthritis of right knee [M17.11]    Post-Op Diagnosis Codes:     * Primary osteoarthritis of right knee [M17.11]    Procedure(s):  Right - ARTHROPLASTY KNEE UNICOMPARTMENTAL (CPT: 69040)    Specimens:  * No specimens in log *    Estimated Blood Loss:   30 mL    Drains:  * No LDAs found *    Anesthesia Type:   Choice     Operative Indications:  Primary osteoarthritis of right knee [M17.11]  See clinic note for complete list of indications    Operative Findings:  Full thickness wear to the medial compartment  Well preserved lateral and patellofemoral compartments    Complications:   None    Knee Approach: Sub-vastus    Chronic Narcotic Use:  No      Procedure and Technique:  Implants:   Medacta Motomedial distal femur, size 2  Medacta Motomedial proximal tibia, size 1  Medacta Nadya polyethylene liner, 8mm      After appropriate anesthesia was induced, the patient was placed on the operating room table and all bony prominences were padded.  An upper thigh tourniquet was placed and the right lower extremity was prepped and draped in the usual sterile fashion.  A timeout was performed to confirm patient, laterality, procedure and implants.  All were in agreement the procedure began.     The limb was elevated for exsanguination and the tourniquet was inflated to 250 mmHg. A #10 scalpel was used to make a midline incision sharply to the anterior aspect of the knee down to the level of extensor mechanism.  Metzenbaum lizbet were then utilized to create a medial and small lateral full-thickness skin flap.  Metzenbaum lizbet were again used to enter the crural fascia of the vastus medialis muscle compartment.  The muscle was bluntly dissected off of the  intermuscular septum and retracted off of the underlying capsule with a double angled Hohmann.  The knee was then brought to mid flexion and a new #10 scalpel was utilized to perform a subvastus approach.      The lateral and patellofemoral compartments of the knee were visualized and found to have no wear.  The decision was made to proceed with medial unicompartmental knee arthroplasty.  An extramedullary tibial cutting guide was placed in the appropriate coronal, sagittal and depth planes.  Reciprocating an oscillating saw was then utilized to create the proximal tibial cut being sure to protect the patellar tendon and MCL.  The excised proximal tibial bone was then removed.  A spacer block was used to confirm our gap and then the distal femur cutting guide was inserted with the knee in extension.  Once pinned in place, an oscillating saw was used to perform the distal femur cut.  The knee was then brought into flexion and the 2-in-1 cutting block was then applied to the distal femur.  The posterior condyles and posterior chamfer cuts were then made with an oscillating saw and the lug holes were drilled.     The tibia was sized and the medial meniscus was excised with electrocautery.  A tibial baseplate was impacted in place followed by a distal femur trial and polyethylene liner.  The knee was brought through range of motion and found to have excellent stability and tracking.  The trials were removed, local anesthesia was administered to the pericapsular tissues and the knee was thoroughly irrigated with sterile saline.  Once the cup bony surfaces were thoroughly dried, cement was pressurized into the proximal tibia and the proximal tibial baseplate was impacted into place.  The process was repeated with the distal femur component.  A polyethylene liner was then impacted into place and excess cement was removed.     The knee was then irrigated with sterile saline and we began the process of closure.  The joint  capsule was approximated with a #1 Vicryl suture in interrupted fashion followed by a #1 strata fix suture.  Subdermal tissues were closed with 2-0 Vicryl in interrupted fashion and the skin was closed with a 3-0 Monocryl in a running fashion.  A glue and mesh dressing was applied to the skin followed by 4 x 4 gauze, ABD pad, Webril and Ace bandage.  Anesthesia was discontinued and the patient was taken to the PACU in stable condition.     No competent resident was available to assist in the case. Bryson Dixon PA-c was necessary for safe positioning, retraction and closure.  I was present for the entirety of the case         SIGNATURE: Danny Cardenas MD  DATE: November 13, 2024  TIME: 9:12 PM

## 2024-11-14 NOTE — ANESTHESIA POSTPROCEDURE EVALUATION
Post-Op Assessment Note    CV Status:  Stable  Pain Score: 0    Pain management: adequate       Mental Status:  Alert and awake   Hydration Status:  Euvolemic   PONV Controlled:  Controlled   Airway Patency:  Patent     Post Op Vitals Reviewed: Yes    No anethesia notable event occurred.    Staff: Anesthesiologist, with CRNAs           Last Filed PACU Vitals:  Vitals Value Taken Time   Temp 97.2 °F (36.2 °C) 11/13/24 1350   Pulse 64 11/13/24 1350   /66 11/13/24 1350   Resp 18 11/13/24 1350   SpO2 95 % 11/13/24 1350       Modified Erica:  Activity: 2 (11/13/2024  1:47 PM)  Respiration: 2 (11/13/2024  1:47 PM)  Circulation: 2 (11/13/2024  1:47 PM)  Consciousness: 1 (11/13/2024  1:47 PM)  Oxygen Saturation: 1 (11/13/2024  1:47 PM)  Modified Erica Score: 8 (11/13/2024  1:47 PM)

## 2024-11-14 NOTE — TELEPHONE ENCOUNTER
"Contacted patient for Postoperative Follow up call assessment, using  #[id number]    She reports \"much better\" and \"not much pain\" stating current pain is 4/10. She reports her dressing is dry, denies drainage, and swelling is \"good\" and states \"no swelling.\" We discussed postoperative swelling, and starting to ICE areas of pain/swelling, especially after increased activity over the next few weeks.      We reviewed her new DC medications, and she confirmed taking the following as prescribed:   Tylenol 1000mg TID  Oxycodone 5mg PRN   Celebrex 200mg BID  ASA 81mg BID  I recommended she start an OTC stool softener, she is not currently take one. She denies needing at this point, and feels confident she will have BM without.       She denies any chest pain, SOB, dizziness, fever, calf pain or any addtl symptoms. She denies concerns or symptoms at this time. pt encouraged to call me with questions, concerns or issues.    "

## 2024-11-26 ENCOUNTER — OFFICE VISIT (OUTPATIENT)
Dept: OBGYN CLINIC | Facility: CLINIC | Age: 74
End: 2024-11-26

## 2024-11-26 VITALS
HEIGHT: 64 IN | HEART RATE: 92 BPM | BODY MASS INDEX: 27.31 KG/M2 | DIASTOLIC BLOOD PRESSURE: 82 MMHG | SYSTOLIC BLOOD PRESSURE: 124 MMHG | WEIGHT: 160 LBS

## 2024-11-26 DIAGNOSIS — Z96.651 STATUS POST TOTAL RIGHT KNEE REPLACEMENT: Primary | ICD-10-CM

## 2024-11-26 DIAGNOSIS — Z96.651 AFTERCARE FOLLOWING RIGHT KNEE JOINT REPLACEMENT SURGERY: ICD-10-CM

## 2024-11-26 DIAGNOSIS — Z96.651 S/P RIGHT UNICOMPARTMENTAL KNEE REPLACEMENT: ICD-10-CM

## 2024-11-26 DIAGNOSIS — Z47.1 AFTERCARE FOLLOWING RIGHT KNEE JOINT REPLACEMENT SURGERY: ICD-10-CM

## 2024-11-26 PROCEDURE — 99024 POSTOP FOLLOW-UP VISIT: CPT | Performed by: STUDENT IN AN ORGANIZED HEALTH CARE EDUCATION/TRAINING PROGRAM

## 2024-11-26 NOTE — PROGRESS NOTES
Date: 24  Shraddha Frazier   MRN# [id number]  : 1950      Chief Complaint: Post op right  unicompartmental right knee     Assessment and Plan:    S/P right unicompartmental knee replacement  Patient is 2 weeks s/p right arthroplasty knee unicompartmental   - WBAT  - wean off assistive devices as tolerated   - Tylenol and Celebrex for pain control prn  - Begin formal physical therapy for purposes of restoration of ROM and gait training. Patient is needing to get to 120 degrees flexion, she is currently at 80.  - ASA 81mg BID for dvt ppx   - May shower, do not soak or submerge incision  - RTC in 4 weeks. right knee xrays needed          Subjective:   Patient is 2 weeks s/p right  unicompartmental arthroplasty     Date of procedure: 2024  Doing well, no new problems  Pain progressively improving  Improved swelling  Ambulating with walker    Allergy:  Allergies   Allergen Reactions    Aspirin     Penicillins Other (See Comments) and Rash     Medications:  all current active meds have been reviewed    Past Medical History:  Past Medical History:   Diagnosis Date    Chronic cough 2021    Decreased hearing, left 2019    Echocardiogram abnormal 2016    Mild LVH with normal systolic function EF > 70%. Grade I Diastolic dysfunction. Aortic sclerosis w/o significant stenosis. Mild insufficiency. Mild MR. Mild TR with normal pulmonary artery pressure.     History of mammogram 2017    Benign    History of mammogram 02/15/2016    Benign    HTN (hypertension)     Hyperlipidemia     Hypertension     Osteopenia determined by x-ray 2016    osteopenia of the left femoral neck and normal bone marrow density of the left hip total. Osteopenia of the lumbar spine.     Primary osteoarthritis of left knee 2023    Primary osteoarthritis of right knee 2020     Past Surgical History:  Past Surgical History:   Procedure Laterality Date    BLADDER SUSPENSION       HYSTERECTOMY      age 45    CA ARTHRP KNEE CONDYLE&PLATEAU MEDIAL/LAT CMPRT Right 11/13/2024    Procedure: ARTHROPLASTY KNEE UNICOMPARTMENTAL;  Surgeon: Danny Cardenas MD;  Location:  MAIN OR;  Service: Orthopedics     Family History:  Family History   Problem Relation Age of Onset    No Known Problems Mother     No Known Problems Father     No Known Problems Sister     No Known Problems Sister     No Known Problems Sister     No Known Problems Sister     No Known Problems Sister     No Known Problems Sister     No Known Problems Sister     No Known Problems Sister     No Known Problems Daughter     No Known Problems Maternal Grandmother     No Known Problems Maternal Grandfather     No Known Problems Paternal Grandmother     No Known Problems Paternal Grandfather     No Known Problems Maternal Aunt     No Known Problems Paternal Aunt     No Known Problems Paternal Aunt     No Known Problems Paternal Aunt     No Known Problems Paternal Aunt     No Known Problems Paternal Aunt      Social History:  Social History     Substance and Sexual Activity   Alcohol Use Not Currently    Alcohol/week: 1.0 standard drink of alcohol    Types: 1 Glasses of wine per week     Social History     Substance and Sexual Activity   Drug Use Never     Social History     Tobacco Use   Smoking Status Never    Passive exposure: Never   Smokeless Tobacco Never           Review of Systems:  General- denies fever/chills  HEENT- denies hearing loss or sore throat  Eyes- denies eye pain or visual disturbances, denies red eyes  Respiratory- denies cough or SOB  Cardio- denies chest pain or palpitations  GI- denies abdominal pain  Endocrine- denies urinary frequency  Urinary- denies pain with urination  Musculoskeletal- Negative except noted above  Skin- denies rashes or wounds  Neurological- denies dizziness or headache  Psychiatric- denies anxiety or difficulty concentrating      Objective:   BP Readings from Last 1 Encounters:   11/26/24  "124/82      Wt Readings from Last 1 Encounters:   11/26/24 72.6 kg (160 lb)      Pulse Readings from Last 1 Encounters:   11/26/24 92        BMI: Estimated body mass index is 27.46 kg/m² as calculated from the following:    Height as of this encounter: 5' 4\" (1.626 m).    Weight as of this encounter: 72.6 kg (160 lb).      Physical Exam  /82   Pulse 92   Ht 5' 4\" (1.626 m)   Wt 72.6 kg (160 lb)   LMP  (LMP Unknown)   BMI 27.46 kg/m²   General/Constitutional: No apparent distress: well-nourished and well developed.  Eyes: normal ocular motion  Cardio: RRR, Normal S1S2, No m/r/g.   Lymphatic: No appreciable lymphadenopathy  Respiratory: Non-labored breathing, CTA b/l no w/c/r  Vascular: No edema, swelling or tenderness, except as noted in detailed exam. Extremities well perfused. No LE edema  Integumentary: No impressive skin lesions present, except as noted in detailed exam.  Neuro: No ataxia or tremors noted  Psych: Normal mood and affect, oriented to person, place and time. Appropriate affect.  Musculoskeletal: Normal, except as noted in detailed exam and in HPI.    Gait and Station:   antalgic    right Knee Examination  Incision: clean, dry, and intact  Effusion: mild  Alignment: normal  AP Stability: stable  Coronal Stability  Extension:stable  Mid-flexion: stable  90 degrees flexion: stable  Range of motion  Active: 5 to 80    Extensor lag: Absent  Motor: 5/5 EHL/FHL/TA/GS/QD/HS  Neurovascular exam is intact.   No evidence of calf tightness or posterior cords    Images:  No new imaging    I personally reviewed relevant images in the PACS system and my interpretation is as follows:  X-rays of the right knee reveal a unicompartmental knee arthroplasty in appropriate alignment without evidence of migration, wear or loosening.        Scribe Attestation      I,:  Lizet Preston am acting as a scribe while in the presence of the attending physician.:       I,:  Danny Cardenas MD personally " performed the services described in this documentation    as scribed in my presence.:               Danny Cardenas MD  Adult Reconstruction Specialist   [facility]

## 2024-11-27 PROBLEM — M17.11 OSTEOARTHRITIS OF RIGHT KNEE: Status: RESOLVED | Noted: 2024-02-05 | Resolved: 2024-11-27

## 2024-11-27 PROBLEM — M23.91 INTERNAL DERANGEMENT OF RIGHT KNEE: Status: RESOLVED | Noted: 2024-09-20 | Resolved: 2024-11-27

## 2024-11-27 PROBLEM — Z96.651 S/P RIGHT UNICOMPARTMENTAL KNEE REPLACEMENT: Status: ACTIVE | Noted: 2024-11-27

## 2024-11-27 PROBLEM — M17.11 PRIMARY OSTEOARTHRITIS OF RIGHT KNEE: Status: RESOLVED | Noted: 2020-08-07 | Resolved: 2024-11-27

## 2024-11-27 NOTE — ASSESSMENT & PLAN NOTE
Patient is 2 weeks s/p right arthroplasty knee unicompartmental   - WBAT  - wean off assistive devices as tolerated   - Tylenol and Celebrex for pain control prn  - Begin formal physical therapy for purposes of restoration of ROM and gait training. Patient is needing to get to 120 degrees flexion, she is currently at 80.  - ASA 81mg BID for dvt ppx   - May shower, do not soak or submerge incision  - RTC in 4 weeks. right knee xrays needed

## 2024-12-05 ENCOUNTER — TELEPHONE (OUTPATIENT)
Age: 74
End: 2024-12-05

## 2024-12-05 NOTE — TELEPHONE ENCOUNTER
Caller: Patient     Doctor: Lexie     Reason for call: Asked about a report of her recent surgery to be sent to her PCP. I did tell them  is part of [facility] and can see the information that is needed. I did give the patient our fax number incase she wanted to send any [employer] paperwork to us.

## 2024-12-06 ENCOUNTER — OFFICE VISIT (OUTPATIENT)
Dept: FAMILY MEDICINE CLINIC | Facility: CLINIC | Age: 74
End: 2024-12-06

## 2024-12-06 VITALS
DIASTOLIC BLOOD PRESSURE: 70 MMHG | SYSTOLIC BLOOD PRESSURE: 110 MMHG | WEIGHT: 162 LBS | HEIGHT: 64 IN | TEMPERATURE: 98 F | BODY MASS INDEX: 27.66 KG/M2 | RESPIRATION RATE: 16 BRPM | OXYGEN SATURATION: 98 % | HEART RATE: 78 BPM

## 2024-12-06 DIAGNOSIS — Z02.89 ENCOUNTER FOR COMPLETION OF FORM WITH PATIENT: Primary | ICD-10-CM

## 2024-12-06 PROCEDURE — 99213 OFFICE O/P EST LOW 20 MIN: CPT

## 2024-12-06 PROCEDURE — G2211 COMPLEX E/M VISIT ADD ON: HCPCS

## 2024-12-06 NOTE — PROGRESS NOTES
Name: Shraddha Frazier      : 1950      MRN: [id number]  Encounter Provider: MINDY Teague  Encounter Date: 2024   Encounter department: [facility]  :  Assessment & Plan  Encounter for completion of form with patient  Called insurance company, unable to get in contact with , MARIANNE. Awaiting call back. Will contact patient once I receive  a call.              Depression Screening and Follow-up Plan: Patient was screened for depression during today's encounter. They screened negative with a PHQ-2 score of 0.      History of Present Illness     Shraddha Frazier is a 74 y.o. female  has a past medical history of Chronic cough, Decreased hearing, left, Echocardiogram abnormal, History of mammogram, History of mammogram, HTN (hypertension), Hyperlipidemia, Hypertension, Osteopenia determined by x-ray, Primary osteoarthritis of left knee, and Primary osteoarthritis of right knee.  has a past surgical history that includes Hysterectomy; Bladder suspension; and pr arthrp knee condyle&plateau medial/lat cmprt (Right, 2024).    Here today to discuss FMLA. Form brought in today is for fmla past 24 which does not make sense because her FMLA was resubmitted on 10/29/24 approved until 24      Review of Systems   Constitutional:  Negative for chills and fever.   HENT:  Negative for ear pain and sore throat.    Eyes:  Negative for pain and visual disturbance.   Respiratory:  Negative for cough and shortness of breath.    Cardiovascular:  Negative for chest pain and palpitations.   Gastrointestinal:  Negative for abdominal pain and vomiting.   Genitourinary:  Negative for dysuria and hematuria.   Musculoskeletal:  Negative for arthralgias and back pain.   Skin:  Negative for color change and rash.   Neurological:  Negative for seizures and syncope.   All other systems reviewed and are negative.         Objective   /70 (BP  "Location: Right arm, Patient Position: Sitting, Cuff Size: Standard)   Pulse 78   Temp 98 °F (36.7 °C) (Temporal)   Resp 16   Ht 5' 4\" (1.626 m)   Wt 73.5 kg (162 lb)   LMP  (LMP Unknown)   SpO2 98%   BMI 27.81 kg/m²      Physical Exam  Vitals and nursing note reviewed.   Constitutional:       General: She is not in acute distress.     Appearance: Normal appearance. She is well-developed.   HENT:      Head: Normocephalic and atraumatic.      Right Ear: External ear normal.      Left Ear: External ear normal.      Nose: Nose normal.   Eyes:      Conjunctiva/sclera: Conjunctivae normal.   Cardiovascular:      Rate and Rhythm: Normal rate.   Pulmonary:      Effort: Pulmonary effort is normal.   Musculoskeletal:         General: Normal range of motion.      Cervical back: Normal range of motion and neck supple.   Skin:     General: Skin is warm and dry.   Neurological:      Mental Status: She is alert and oriented to person, place, and time. Mental status is at baseline.   Psychiatric:         Mood and Affect: Mood normal.         Behavior: Behavior normal.         Thought Content: Thought content normal.         Judgment: Judgment normal.       Administrative Statements   I have spent a total time of 20 minutes in caring for this patient on the day of the visit/encounter including Obtaining or reviewing history   and communicating with insurance .   "

## 2024-12-11 ENCOUNTER — TELEPHONE (OUTPATIENT)
Dept: FAMILY MEDICINE CLINIC | Facility: CLINIC | Age: 74
End: 2024-12-11

## 2024-12-11 ENCOUNTER — RA CDI HCC (OUTPATIENT)
Dept: OTHER | Facility: HOSPITAL | Age: 74
End: 2024-12-11

## 2024-12-11 NOTE — TELEPHONE ENCOUNTER
----- Message from MINDY Carter sent at 12/10/2024  8:09 AM EST -----  Regarding: [facility],  Can we please call this pt and let her know that according to the [place] company, no additional paper work is needed for her approved return to work date in February.

## 2024-12-18 ENCOUNTER — OFFICE VISIT (OUTPATIENT)
Dept: FAMILY MEDICINE CLINIC | Facility: CLINIC | Age: 74
End: 2024-12-18

## 2024-12-18 VITALS
SYSTOLIC BLOOD PRESSURE: 128 MMHG | HEIGHT: 64 IN | WEIGHT: 159.7 LBS | HEART RATE: 75 BPM | DIASTOLIC BLOOD PRESSURE: 84 MMHG | TEMPERATURE: 97.8 F | RESPIRATION RATE: 18 BRPM | OXYGEN SATURATION: 96 % | BODY MASS INDEX: 27.26 KG/M2

## 2024-12-18 DIAGNOSIS — Z23 ENCOUNTER FOR IMMUNIZATION: ICD-10-CM

## 2024-12-18 DIAGNOSIS — I10 ESSENTIAL HYPERTENSION: ICD-10-CM

## 2024-12-18 DIAGNOSIS — E78.2 MIXED HYPERLIPIDEMIA: ICD-10-CM

## 2024-12-18 DIAGNOSIS — Z00.00 ENCOUNTER FOR ANNUAL WELLNESS VISIT (AWV) IN MEDICARE PATIENT: Primary | ICD-10-CM

## 2024-12-18 PROCEDURE — 90662 IIV NO PRSV INCREASED AG IM: CPT

## 2024-12-18 PROCEDURE — G0439 PPPS, SUBSEQ VISIT: HCPCS

## 2024-12-18 PROCEDURE — 90471 IMMUNIZATION ADMIN: CPT

## 2024-12-18 PROCEDURE — 99214 OFFICE O/P EST MOD 30 MIN: CPT

## 2024-12-18 NOTE — ASSESSMENT & PLAN NOTE
Lab Results   Component Value Date    CHOLESTEROL 149 09/18/2024    CHOLESTEROL 145 11/14/2023    CHOLESTEROL 181 10/26/2022     Lab Results   Component Value Date    HDL 48 (L) 09/18/2024    HDL 56 11/14/2023    HDL 44 (L) 10/26/2022     Lab Results   Component Value Date    TRIG 121 09/18/2024    TRIG 88 11/14/2023    TRIG 132 10/26/2022     Lab Results   Component Value Date    NONHDLC 101 09/18/2024    NONHDLC 137 10/26/2022    NONHDLC 96 04/15/2019     Lab Results   Component Value Date    LDLCALC 77 09/18/2024   Continue Lipitor 40 mg daily

## 2024-12-18 NOTE — PROGRESS NOTES
Name: Shraddha Frazier      : 1950      MRN: [id number]  Encounter Provider: MINDY Teague  Encounter Date: 2024   Encounter department: [facility]    Assessment & Plan  Encounter for annual wellness visit (AWV) in Medicare patient         Encounter for immunization      Orders:    influenza vaccine, high-dose, PF 0.5 mL (Fluzone High Dose)    Essential hypertension  BP Readings from Last 3 Encounters:   24 128/84   24 110/70   24 124/82     - At goal. Continue Losartan 100 mg & amlodipine 10 mg daily.          Mixed hyperlipidemia  Lab Results   Component Value Date    CHOLESTEROL 149 2024    CHOLESTEROL 145 2023    CHOLESTEROL 181 10/26/2022     Lab Results   Component Value Date    HDL 48 (L) 2024    HDL 56 2023    HDL 44 (L) 10/26/2022     Lab Results   Component Value Date    TRIG 121 2024    TRIG 88 2023    TRIG 132 10/26/2022     Lab Results   Component Value Date    NONHDLC 101 2024    NONHDLC 137 10/26/2022    NONHDLC 96 04/15/2019     Lab Results   Component Value Date    LDLCALC 77 2024   Continue Lipitor 40 mg daily         BMI Counseling: Body mass index is 27.41 kg/m². The BMI is above normal. Nutrition recommendations include decreasing portion sizes, encouraging healthy choices of fruits and vegetables, decreasing fast food intake, consuming healthier snacks, limiting drinks that contain sugar, moderation in carbohydrate intake, increasing intake of lean protein, reducing intake of saturated and trans fat and reducing intake of cholesterol. Exercise recommendations include moderate physical activity 150 minutes/week. No pharmacotherapy was ordered. Rationale for BMI follow-up plan is due to patient being overweight or obese.       Preventive health issues were discussed with patient, and age appropriate screening tests were ordered as noted in patient's After Visit  Summary. Personalized health advice and appropriate referrals for health education or preventive services given if needed, as noted in patient's After Visit Summary.    History of Present Illness     Shraddha Frazier is a 74 y.o. female  has a past medical history of Chronic cough, Decreased hearing, left, Echocardiogram abnormal, History of mammogram, History of mammogram, HTN (hypertension), Hyperlipidemia, Hypertension, Osteopenia determined by x-ray, Primary osteoarthritis of left knee, and Primary osteoarthritis of right knee.  has a past surgical history that includes Hysterectomy; Bladder suspension; and pr arthrp knee condyle&plateau medial/lat cmprt (Right, 11/13/2024).           Patient Care Team:  MINDY Teague as PCP - General (Nurse Practitioner)  Lisette Swanson MD as PCP - [facility]CP-[facility] ([facility])  Lisette Swanson MD as PCP - PCP-[place] ([facility])  Regla Mallory MD as Resident (Family Medicine)  Hollie Doe RN as Nurse Navigator (Orthopedics)    Review of Systems   Constitutional:  Negative for chills and fever.   HENT:  Negative for ear pain and sore throat.    Eyes:  Negative for pain and visual disturbance.   Respiratory:  Negative for cough and shortness of breath.    Cardiovascular:  Negative for chest pain and palpitations.   Gastrointestinal:  Negative for abdominal pain and vomiting.   Genitourinary:  Negative for dysuria and hematuria.   Musculoskeletal:  Positive for arthralgias. Negative for back pain.   Skin:  Negative for color change and rash.   Neurological:  Negative for seizures and syncope.   All other systems reviewed and are negative.    Medical History Reviewed by provider this encounter:  Tobacco  Allergies  Meds  Problems  Med Hx  Surg Hx  Fam Hx       Annual Wellness Visit Questionnaire       Health Risk Assessment:   Patient rates overall health as good. Patient feels that their physical health rating is same. Patient is satisfied with their  life. Eyesight was rated as same. Hearing was rated as slightly worse. Patient feels that their emotional and mental health rating is same. Patients states they are often angry. Patient states they are never, rarely unusually tired/fatigued. Pain experienced in the last 7 days has been some. Patient's pain rating has been 5/10. Patient states that she has experienced no weight loss or gain in last 6 months.     Fall Risk Screening:   In the past year, patient has experienced: no history of falling in past year      Urinary Incontinence Screening:   Patient has not leaked urine accidently in the last six months.     Home Safety:  Patient has trouble with stairs inside or outside of their home. Patient has working smoke alarms and has working carbon monoxide detector. Home safety hazards include: none.     Medications:   Patient is not currently taking any over-the-counter supplements. Patient is able to manage medications.     Activities of Daily Living (ADLs)/Instrumental Activities of Daily Living (IADLs):   Walk and transfer into and out of bed and chair?: Yes  Dress and groom yourself?: Yes    Bathe or shower yourself?: Yes    Feed yourself? Yes  Do your laundry/housekeeping?: No  Manage your money, pay your bills and track your expenses?: Yes  Make your own meals?: No    Do your own shopping?: No    ADL comments: Daughter helps with ADLs     Previous Hospitalizations:   Any hospitalizations or ED visits within the last 12 months?: Yes    How many hospitalizations have you had in the last year?: 3-4    Advance Care Planning:   Living will: No    Durable POA for healthcare: No    Advanced directive: No      PREVENTIVE SCREENINGS      Cardiovascular Screening:    General: Screening Not Indicated and History Lipid Disorder      Diabetes Screening:     General: Screening Current      Colorectal Cancer Screening:     General: Screening Current      Breast Cancer Screening:     General: Screening Current      Cervical  Cancer Screening:    General: Screening Not Indicated      Osteoporosis Screening:    General: Screening Not Indicated and History Osteoporosis      Lung Cancer Screening:     General: Screening Not Indicated      Hepatitis C Screening:    General: Screening Current    Screening, Brief Intervention, and Referral to Treatment (SBIRT)    Screening      Single Item Drug Screening:  How often have you used an illegal drug (including marijuana) or a prescription medication for non-medical reasons in the past year? never    Single Item Drug Screen Score: 0  Interpretation: Negative screen for possible drug use disorder    SDOH Risk Assessment  Social determinants of health (SDOH) risk assesment tool was completed. The tool at a minimum covered housing stability, food insecurity, transportation needs, and utility difficulty. Patient had at risk responses for the following SDOH domains: financial resource strain and housing stability.     Review of Current Opioid Use    Opioid Risk Tool (ORT) Interpretation: Complete Opioid Risk Tool (ORT)    Social Drivers of Health     Financial Resource Strain: High Risk (12/18/2024)    Overall Financial Resource Strain (CARDIA)     Difficulty of Paying Living Expenses: Hard   Food Insecurity: No Food Insecurity (12/18/2024)    Hunger Vital Sign     Worried About Running Out of Food in the Last Year: Never true     Ran Out of Food in the Last Year: Never true   Transportation Needs: No Transportation Needs (12/18/2024)    PRAPARE - Transportation     Lack of Transportation (Medical): No     Lack of Transportation (Non-Medical): No   Housing Stability: High Risk (12/18/2024)    Housing Stability Vital Sign     Unable to Pay for Housing in the Last Year: Yes     Number of Times Moved in the Last Year: 1     Homeless in the Last Year: No   Utilities: Not At Risk (12/18/2024)    [facility] Utilities     Threatened with loss of utilities: No     No results found.    Objective   /84 (BP  "Location: Left arm, Patient Position: Sitting, Cuff Size: Standard)   Pulse 75   Temp 97.8 °F (36.6 °C) (Temporal)   Resp 18   Ht 5' 4\" (1.626 m)   Wt 72.4 kg (159 lb 11.2 oz)   LMP  (LMP Unknown)   SpO2 96%   BMI 27.41 kg/m²     Physical Exam  Vitals and nursing note reviewed.   Constitutional:       General: She is not in acute distress.     Appearance: Normal appearance. She is well-developed.   HENT:      Head: Normocephalic and atraumatic.      Right Ear: External ear normal.      Left Ear: External ear normal.      Nose: Nose normal.   Eyes:      Conjunctiva/sclera: Conjunctivae normal.   Cardiovascular:      Rate and Rhythm: Normal rate and regular rhythm.      Pulses: Normal pulses.      Heart sounds: Normal heart sounds. No murmur heard.  Pulmonary:      Effort: Pulmonary effort is normal. No respiratory distress.      Breath sounds: Normal breath sounds.   Abdominal:      Palpations: Abdomen is soft.      Tenderness: There is no abdominal tenderness.   Musculoskeletal:         General: Normal range of motion.      Cervical back: Normal range of motion and neck supple.      Comments: Ambulates with walker   Skin:     General: Skin is warm and dry.   Neurological:      General: No focal deficit present.      Mental Status: She is alert and oriented to person, place, and time. Mental status is at baseline.   Psychiatric:         Mood and Affect: Mood normal.         Behavior: Behavior normal.         Thought Content: Thought content normal.         Judgment: Judgment normal.         "

## 2024-12-18 NOTE — PATIENT INSTRUCTIONS
Medicare Preventive Visit Patient Instructions  Thank you for completing your Welcome to Medicare Visit or Medicare Annual Wellness Visit today. Your next wellness visit will be due in one year (12/19/2025).  The screening/preventive services that you may require over the next 5-10 years are detailed below. Some tests may not apply to you based off risk factors and/or age. Screening tests ordered at today's visit but not completed yet may show as past due. Also, please note that scanned in results may not display below.  Preventive Screenings:  Service Recommendations Previous Testing/Comments   Colorectal Cancer Screening  * Colonoscopy    * Fecal Occult Blood Test (FOBT)/Fecal Immunochemical Test (FIT)  * Fecal DNA/Cologuard Test  * Flexible Sigmoidoscopy Age: 45-75 years old   Colonoscopy: every 10 years (may be performed more frequently if at higher risk)  OR  FOBT/FIT: every 1 year  OR  Cologuard: every 3 years  OR  Sigmoidoscopy: every 5 years  Screening may be recommended earlier than age 45 if at higher risk for colorectal cancer. Also, an individualized decision between you and your healthcare provider will decide whether screening between the ages of 76-85 would be appropriate. Colonoscopy: 09/21/2022  FOBT/FIT: Not on file  Cologuard: Not on file  Sigmoidoscopy: Not on file    Screening Current     Breast Cancer Screening Age: 40+ years old  Frequency: every 1-2 years  Not required if history of left and right mastectomy Mammogram: 09/20/2024    Screening Current   Cervical Cancer Screening Between the ages of 21-29, pap smear recommended once every 3 years.   Between the ages of 30-65, can perform pap smear with HPV co-testing every 5 years.   Recommendations may differ for women with a history of total hysterectomy, cervical cancer, or abnormal pap smears in past. Pap Smear: Not on file    Screening Not Indicated   Hepatitis C Screening Once for adults born between 1945 and 1965  More frequently in  patients at high risk for Hepatitis C Hep C Antibody: 05/08/2023    Screening Current   Diabetes Screening 1-2 times per year if you're at risk for diabetes or have pre-diabetes Fasting glucose: 105 mg/dL (10/26/2024)  A1C: 5.6 % (10/26/2024)  Screening Current   Cholesterol Screening Once every 5 years if you don't have a lipid disorder. May order more often based on risk factors. Lipid panel: 09/18/2024    Screening Not Indicated  History Lipid Disorder     Other Preventive Screenings Covered by Medicare:  Abdominal Aortic Aneurysm (AAA) Screening: covered once if your at risk. You're considered to be at risk if you have a family history of AAA.  Lung Cancer Screening: covers low dose CT scan once per year if you meet all of the following conditions: (1) Age 55-77; (2) No signs or symptoms of lung cancer; (3) Current smoker or have quit smoking within the last 15 years; (4) You have a tobacco smoking history of at least 20 pack years (packs per day multiplied by number of years you smoked); (5) You get a written order from a healthcare provider.  Glaucoma Screening: covered annually if you're considered high risk: (1) You have diabetes OR (2) Family history of glaucoma OR (3)  aged 50 and older OR (4)  American aged 65 and older  Osteoporosis Screening: covered every 2 years if you meet one of the following conditions: (1) You're estrogen deficient and at risk for osteoporosis based off medical history and other findings; (2) Have a vertebral abnormality; (3) On glucocorticoid therapy for more than 3 months; (4) Have primary hyperparathyroidism; (5) On osteoporosis medications and need to assess response to drug therapy.   Last bone density test (DXA Scan): 07/30/2021.  HIV Screening: covered annually if you're between the age of 15-65. Also covered annually if you are younger than 15 and older than 65 with risk factors for HIV infection. For pregnant patients, it is covered up to 3 times  per pregnancy.    Immunizations:  Immunization Recommendations   Influenza Vaccine Annual influenza vaccination during flu season is recommended for all persons aged >= 6 months who do not have contraindications   Pneumococcal Vaccine   * Pneumococcal conjugate vaccine = PCV13 (Prevnar 13), PCV15 (Vaxneuvance), PCV20 (Prevnar 20)  * Pneumococcal polysaccharide vaccine = PPSV23 (Pneumovax) Adults 19-63 yo with certain risk factors or if 65+ yo  If never received any pneumonia vaccine: recommend Prevnar 20 (PCV20)  Give PCV20 if previously received 1 dose of PCV13 or PPSV23   Hepatitis B Vaccine 3 dose series if at intermediate or high risk (ex: diabetes, end stage renal disease, liver disease)   Respiratory syncytial virus (RSV) Vaccine - COVERED BY MEDICARE PART D  * RSVPreF3 (Arexvy) CDC recommends that adults 60 years of age and older may receive a single dose of RSV vaccine using shared clinical decision-making (SCDM)   Tetanus (Td) Vaccine - COST NOT COVERED BY MEDICARE PART B Following completion of primary series, a booster dose should be given every 10 years to maintain immunity against tetanus. Td may also be given as tetanus wound prophylaxis.   Tdap Vaccine - COST NOT COVERED BY MEDICARE PART B Recommended at least once for all adults. For pregnant patients, recommended with each pregnancy.   Shingles Vaccine (Shingrix) - COST NOT COVERED BY MEDICARE PART B  2 shot series recommended in those 19 years and older who have or will have weakened immune systems or those 50 years and older     Health Maintenance Due:      Topic Date Due   • Breast Cancer Screening: Mammogram  09/20/2026   • Colorectal Cancer Screening  09/18/2032   • Hepatitis C Screening  Completed     Immunizations Due:      Topic Date Due   • Influenza Vaccine (1) 09/01/2024   • COVID-19 Vaccine (3 - 2024-25 season) 09/01/2024     Advance Directives   What are advance directives?  Advance directives are legal documents that state your wishes  and plans for medical care. These plans are made ahead of time in case you lose your ability to make decisions for yourself. Advance directives can apply to any medical decision, such as the treatments you want, and if you want to donate organs.   What are the types of advance directives?  There are many types of advance directives, and each state has rules about how to use them. You may choose a combination of any of the following:  Living will:  This is a written record of the treatment you want. You can also choose which treatments you do not want, which to limit, and which to stop at a certain time. This includes surgery, medicine, IV fluid, and tube feedings.   Durable power of  for healthcare (DPAHC):  This is a written record that states who you want to make healthcare choices for you when you are unable to make them for yourself. This person, called a proxy, is usually a family member or a friend. You may choose more than 1 proxy.  Do not resuscitate (DNR) order:  A DNR order is used in case your heart stops beating or you stop breathing. It is a request not to have certain forms of treatment, such as CPR. A DNR order may be included in other types of advance directives.  Medical directive:  This covers the care that you want if you are in a coma, near death, or unable to make decisions for yourself. You can list the treatments you want for each condition. Treatment may include pain medicine, surgery, blood transfusions, dialysis, IV or tube feedings, and a ventilator (breathing machine).  Values history:  This document has questions about your views, beliefs, and how you feel and think about life. This information can help others choose the care that you would choose.  Why are advance directives important?  An advance directive helps you control your care. Although spoken wishes may be used, it is better to have your wishes written down. Spoken wishes can be misunderstood, or not followed.  Treatments may be given even if you do not want them. An advance directive may make it easier for your family to make difficult choices about your care.   Weight Management   Why it is important to manage your weight:  Being overweight increases your risk of health conditions such as heart disease, high blood pressure, type 2 diabetes, and certain types of cancer. It can also increase your risk for osteoarthritis, sleep apnea, and other respiratory problems. Aim for a slow, steady weight loss. Even a small amount of weight loss can lower your risk of health problems.  How to lose weight safely:  A safe and healthy way to lose weight is to eat fewer calories and get regular exercise. You can lose up about 1 pound a week by decreasing the number of calories you eat by 500 calories each day.   Healthy meal plan for weight management:  A healthy meal plan includes a variety of foods, contains fewer calories, and helps you stay healthy. A healthy meal plan includes the following:  Eat whole-grain foods more often.  A healthy meal plan should contain fiber. Fiber is the part of grains, fruits, and vegetables that is not broken down by your body. Whole-grain foods are healthy and provide extra fiber in your diet. Some examples of whole-grain foods are whole-wheat breads and pastas, oatmeal, brown rice, and bulgur.  Eat a variety of vegetables every day.  Include dark, leafy greens such as spinach, kale, morena greens, and mustard greens. Eat yellow and orange vegetables such as carrots, sweet potatoes, and winter squash.   Eat a variety of fruits every day.  Choose fresh or canned fruit (canned in its own juice or light syrup) instead of juice. Fruit juice has very little or no fiber.  Eat low-fat dairy foods.  Drink fat-free (skim) milk or 1% milk. Eat fat-free yogurt and low-fat cottage cheese. Try low-fat cheeses such as mozzarella and other reduced-fat cheeses.  Choose meat and other protein foods that are low in fat.   Choose beans or other legumes such as split peas or lentils. Choose fish, skinless poultry (chicken or turkey), or lean cuts of red meat (beef or pork). Before you cook meat or poultry, cut off any visible fat.   Use less fat and oil.  Try baking foods instead of frying them. Add less fat, such as margarine, sour cream, regular salad dressing and mayonnaise to foods. Eat fewer high-fat foods. Some examples of high-fat foods include french fries, doughnuts, ice cream, and cakes.  Eat fewer sweets.  Limit foods and drinks that are high in sugar. This includes candy, cookies, regular soda, and sweetened drinks.  Exercise:  Exercise at least 30 minutes per day on most days of the week. Some examples of exercise include walking, biking, dancing, and swimming. You can also fit in more physical activity by taking the stairs instead of the elevator or parking farther away from stores. Ask your healthcare provider about the best exercise plan for you.   Narcotic (Opioid) Safety    Use narcotics safely:  Take prescribed narcotics exactly as directed  Do not give narcotics to others or take narcotics that belong to someone else  Do not mix narcotics without medicines or alcohol  Do not drive or operate heavy machinery after you take the narcotic  Monitor for side effects and notify your healthcare provider if you experienced side effects such as nausea, sleepiness, itching, or trouble thinking clearly.    Manage constipation:    Constipation is the most common side effect of narcotic medicine. Constipation is when you have hard, dry bowel movements, or you go longer than usual between bowel movements. Tell your healthcare provider about all changes in your bowel movements while you are taking narcotics. He or she may recommend laxative medicine to help you have a bowel movement. He or she may also change the kind of narcotic you are taking, or change when you take it. The following are more ways you can prevent or relieve  constipation:    Drink liquids as directed.  You may need to drink extra liquids to help soften and move your bowels. Ask how much liquid to drink each day and which liquids are best for you.  Eat high-fiber foods.  This may help decrease constipation by adding bulk to your bowel movements. High-fiber foods include fruits, vegetables, whole-grain breads and cereals, and beans. Your healthcare provider or dietitian can help you create a high-fiber meal plan. Your provider may also recommend a fiber supplement if you cannot get enough fiber from food.  Exercise regularly.  Regular physical activity can help stimulate your intestines. Walking is a good exercise to prevent or relieve constipation. Ask which exercises are best for you.  Schedule a time each day to have a bowel movement.  This may help train your body to have regular bowel movements. Bend forward while you are on the toilet to help move the bowel movement out. Sit on the toilet for at least 10 minutes, even if you do not have a bowel movement.    Store narcotics safely:   Store narcotics where others cannot easily get them.  Keep them in a locked cabinet or secure area. Do not  keep them in a purse or other bag you carry with you. A person may be looking for something else and find the narcotics.  Make sure narcotics are stored out of the reach of children.  A child can easily overdose on narcotics. Narcotics may look like candy to a small child.    The best way to dispose of narcotics:      The laws vary by country and area. In the [place], the best way is to return the narcotics through a take-back program. This program is offered by the [place] Drug Enforcement Agency (RIZWAN). The following are options for using the program:  Take the narcotics to a [employer] collection site.  The site is often a law enforcement center. Call your local law enforcement center for scheduled take-back days in your area. You will be given information on where to go if the  collection site is in a different location.  Take the narcotics to an approved pharmacy or hospital.  A pharmacy or hospital may be set up as a collection site. You will need to ask if it is a [employer] collection site if you were not directed there. A pharmacy or doctor's office may not be able to take back narcotics unless it is a R[employer] site.  Use a mail-back system.  This means you are given containers to put the narcotics into. You will then mail them in the containers.  Use a take-back drop box.  This is a place to leave the narcotics at any time. People and animals will not be able to get into the box. Your local law enforcement agency can tell you where to find a drop box in your area.    Other ways to manage pain:   Ask your healthcare provider about non-narcotic medicines to control pain.  Nonprescription medicines include NSAIDs (such as ibuprofen) and acetaminophen. Prescription medicines include muscle relaxers, antidepressants, and steroids.  Pain may be managed without any medicines.  Some ways to relieve pain include massage, aromatherapy, or meditation. Physical or occupational therapy may also help.    For more information:   Drug Enforcement Administration  [address] [place] [address]  Phone: 1- 199 - 770-8761  Web Address: https://www.deadiversion.MailFrontieroj.gov/drug_disposal/    [place] Food and Drug Administration  [address] [place] [address]  Phone: 2- 789 - 611-1609  Web Address: http://www.fda.gov     © Copyright [employer] 2018 Information is for End User's use only and may not be sold, redistributed or otherwise used for commercial purposes. All illustrations and images included in CareNotes® are the copyrighted property of [employer] or [employer]      Medicare Preventive Visit Patient Instructions  Thank you for completing your Welcome to Medicare Visit or Medicare Annual Wellness Visit today. Your next wellness visit will be due in one year  (12/19/2025).  The screening/preventive services that you may require over the next 5-10 years are detailed below. Some tests may not apply to you based off risk factors and/or age. Screening tests ordered at today's visit but not completed yet may show as past due. Also, please note that scanned in results may not display below.  Preventive Screenings:  Service Recommendations Previous Testing/Comments   Colorectal Cancer Screening  * Colonoscopy    * Fecal Occult Blood Test (FOBT)/Fecal Immunochemical Test (FIT)  * Fecal DNA/Cologuard Test  * Flexible Sigmoidoscopy Age: 45-75 years old   Colonoscopy: every 10 years (may be performed more frequently if at higher risk)  OR  FOBT/FIT: every 1 year  OR  Cologuard: every 3 years  OR  Sigmoidoscopy: every 5 years  Screening may be recommended earlier than age 45 if at higher risk for colorectal cancer. Also, an individualized decision between you and your healthcare provider will decide whether screening between the ages of 76-85 would be appropriate. Colonoscopy: 09/21/2022  FOBT/FIT: Not on file  Cologuard: Not on file  Sigmoidoscopy: Not on file    Screening Current     Breast Cancer Screening Age: 40+ years old  Frequency: every 1-2 years  Not required if history of left and right mastectomy Mammogram: 09/20/2024    Screening Current   Cervical Cancer Screening Between the ages of 21-29, pap smear recommended once every 3 years.   Between the ages of 30-65, can perform pap smear with HPV co-testing every 5 years.   Recommendations may differ for women with a history of total hysterectomy, cervical cancer, or abnormal pap smears in past. Pap Smear: Not on file    Screening Not Indicated   Hepatitis C Screening Once for adults born between 1945 and 1965  More frequently in patients at high risk for Hepatitis C Hep C Antibody: 05/08/2023    Screening Current   Diabetes Screening 1-2 times per year if you're at risk for diabetes or have pre-diabetes Fasting glucose: 105  mg/dL (10/26/2024)  A1C: 5.6 % (10/26/2024)  Screening Current   Cholesterol Screening Once every 5 years if you don't have a lipid disorder. May order more often based on risk factors. Lipid panel: 09/18/2024    Screening Not Indicated  History Lipid Disorder     Other Preventive Screenings Covered by Medicare:  Abdominal Aortic Aneurysm (AAA) Screening: covered once if your at risk. You're considered to be at risk if you have a family history of AAA.  Lung Cancer Screening: covers low dose CT scan once per year if you meet all of the following conditions: (1) Age 55-77; (2) No signs or symptoms of lung cancer; (3) Current smoker or have quit smoking within the last 15 years; (4) You have a tobacco smoking history of at least 20 pack years (packs per day multiplied by number of years you smoked); (5) You get a written order from a healthcare provider.  Glaucoma Screening: covered annually if you're considered high risk: (1) You have diabetes OR (2) Family history of glaucoma OR (3)  aged 50 and older OR (4)  American aged 65 and older  Osteoporosis Screening: covered every 2 years if you meet one of the following conditions: (1) You're estrogen deficient and at risk for osteoporosis based off medical history and other findings; (2) Have a vertebral abnormality; (3) On glucocorticoid therapy for more than 3 months; (4) Have primary hyperparathyroidism; (5) On osteoporosis medications and need to assess response to drug therapy.   Last bone density test (DXA Scan): 07/30/2021.  HIV Screening: covered annually if you're between the age of 15-65. Also covered annually if you are younger than 15 and older than 65 with risk factors for HIV infection. For pregnant patients, it is covered up to 3 times per pregnancy.    Immunizations:  Immunization Recommendations   Influenza Vaccine Annual influenza vaccination during flu season is recommended for all persons aged >= 6 months who do not have  contraindications   Pneumococcal Vaccine   * Pneumococcal conjugate vaccine = PCV13 (Prevnar 13), PCV15 (Vaxneuvance), PCV20 (Prevnar 20)  * Pneumococcal polysaccharide vaccine = PPSV23 (Pneumovax) Adults 19-65 yo with certain risk factors or if 65+ yo  If never received any pneumonia vaccine: recommend Prevnar 20 (PCV20)  Give PCV20 if previously received 1 dose of PCV13 or PPSV23   Hepatitis B Vaccine 3 dose series if at intermediate or high risk (ex: diabetes, end stage renal disease, liver disease)   Respiratory syncytial virus (RSV) Vaccine - COVERED BY MEDICARE PART D  * RSVPreF3 (Arexvy) CDC recommends that adults 60 years of age and older may receive a single dose of RSV vaccine using shared clinical decision-making (SCDM)   Tetanus (Td) Vaccine - COST NOT COVERED BY MEDICARE PART B Following completion of primary series, a booster dose should be given every 10 years to maintain immunity against tetanus. Td may also be given as tetanus wound prophylaxis.   Tdap Vaccine - COST NOT COVERED BY MEDICARE PART B Recommended at least once for all adults. For pregnant patients, recommended with each pregnancy.   Shingles Vaccine (Shingrix) - COST NOT COVERED BY MEDICARE PART B  2 shot series recommended in those 19 years and older who have or will have weakened immune systems or those 50 years and older     Health Maintenance Due:      Topic Date Due   • Breast Cancer Screening: Mammogram  09/20/2026   • Colorectal Cancer Screening  09/18/2032   • Hepatitis C Screening  Completed     Immunizations Due:      Topic Date Due   • Influenza Vaccine (1) 09/01/2024   • COVID-19 Vaccine (3 - 2024-25 season) 09/01/2024     Advance Directives   What are advance directives?  Advance directives are legal documents that state your wishes and plans for medical care. These plans are made ahead of time in case you lose your ability to make decisions for yourself. Advance directives can apply to any medical decision, such as the  treatments you want, and if you want to donate organs.   What are the types of advance directives?  There are many types of advance directives, and each state has rules about how to use them. You may choose a combination of any of the following:  Living will:  This is a written record of the treatment you want. You can also choose which treatments you do not want, which to limit, and which to stop at a certain time. This includes surgery, medicine, IV fluid, and tube feedings.   Durable power of  for healthcare (DPAHC):  This is a written record that states who you want to make healthcare choices for you when you are unable to make them for yourself. This person, called a proxy, is usually a family member or a friend. You may choose more than 1 proxy.  Do not resuscitate (DNR) order:  A DNR order is used in case your heart stops beating or you stop breathing. It is a request not to have certain forms of treatment, such as CPR. A DNR order may be included in other types of advance directives.  Medical directive:  This covers the care that you want if you are in a coma, near death, or unable to make decisions for yourself. You can list the treatments you want for each condition. Treatment may include pain medicine, surgery, blood transfusions, dialysis, IV or tube feedings, and a ventilator (breathing machine).  Values history:  This document has questions about your views, beliefs, and how you feel and think about life. This information can help others choose the care that you would choose.  Why are advance directives important?  An advance directive helps you control your care. Although spoken wishes may be used, it is better to have your wishes written down. Spoken wishes can be misunderstood, or not followed. Treatments may be given even if you do not want them. An advance directive may make it easier for your family to make difficult choices about your care.   Weight Management   Why it is important to  manage your weight:  Being overweight increases your risk of health conditions such as heart disease, high blood pressure, type 2 diabetes, and certain types of cancer. It can also increase your risk for osteoarthritis, sleep apnea, and other respiratory problems. Aim for a slow, steady weight loss. Even a small amount of weight loss can lower your risk of health problems.  How to lose weight safely:  A safe and healthy way to lose weight is to eat fewer calories and get regular exercise. You can lose up about 1 pound a week by decreasing the number of calories you eat by 500 calories each day.   Healthy meal plan for weight management:  A healthy meal plan includes a variety of foods, contains fewer calories, and helps you stay healthy. A healthy meal plan includes the following:  Eat whole-grain foods more often.  A healthy meal plan should contain fiber. Fiber is the part of grains, fruits, and vegetables that is not broken down by your body. Whole-grain foods are healthy and provide extra fiber in your diet. Some examples of whole-grain foods are whole-wheat breads and pastas, oatmeal, brown rice, and bulgur.  Eat a variety of vegetables every day.  Include dark, leafy greens such as spinach, kale, morena greens, and mustard greens. Eat yellow and orange vegetables such as carrots, sweet potatoes, and winter squash.   Eat a variety of fruits every day.  Choose fresh or canned fruit (canned in its own juice or light syrup) instead of juice. Fruit juice has very little or no fiber.  Eat low-fat dairy foods.  Drink fat-free (skim) milk or 1% milk. Eat fat-free yogurt and low-fat cottage cheese. Try low-fat cheeses such as mozzarella and other reduced-fat cheeses.  Choose meat and other protein foods that are low in fat.  Choose beans or other legumes such as split peas or lentils. Choose fish, skinless poultry (chicken or turkey), or lean cuts of red meat (beef or pork). Before you cook meat or poultry, cut off  any visible fat.   Use less fat and oil.  Try baking foods instead of frying them. Add less fat, such as margarine, sour cream, regular salad dressing and mayonnaise to foods. Eat fewer high-fat foods. Some examples of high-fat foods include french fries, doughnuts, ice cream, and cakes.  Eat fewer sweets.  Limit foods and drinks that are high in sugar. This includes candy, cookies, regular soda, and sweetened drinks.  Exercise:  Exercise at least 30 minutes per day on most days of the week. Some examples of exercise include walking, biking, dancing, and swimming. You can also fit in more physical activity by taking the stairs instead of the elevator or parking farther away from stores. Ask your healthcare provider about the best exercise plan for you.   Narcotic (Opioid) Safety    Use narcotics safely:  Take prescribed narcotics exactly as directed  Do not give narcotics to others or take narcotics that belong to someone else  Do not mix narcotics without medicines or alcohol  Do not drive or operate heavy machinery after you take the narcotic  Monitor for side effects and notify your healthcare provider if you experienced side effects such as nausea, sleepiness, itching, or trouble thinking clearly.    Manage constipation:    Constipation is the most common side effect of narcotic medicine. Constipation is when you have hard, dry bowel movements, or you go longer than usual between bowel movements. Tell your healthcare provider about all changes in your bowel movements while you are taking narcotics. He or she may recommend laxative medicine to help you have a bowel movement. He or she may also change the kind of narcotic you are taking, or change when you take it. The following are more ways you can prevent or relieve constipation:    Drink liquids as directed.  You may need to drink extra liquids to help soften and move your bowels. Ask how much liquid to drink each day and which liquids are best for you.  Eat  high-fiber foods.  This may help decrease constipation by adding bulk to your bowel movements. High-fiber foods include fruits, vegetables, whole-grain breads and cereals, and beans. Your healthcare provider or dietitian can help you create a high-fiber meal plan. Your provider may also recommend a fiber supplement if you cannot get enough fiber from food.  Exercise regularly.  Regular physical activity can help stimulate your intestines. Walking is a good exercise to prevent or relieve constipation. Ask which exercises are best for you.  Schedule a time each day to have a bowel movement.  This may help train your body to have regular bowel movements. Bend forward while you are on the toilet to help move the bowel movement out. Sit on the toilet for at least 10 minutes, even if you do not have a bowel movement.    Store narcotics safely:   Store narcotics where others cannot easily get them.  Keep them in a locked cabinet or secure area. Do not  keep them in a purse or other bag you carry with you. A person may be looking for something else and find the narcotics.  Make sure narcotics are stored out of the reach of children.  A child can easily overdose on narcotics. Narcotics may look like candy to a small child.    The best way to dispose of narcotics:      The laws vary by country and area. In the [place], the best way is to return the narcotics through a take-back program. This program is offered by the [place] Drug Enforcement Agency (RIZWAN). The following are options for using the program:  Take the narcotics to a [place] collection site.  The site is often a law enforcement center. Call your local law enforcement center for scheduled take-back days in your area. You will be given information on where to go if the collection site is in a different location.  Take the narcotics to an approved pharmacy or hospital.  A pharmacy or hospital may be set up as a collection site. You will need to ask if it is a [facility]  collection site if you were not directed there. A pharmacy or doctor's office may not be able to take back narcotics unless it is a R[place]W[employer] site.  Use a mail-back system.  This means you are given containers to put the narcotics into. You will then mail them in the containers.  Use a take-back drop box.  This is a place to leave the narcotics at any time. People and animals will not be able to get into the box. Your local law enforcement agency can tell you where to find a drop box in your area.    Other ways to manage pain:   Ask your healthcare provider about non-narcotic medicines to control pain.  Nonprescription medicines include NSAIDs (such as ibuprofen) and acetaminophen. Prescription medicines include muscle relaxers, antidepressants, and steroids.  Pain may be managed without any medicines.  Some ways to relieve pain include massage, aromatherapy, or meditation. Physical or occupational therapy may also help.    For more information:   Drug Enforcement Administration  [address] [place] [address]  Phone: 8- 216 - 392-1412  Web Address: https://www.deadiversion.[employer].gov/drug_disposal/     Food and Drug Administration  [address] [place] [address]  Phone: 2- 522 - 706-2277  Web Address: http://www.fda.gov     © Copyright [employer] 2018 Information is for End User's use only and may not be sold, redistributed or otherwise used for commercial purposes. All illustrations and images included in CareNotes® are the copyrighted property of [employer] or [employer]      Medicare Preventive Visit Patient Instructions  Thank you for completing your Welcome to Medicare Visit or Medicare Annual Wellness Visit today. Your next wellness visit will be due in one year (12/19/2025).  The screening/preventive services that you may require over the next 5-10 years are detailed below. Some tests may not apply to you based off risk factors and/or age. Screening tests  ordered at today's visit but not completed yet may show as past due. Also, please note that scanned in results may not display below.  Preventive Screenings:  Service Recommendations Previous Testing/Comments   Colorectal Cancer Screening  * Colonoscopy    * Fecal Occult Blood Test (FOBT)/Fecal Immunochemical Test (FIT)  * Fecal DNA/Cologuard Test  * Flexible Sigmoidoscopy Age: 45-75 years old   Colonoscopy: every 10 years (may be performed more frequently if at higher risk)  OR  FOBT/FIT: every 1 year  OR  Cologuard: every 3 years  OR  Sigmoidoscopy: every 5 years  Screening may be recommended earlier than age 45 if at higher risk for colorectal cancer. Also, an individualized decision between you and your healthcare provider will decide whether screening between the ages of 76-85 would be appropriate. Colonoscopy: 09/21/2022  FOBT/FIT: Not on file  Cologuard: Not on file  Sigmoidoscopy: Not on file    Screening Current     Breast Cancer Screening Age: 40+ years old  Frequency: every 1-2 years  Not required if history of left and right mastectomy Mammogram: 09/20/2024    Screening Current   Cervical Cancer Screening Between the ages of 21-29, pap smear recommended once every 3 years.   Between the ages of 30-65, can perform pap smear with HPV co-testing every 5 years.   Recommendations may differ for women with a history of total hysterectomy, cervical cancer, or abnormal pap smears in past. Pap Smear: Not on file    Screening Not Indicated   Hepatitis C Screening Once for adults born between 1945 and 1965  More frequently in patients at high risk for Hepatitis C Hep C Antibody: 05/08/2023    Screening Current   Diabetes Screening 1-2 times per year if you're at risk for diabetes or have pre-diabetes Fasting glucose: 105 mg/dL (10/26/2024)  A1C: 5.6 % (10/26/2024)  Screening Current   Cholesterol Screening Once every 5 years if you don't have a lipid disorder. May order more often based on risk factors. Lipid  panel: 09/18/2024    Screening Not Indicated  History Lipid Disorder     Other Preventive Screenings Covered by Medicare:  Abdominal Aortic Aneurysm (AAA) Screening: covered once if your at risk. You're considered to be at risk if you have a family history of AAA.  Lung Cancer Screening: covers low dose CT scan once per year if you meet all of the following conditions: (1) Age 55-77; (2) No signs or symptoms of lung cancer; (3) Current smoker or have quit smoking within the last 15 years; (4) You have a tobacco smoking history of at least 20 pack years (packs per day multiplied by number of years you smoked); (5) You get a written order from a healthcare provider.  Glaucoma Screening: covered annually if you're considered high risk: (1) You have diabetes OR (2) Family history of glaucoma OR (3)  aged 50 and older OR (4)  American aged 65 and older  Osteoporosis Screening: covered every 2 years if you meet one of the following conditions: (1) You're estrogen deficient and at risk for osteoporosis based off medical history and other findings; (2) Have a vertebral abnormality; (3) On glucocorticoid therapy for more than 3 months; (4) Have primary hyperparathyroidism; (5) On osteoporosis medications and need to assess response to drug therapy.   Last bone density test (DXA Scan): 07/30/2021.  HIV Screening: covered annually if you're between the age of 15-65. Also covered annually if you are younger than 15 and older than 65 with risk factors for HIV infection. For pregnant patients, it is covered up to 3 times per pregnancy.    Immunizations:  Immunization Recommendations   Influenza Vaccine Annual influenza vaccination during flu season is recommended for all persons aged >= 6 months who do not have contraindications   Pneumococcal Vaccine   * Pneumococcal conjugate vaccine = PCV13 (Prevnar 13), PCV15 (Vaxneuvance), PCV20 (Prevnar 20)  * Pneumococcal polysaccharide vaccine = PPSV23 (Pneumovax)  Adults 19-63 yo with certain risk factors or if 65+ yo  If never received any pneumonia vaccine: recommend Prevnar 20 (PCV20)  Give PCV20 if previously received 1 dose of PCV13 or PPSV23   Hepatitis B Vaccine 3 dose series if at intermediate or high risk (ex: diabetes, end stage renal disease, liver disease)   Respiratory syncytial virus (RSV) Vaccine - COVERED BY MEDICARE PART D  * RSVPreF3 (Arexvy) CDC recommends that adults 60 years of age and older may receive a single dose of RSV vaccine using shared clinical decision-making (SCDM)   Tetanus (Td) Vaccine - COST NOT COVERED BY MEDICARE PART B Following completion of primary series, a booster dose should be given every 10 years to maintain immunity against tetanus. Td may also be given as tetanus wound prophylaxis.   Tdap Vaccine - COST NOT COVERED BY MEDICARE PART B Recommended at least once for all adults. For pregnant patients, recommended with each pregnancy.   Shingles Vaccine (Shingrix) - COST NOT COVERED BY MEDICARE PART B  2 shot series recommended in those 19 years and older who have or will have weakened immune systems or those 50 years and older     Health Maintenance Due:      Topic Date Due   • Breast Cancer Screening: Mammogram  09/20/2026   • Colorectal Cancer Screening  09/18/2032   • Hepatitis C Screening  Completed     Immunizations Due:      Topic Date Due   • Influenza Vaccine (1) 09/01/2024   • COVID-19 Vaccine (3 - 2024-25 season) 09/01/2024     Advance Directives   What are advance directives?  Advance directives are legal documents that state your wishes and plans for medical care. These plans are made ahead of time in case you lose your ability to make decisions for yourself. Advance directives can apply to any medical decision, such as the treatments you want, and if you want to donate organs.   What are the types of advance directives?  There are many types of advance directives, and each state has rules about how to use them. You may  choose a combination of any of the following:  Living will:  This is a written record of the treatment you want. You can also choose which treatments you do not want, which to limit, and which to stop at a certain time. This includes surgery, medicine, IV fluid, and tube feedings.   Durable power of  for healthcare (DPAHC):  This is a written record that states who you want to make healthcare choices for you when you are unable to make them for yourself. This person, called a proxy, is usually a family member or a friend. You may choose more than 1 proxy.  Do not resuscitate (DNR) order:  A DNR order is used in case your heart stops beating or you stop breathing. It is a request not to have certain forms of treatment, such as CPR. A DNR order may be included in other types of advance directives.  Medical directive:  This covers the care that you want if you are in a coma, near death, or unable to make decisions for yourself. You can list the treatments you want for each condition. Treatment may include pain medicine, surgery, blood transfusions, dialysis, IV or tube feedings, and a ventilator (breathing machine).  Values history:  This document has questions about your views, beliefs, and how you feel and think about life. This information can help others choose the care that you would choose.  Why are advance directives important?  An advance directive helps you control your care. Although spoken wishes may be used, it is better to have your wishes written down. Spoken wishes can be misunderstood, or not followed. Treatments may be given even if you do not want them. An advance directive may make it easier for your family to make difficult choices about your care.   Weight Management   Why it is important to manage your weight:  Being overweight increases your risk of health conditions such as heart disease, high blood pressure, type 2 diabetes, and certain types of cancer. It can also increase your risk  for osteoarthritis, sleep apnea, and other respiratory problems. Aim for a slow, steady weight loss. Even a small amount of weight loss can lower your risk of health problems.  How to lose weight safely:  A safe and healthy way to lose weight is to eat fewer calories and get regular exercise. You can lose up about 1 pound a week by decreasing the number of calories you eat by 500 calories each day.   Healthy meal plan for weight management:  A healthy meal plan includes a variety of foods, contains fewer calories, and helps you stay healthy. A healthy meal plan includes the following:  Eat whole-grain foods more often.  A healthy meal plan should contain fiber. Fiber is the part of grains, fruits, and vegetables that is not broken down by your body. Whole-grain foods are healthy and provide extra fiber in your diet. Some examples of whole-grain foods are whole-wheat breads and pastas, oatmeal, brown rice, and bulgur.  Eat a variety of vegetables every day.  Include dark, leafy greens such as spinach, kale, morena greens, and mustard greens. Eat yellow and orange vegetables such as carrots, sweet potatoes, and winter squash.   Eat a variety of fruits every day.  Choose fresh or canned fruit (canned in its own juice or light syrup) instead of juice. Fruit juice has very little or no fiber.  Eat low-fat dairy foods.  Drink fat-free (skim) milk or 1% milk. Eat fat-free yogurt and low-fat cottage cheese. Try low-fat cheeses such as mozzarella and other reduced-fat cheeses.  Choose meat and other protein foods that are low in fat.  Choose beans or other legumes such as split peas or lentils. Choose fish, skinless poultry (chicken or turkey), or lean cuts of red meat (beef or pork). Before you cook meat or poultry, cut off any visible fat.   Use less fat and oil.  Try baking foods instead of frying them. Add less fat, such as margarine, sour cream, regular salad dressing and mayonnaise to foods. Eat fewer high-fat foods.  Some examples of high-fat foods include french fries, doughnuts, ice cream, and cakes.  Eat fewer sweets.  Limit foods and drinks that are high in sugar. This includes candy, cookies, regular soda, and sweetened drinks.  Exercise:  Exercise at least 30 minutes per day on most days of the week. Some examples of exercise include walking, biking, dancing, and swimming. You can also fit in more physical activity by taking the stairs instead of the elevator or parking farther away from stores. Ask your healthcare provider about the best exercise plan for you.   Narcotic (Opioid) Safety    Use narcotics safely:  Take prescribed narcotics exactly as directed  Do not give narcotics to others or take narcotics that belong to someone else  Do not mix narcotics without medicines or alcohol  Do not drive or operate heavy machinery after you take the narcotic  Monitor for side effects and notify your healthcare provider if you experienced side effects such as nausea, sleepiness, itching, or trouble thinking clearly.    Manage constipation:    Constipation is the most common side effect of narcotic medicine. Constipation is when you have hard, dry bowel movements, or you go longer than usual between bowel movements. Tell your healthcare provider about all changes in your bowel movements while you are taking narcotics. He or she may recommend laxative medicine to help you have a bowel movement. He or she may also change the kind of narcotic you are taking, or change when you take it. The following are more ways you can prevent or relieve constipation:    Drink liquids as directed.  You may need to drink extra liquids to help soften and move your bowels. Ask how much liquid to drink each day and which liquids are best for you.  Eat high-fiber foods.  This may help decrease constipation by adding bulk to your bowel movements. High-fiber foods include fruits, vegetables, whole-grain breads and cereals, and beans. Your healthcare  provider or dietitian can help you create a high-fiber meal plan. Your provider may also recommend a fiber supplement if you cannot get enough fiber from food.  Exercise regularly.  Regular physical activity can help stimulate your intestines. Walking is a good exercise to prevent or relieve constipation. Ask which exercises are best for you.  Schedule a time each day to have a bowel movement.  This may help train your body to have regular bowel movements. Bend forward while you are on the toilet to help move the bowel movement out. Sit on the toilet for at least 10 minutes, even if you do not have a bowel movement.    Store narcotics safely:   Store narcotics where others cannot easily get them.  Keep them in a locked cabinet or secure area. Do not  keep them in a purse or other bag you carry with you. A person may be looking for something else and find the narcotics.  Make sure narcotics are stored out of the reach of children.  A child can easily overdose on narcotics. Narcotics may look like candy to a small child.    The best way to dispose of narcotics:      The laws vary by country and area. In the [place], the best way is to return the narcotics through a take-back program. This program is offered by the US Drug Enforcement Agency (RIZWAN). The following are options for using the program:  Take the narcotics to a [facility] collection site.  The site is often a law enforcement center. Call your local law enforcement center for scheduled take-back days in your area. You will be given information on where to go if the collection site is in a different location.  Take the narcotics to an approved pharmacy or hospital.  A pharmacy or hospital may be set up as a collection site. You will need to ask if it is a [facility] collection site if you were not directed there. A pharmacy or doctor's office may not be able to take back narcotics unless it is a RIZWAN site.  Use a mail-back system.  This means you are given containers to  put the narcotics into. You will then mail them in the containers.  Use a take-back drop box.  This is a place to leave the narcotics at any time. People and animals will not be able to get into the box. Your local law enforcement agency can tell you where to find a drop box in your area.    Other ways to manage pain:   Ask your healthcare provider about non-narcotic medicines to control pain.  Nonprescription medicines include NSAIDs (such as ibuprofen) and acetaminophen. Prescription medicines include muscle relaxers, antidepressants, and steroids.  Pain may be managed without any medicines.  Some ways to relieve pain include massage, aromatherapy, or meditation. Physical or occupational therapy may also help.    For more information:   Drug Enforcement Administration  [address] [place] [address]  Phone: 6- 526 - 782-8169  Web Address: https://www.deadiversion.[employer].gov/drug_disposal/    [place] Food and Drug Administration  [address] [place] [address]  Phone: 2- 268 - 065-7795  Web Address: http://www.fda.gov     © Copyright [employer] 2018 Information is for End User's use only and may not be sold, redistributed or otherwise used for commercial purposes. All illustrations and images included in CareNotes® are the copyrighted property of [employer] or [employer]      Medicare Preventive Visit Patient Instructions  Thank you for completing your Welcome to Medicare Visit or Medicare Annual Wellness Visit today. Your next wellness visit will be due in one year (12/19/2025).  The screening/preventive services that you may require over the next 5-10 years are detailed below. Some tests may not apply to you based off risk factors and/or age. Screening tests ordered at today's visit but not completed yet may show as past due. Also, please note that scanned in results may not display below.  Preventive Screenings:  Service Recommendations Previous Testing/Comments    Colorectal Cancer Screening  * Colonoscopy    * Fecal Occult Blood Test (FOBT)/Fecal Immunochemical Test (FIT)  * Fecal DNA/Cologuard Test  * Flexible Sigmoidoscopy Age: 45-75 years old   Colonoscopy: every 10 years (may be performed more frequently if at higher risk)  OR  FOBT/FIT: every 1 year  OR  Cologuard: every 3 years  OR  Sigmoidoscopy: every 5 years  Screening may be recommended earlier than age 45 if at higher risk for colorectal cancer. Also, an individualized decision between you and your healthcare provider will decide whether screening between the ages of 76-85 would be appropriate. Colonoscopy: 09/21/2022  FOBT/FIT: Not on file  Cologuard: Not on file  Sigmoidoscopy: Not on file    Screening Current     Breast Cancer Screening Age: 40+ years old  Frequency: every 1-2 years  Not required if history of left and right mastectomy Mammogram: 09/20/2024    Screening Current   Cervical Cancer Screening Between the ages of 21-29, pap smear recommended once every 3 years.   Between the ages of 30-65, can perform pap smear with HPV co-testing every 5 years.   Recommendations may differ for women with a history of total hysterectomy, cervical cancer, or abnormal pap smears in past. Pap Smear: Not on file    Screening Not Indicated   Hepatitis C Screening Once for adults born between 1945 and 1965  More frequently in patients at high risk for Hepatitis C Hep C Antibody: 05/08/2023    Screening Current   Diabetes Screening 1-2 times per year if you're at risk for diabetes or have pre-diabetes Fasting glucose: 105 mg/dL (10/26/2024)  A1C: 5.6 % (10/26/2024)  Screening Current   Cholesterol Screening Once every 5 years if you don't have a lipid disorder. May order more often based on risk factors. Lipid panel: 09/18/2024    Screening Not Indicated  History Lipid Disorder     Other Preventive Screenings Covered by Medicare:  Abdominal Aortic Aneurysm (AAA) Screening: covered once if your at risk. You're considered  to be at risk if you have a family history of AAA.  Lung Cancer Screening: covers low dose CT scan once per year if you meet all of the following conditions: (1) Age 55-77; (2) No signs or symptoms of lung cancer; (3) Current smoker or have quit smoking within the last 15 years; (4) You have a tobacco smoking history of at least 20 pack years (packs per day multiplied by number of years you smoked); (5) You get a written order from a healthcare provider.  Glaucoma Screening: covered annually if you're considered high risk: (1) You have diabetes OR (2) Family history of glaucoma OR (3)  aged 50 and older OR (4)  American aged 65 and older  Osteoporosis Screening: covered every 2 years if you meet one of the following conditions: (1) You're estrogen deficient and at risk for osteoporosis based off medical history and other findings; (2) Have a vertebral abnormality; (3) On glucocorticoid therapy for more than 3 months; (4) Have primary hyperparathyroidism; (5) On osteoporosis medications and need to assess response to drug therapy.   Last bone density test (DXA Scan): 07/30/2021.  HIV Screening: covered annually if you're between the age of 15-65. Also covered annually if you are younger than 15 and older than 65 with risk factors for HIV infection. For pregnant patients, it is covered up to 3 times per pregnancy.    Immunizations:  Immunization Recommendations   Influenza Vaccine Annual influenza vaccination during flu season is recommended for all persons aged >= 6 months who do not have contraindications   Pneumococcal Vaccine   * Pneumococcal conjugate vaccine = PCV13 (Prevnar 13), PCV15 (Vaxneuvance), PCV20 (Prevnar 20)  * Pneumococcal polysaccharide vaccine = PPSV23 (Pneumovax) Adults 19-63 yo with certain risk factors or if 65+ yo  If never received any pneumonia vaccine: recommend Prevnar 20 (PCV20)  Give PCV20 if previously received 1 dose of PCV13 or PPSV23   Hepatitis B Vaccine 3  dose series if at intermediate or high risk (ex: diabetes, end stage renal disease, liver disease)   Respiratory syncytial virus (RSV) Vaccine - COVERED BY MEDICARE PART D  * RSVPreF3 (Arexvy) CDC recommends that adults 60 years of age and older may receive a single dose of RSV vaccine using shared clinical decision-making (SCDM)   Tetanus (Td) Vaccine - COST NOT COVERED BY MEDICARE PART B Following completion of primary series, a booster dose should be given every 10 years to maintain immunity against tetanus. Td may also be given as tetanus wound prophylaxis.   Tdap Vaccine - COST NOT COVERED BY MEDICARE PART B Recommended at least once for all adults. For pregnant patients, recommended with each pregnancy.   Shingles Vaccine (Shingrix) - COST NOT COVERED BY MEDICARE PART B  2 shot series recommended in those 19 years and older who have or will have weakened immune systems or those 50 years and older     Health Maintenance Due:      Topic Date Due   • Breast Cancer Screening: Mammogram  09/20/2026   • Colorectal Cancer Screening  09/18/2032   • Hepatitis C Screening  Completed     Immunizations Due:      Topic Date Due   • Influenza Vaccine (1) 09/01/2024   • COVID-19 Vaccine (3 - 2024-25 season) 09/01/2024     Advance Directives   What are advance directives?  Advance directives are legal documents that state your wishes and plans for medical care. These plans are made ahead of time in case you lose your ability to make decisions for yourself. Advance directives can apply to any medical decision, such as the treatments you want, and if you want to donate organs.   What are the types of advance directives?  There are many types of advance directives, and each state has rules about how to use them. You may choose a combination of any of the following:  Living will:  This is a written record of the treatment you want. You can also choose which treatments you do not want, which to limit, and which to stop at a  certain time. This includes surgery, medicine, IV fluid, and tube feedings.   Durable power of  for healthcare (DPAHC):  This is a written record that states who you want to make healthcare choices for you when you are unable to make them for yourself. This person, called a proxy, is usually a family member or a friend. You may choose more than 1 proxy.  Do not resuscitate (DNR) order:  A DNR order is used in case your heart stops beating or you stop breathing. It is a request not to have certain forms of treatment, such as CPR. A DNR order may be included in other types of advance directives.  Medical directive:  This covers the care that you want if you are in a coma, near death, or unable to make decisions for yourself. You can list the treatments you want for each condition. Treatment may include pain medicine, surgery, blood transfusions, dialysis, IV or tube feedings, and a ventilator (breathing machine).  Values history:  This document has questions about your views, beliefs, and how you feel and think about life. This information can help others choose the care that you would choose.  Why are advance directives important?  An advance directive helps you control your care. Although spoken wishes may be used, it is better to have your wishes written down. Spoken wishes can be misunderstood, or not followed. Treatments may be given even if you do not want them. An advance directive may make it easier for your family to make difficult choices about your care.   Weight Management   Why it is important to manage your weight:  Being overweight increases your risk of health conditions such as heart disease, high blood pressure, type 2 diabetes, and certain types of cancer. It can also increase your risk for osteoarthritis, sleep apnea, and other respiratory problems. Aim for a slow, steady weight loss. Even a small amount of weight loss can lower your risk of health problems.  How to lose weight safely:  A  safe and healthy way to lose weight is to eat fewer calories and get regular exercise. You can lose up about 1 pound a week by decreasing the number of calories you eat by 500 calories each day.   Healthy meal plan for weight management:  A healthy meal plan includes a variety of foods, contains fewer calories, and helps you stay healthy. A healthy meal plan includes the following:  Eat whole-grain foods more often.  A healthy meal plan should contain fiber. Fiber is the part of grains, fruits, and vegetables that is not broken down by your body. Whole-grain foods are healthy and provide extra fiber in your diet. Some examples of whole-grain foods are whole-wheat breads and pastas, oatmeal, brown rice, and bulgur.  Eat a variety of vegetables every day.  Include dark, leafy greens such as spinach, kale, morena greens, and mustard greens. Eat yellow and orange vegetables such as carrots, sweet potatoes, and winter squash.   Eat a variety of fruits every day.  Choose fresh or canned fruit (canned in its own juice or light syrup) instead of juice. Fruit juice has very little or no fiber.  Eat low-fat dairy foods.  Drink fat-free (skim) milk or 1% milk. Eat fat-free yogurt and low-fat cottage cheese. Try low-fat cheeses such as mozzarella and other reduced-fat cheeses.  Choose meat and other protein foods that are low in fat.  Choose beans or other legumes such as split peas or lentils. Choose fish, skinless poultry (chicken or turkey), or lean cuts of red meat (beef or pork). Before you cook meat or poultry, cut off any visible fat.   Use less fat and oil.  Try baking foods instead of frying them. Add less fat, such as margarine, sour cream, regular salad dressing and mayonnaise to foods. Eat fewer high-fat foods. Some examples of high-fat foods include french fries, doughnuts, ice cream, and cakes.  Eat fewer sweets.  Limit foods and drinks that are high in sugar. This includes candy, cookies, regular soda, and  sweetened drinks.  Exercise:  Exercise at least 30 minutes per day on most days of the week. Some examples of exercise include walking, biking, dancing, and swimming. You can also fit in more physical activity by taking the stairs instead of the elevator or parking farther away from stores. Ask your healthcare provider about the best exercise plan for you.   Narcotic (Opioid) Safety    Use narcotics safely:  Take prescribed narcotics exactly as directed  Do not give narcotics to others or take narcotics that belong to someone else  Do not mix narcotics without medicines or alcohol  Do not drive or operate heavy machinery after you take the narcotic  Monitor for side effects and notify your healthcare provider if you experienced side effects such as nausea, sleepiness, itching, or trouble thinking clearly.    Manage constipation:    Constipation is the most common side effect of narcotic medicine. Constipation is when you have hard, dry bowel movements, or you go longer than usual between bowel movements. Tell your healthcare provider about all changes in your bowel movements while you are taking narcotics. He or she may recommend laxative medicine to help you have a bowel movement. He or she may also change the kind of narcotic you are taking, or change when you take it. The following are more ways you can prevent or relieve constipation:    Drink liquids as directed.  You may need to drink extra liquids to help soften and move your bowels. Ask how much liquid to drink each day and which liquids are best for you.  Eat high-fiber foods.  This may help decrease constipation by adding bulk to your bowel movements. High-fiber foods include fruits, vegetables, whole-grain breads and cereals, and beans. Your healthcare provider or dietitian can help you create a high-fiber meal plan. Your provider may also recommend a fiber supplement if you cannot get enough fiber from food.  Exercise regularly.  Regular physical  activity can help stimulate your intestines. Walking is a good exercise to prevent or relieve constipation. Ask which exercises are best for you.  Schedule a time each day to have a bowel movement.  This may help train your body to have regular bowel movements. Bend forward while you are on the toilet to help move the bowel movement out. Sit on the toilet for at least 10 minutes, even if you do not have a bowel movement.    Store narcotics safely:   Store narcotics where others cannot easily get them.  Keep them in a locked cabinet or secure area. Do not  keep them in a purse or other bag you carry with you. A person may be looking for something else and find the narcotics.  Make sure narcotics are stored out of the reach of children.  A child can easily overdose on narcotics. Narcotics may look like candy to a small child.    The best way to dispose of narcotics:      The laws vary by country and area. In the [place], the best way is to return the narcotics through a take-back program. This program is offered by the [place] Drug Enforcement Agency (RIZWAN). The following are options for using the program:  Take the narcotics to a RIZWAN collection site.  The site is often a law enforcement center. Call your local law enforcement center for scheduled take-back days in your area. You will be given information on where to go if the collection site is in a different location.  Take the narcotics to an approved pharmacy or hospital.  A pharmacy or hospital may be set up as a collection site. You will need to ask if it is a RIZWAN collection site if you were not directed there. A pharmacy or doctor's office may not be able to take back narcotics unless it is a RIZWAN site.  Use a mail-back system.  This means you are given containers to put the narcotics into. You will then mail them in the containers.  Use a take-back drop box.  This is a place to leave the narcotics at any time. People and animals will not be able to get into the  box. Your local law enforcement agency can tell you where to find a drop box in your area.    Other ways to manage pain:   Ask your healthcare provider about non-narcotic medicines to control pain.  Nonprescription medicines include NSAIDs (such as ibuprofen) and acetaminophen. Prescription medicines include muscle relaxers, antidepressants, and steroids.  Pain may be managed without any medicines.  Some ways to relieve pain include massage, aromatherapy, or meditation. Physical or occupational therapy may also help.    For more information:   Drug Enforcement Administration  [address] [place] [address]  Phone: 8- 390 - 522-3202  Web Address: https://www.deadiversion.[employer].gov/drug_disposal/    [place] Food and Drug Administration  [address] [place] [address]  Phone: 5- 502 - 635-2257  Web Address: http://www.fda.gov     © Copyright [employer] 2018 Information is for End User's use only and may not be sold, redistributed or otherwise used for commercial purposes. All illustrations and images included in CareNotes® are the copyrighted property of [employer].D.A.M., Inc. or [employer]

## 2024-12-18 NOTE — ASSESSMENT & PLAN NOTE
BP Readings from Last 3 Encounters:   12/18/24 128/84   12/06/24 110/70   11/26/24 124/82     - At goal. Continue Losartan 100 mg & amlodipine 10 mg daily.

## 2024-12-19 ENCOUNTER — TELEPHONE (OUTPATIENT)
Dept: OBGYN CLINIC | Facility: HOSPITAL | Age: 74
End: 2024-12-19

## 2024-12-19 NOTE — TELEPHONE ENCOUNTER
Caller: Patient    Doctor: Ronald    Reason for call: Faxed operative report from surgery on 11/13/24 to [facility] fax# [phone]     Call back#: n/a

## 2024-12-24 ENCOUNTER — APPOINTMENT (OUTPATIENT)
Dept: RADIOLOGY | Age: 74
End: 2024-12-24
Payer: COMMERCIAL

## 2024-12-24 ENCOUNTER — OFFICE VISIT (OUTPATIENT)
Dept: OBGYN CLINIC | Facility: CLINIC | Age: 74
End: 2024-12-24

## 2024-12-24 VITALS — WEIGHT: 159 LBS | HEIGHT: 64 IN | BODY MASS INDEX: 27.14 KG/M2

## 2024-12-24 DIAGNOSIS — Z96.651 AFTERCARE FOLLOWING RIGHT KNEE JOINT REPLACEMENT SURGERY: Primary | ICD-10-CM

## 2024-12-24 DIAGNOSIS — Z47.1 AFTERCARE FOLLOWING RIGHT KNEE JOINT REPLACEMENT SURGERY: Primary | ICD-10-CM

## 2024-12-24 DIAGNOSIS — Z47.1 AFTERCARE FOLLOWING RIGHT KNEE JOINT REPLACEMENT SURGERY: ICD-10-CM

## 2024-12-24 DIAGNOSIS — Z96.651 AFTERCARE FOLLOWING RIGHT KNEE JOINT REPLACEMENT SURGERY: ICD-10-CM

## 2024-12-24 DIAGNOSIS — Z96.651 S/P RIGHT UNICOMPARTMENTAL KNEE REPLACEMENT: ICD-10-CM

## 2024-12-24 PROCEDURE — 99024 POSTOP FOLLOW-UP VISIT: CPT | Performed by: STUDENT IN AN ORGANIZED HEALTH CARE EDUCATION/TRAINING PROGRAM

## 2024-12-24 PROCEDURE — 73562 X-RAY EXAM OF KNEE 3: CPT

## 2024-12-24 NOTE — ASSESSMENT & PLAN NOTE
Patient is 6 weeks s/p right unicompartmental knee arthroplasty  - WBAT    - Tylenol and Celebrex for pain control prn  -Continue to advise formal physical therapy to restore range of motion.  A second prescription was provided.  - May DC ASA for dvt ppx   - May shower and soak/submerge incision  - Begin scar massage   - Compression sock for swelling control prn  - Advised use of sunscreen over incision while in prolonged sunlight  - No antibiotic dental prophylaxis is necessary  - No restrictions from our standpoint. Let pain be a guide  - RTC in 2-3 weeks for ROM check before considering manipulation.  Patient was advised that she should obtain a flexion angle of roughly 120 degrees before her next visit.

## 2024-12-24 NOTE — PROGRESS NOTES
Date: 24  Shraddha Frazier   MRN# [id number]  : 1950      Chief Complaint: Post op right unicompartmental knee arthroplasty    Assessment and Plan:    S/P right unicompartmental knee replacement  Patient is 6 weeks s/p right unicompartmental knee arthroplasty  - WBAT    - Tylenol and Celebrex for pain control prn  -Continue to advise formal physical therapy to restore range of motion.  A second prescription was provided.  - May DC ASA for dvt ppx   - May shower and soak/submerge incision  - Begin scar massage   - Compression sock for swelling control prn  - Advised use of sunscreen over incision while in prolonged sunlight  - No antibiotic dental prophylaxis is necessary  - No restrictions from our standpoint. Let pain be a guide  - RTC in 2-3 weeks for ROM check before considering manipulation.  Patient was advised that she should obtain a flexion angle of roughly 120 degrees before her next visit.       Subjective:   Patient is 6 weeks s/p right unicompartmental knee arthroplasty.  Patient states today that she overall is doing well.  Patient states she continues to take Celebrex, Tylenol, and oxycodone as needed for pain control.  Patient states she has finished taking ASA for DVT prophylaxis.  Patient still ambulates with a walker.  Patient has not yet initiated physical therapy for her right knee.  Patient states her pain is intermittent in nature.  Patient and patient's family are overall pleased with her progress.    Date of procedure: 24  Doing well, no new problems  Pain progressively improving  Improved swelling  Ambulating with walker    Allergy:  Allergies   Allergen Reactions    Aspirin     Penicillins Other (See Comments) and Rash     Medications:  all current active meds have been reviewed    Past Medical History:  Past Medical History:   Diagnosis Date    Chronic cough 2021    Decreased hearing, left 2019    Echocardiogram abnormal 2016    Mild LVH with  normal systolic function EF > 70%. Grade I Diastolic dysfunction. Aortic sclerosis w/o significant stenosis. Mild insufficiency. Mild MR. Mild TR with normal pulmonary artery pressure.     History of mammogram 09/13/2017    Benign    History of mammogram 02/15/2016    Benign    HTN (hypertension)     Hyperlipidemia     Hypertension     Osteopenia determined by x-ray 02/03/2016    osteopenia of the left femoral neck and normal bone marrow density of the left hip total. Osteopenia of the lumbar spine.     Primary osteoarthritis of left knee 06/08/2023    Primary osteoarthritis of right knee 08/07/2020     Past Surgical History:  Past Surgical History:   Procedure Laterality Date    BLADDER SUSPENSION      HYSTERECTOMY      age 45    RI ARTHRP KNEE CONDYLE&PLATEAU MEDIAL/LAT CMPRT Right 11/13/2024    Procedure: ARTHROPLASTY KNEE UNICOMPARTMENTAL;  Surgeon: Danny Cardenas MD;  Location:  MAIN OR;  Service: Orthopedics     Family History:  Family History   Problem Relation Age of Onset    No Known Problems Mother     No Known Problems Father     No Known Problems Sister     No Known Problems Sister     No Known Problems Sister     No Known Problems Sister     No Known Problems Sister     No Known Problems Sister     No Known Problems Sister     No Known Problems Sister     No Known Problems Daughter     No Known Problems Maternal Grandmother     No Known Problems Maternal Grandfather     No Known Problems Paternal Grandmother     No Known Problems Paternal Grandfather     No Known Problems Maternal Aunt     No Known Problems Paternal Aunt     No Known Problems Paternal Aunt     No Known Problems Paternal Aunt     No Known Problems Paternal Aunt     No Known Problems Paternal Aunt      Social History:  Social History     Substance and Sexual Activity   Alcohol Use Not Currently    Alcohol/week: 1.0 standard drink of alcohol    Types: 1 Glasses of wine per week     Social History     Substance and Sexual Activity  "  Drug Use Never     Social History     Tobacco Use   Smoking Status Never    Passive exposure: Never   Smokeless Tobacco Never           Review of Systems:  General- denies fever/chills  HEENT- denies hearing loss or sore throat  Eyes- denies eye pain or visual disturbances, denies red eyes  Respiratory- denies cough or SOB  Cardio- denies chest pain or palpitations  GI- denies abdominal pain  Endocrine- denies urinary frequency  Urinary- denies pain with urination  Musculoskeletal- Negative except noted above  Skin- denies rashes or wounds  Neurological- denies dizziness or headache  Psychiatric- denies anxiety or difficulty concentrating      Objective:   BP Readings from Last 1 Encounters:   12/18/24 128/84      Wt Readings from Last 1 Encounters:   12/24/24 72.1 kg (159 lb)      Pulse Readings from Last 1 Encounters:   12/18/24 75        BMI: Estimated body mass index is 27.29 kg/m² as calculated from the following:    Height as of this encounter: 5' 4\" (1.626 m).    Weight as of this encounter: 72.1 kg (159 lb).      Physical Exam  Ht 5' 4\" (1.626 m)   Wt 72.1 kg (159 lb)   LMP  (LMP Unknown)   BMI 27.29 kg/m²   General/Constitutional: No apparent distress: well-nourished and well developed.  Eyes: normal ocular motion  Cardio: RRR, Normal S1S2, No m/r/g.   Lymphatic: No appreciable lymphadenopathy  Respiratory: Non-labored breathing, CTA b/l no w/c/r  Vascular: No edema, swelling or tenderness, except as noted in detailed exam. Extremities well perfused. No LE edema  Integumentary: No impressive skin lesions present, except as noted in detailed exam.  Neuro: No ataxia or tremors noted  Psych: Normal mood and affect, oriented to person, place and time. Appropriate affect.  Musculoskeletal: Normal, except as noted in detailed exam and in HPI.      right Knee Examination  Incision: clean, dry, and intact  Effusion: moderate  Alignment: normal  AP Stability: stable  Coronal " Stability  Extension:stable  Mid-flexion: stable  90 degrees flexion: stable  Range of motion  Active: 2 to 95    Motor: 5/5 EHL/FHL/TA/GS/QD/HS  Neurovascular exam is intact.   No evidence of calf tightness or posterior cords    Images:    I personally reviewed relevant images in the PACS system and my interpretation is as follows:  X-rays of the right knee reveal a unicompartmental knee arthroplasty in appropriate alignment without evidence of migration, wear or loosening.        Scribe Attestation      I,:  Gil Brandon am acting as a scribe while in the presence of the attending physician.:       I,:  Danny Cardenas MD personally performed the services described in this documentation    as scribed in my presence.:               Danny Cardenas MD  Adult Reconstruction Specialist   [facility]

## 2025-01-04 DIAGNOSIS — E78.2 MIXED HYPERLIPIDEMIA: ICD-10-CM

## 2025-01-06 RX ORDER — ATORVASTATIN CALCIUM 40 MG/1
40 TABLET, FILM COATED ORAL DAILY
Qty: 90 TABLET | Refills: 1 | Status: SHIPPED | OUTPATIENT
Start: 2025-01-06

## 2025-01-07 ENCOUNTER — EVALUATION (OUTPATIENT)
Dept: PHYSICAL THERAPY | Facility: CLINIC | Age: 75
End: 2025-01-07
Payer: COMMERCIAL

## 2025-01-07 DIAGNOSIS — Z47.1 AFTERCARE FOLLOWING RIGHT KNEE JOINT REPLACEMENT SURGERY: ICD-10-CM

## 2025-01-07 DIAGNOSIS — Z96.651 AFTERCARE FOLLOWING RIGHT KNEE JOINT REPLACEMENT SURGERY: ICD-10-CM

## 2025-01-07 DIAGNOSIS — Z96.651 S/P TOTAL KNEE ARTHROPLASTY, RIGHT: Primary | ICD-10-CM

## 2025-01-07 PROCEDURE — 97110 THERAPEUTIC EXERCISES: CPT | Performed by: PHYSICAL THERAPIST

## 2025-01-07 PROCEDURE — 97140 MANUAL THERAPY 1/> REGIONS: CPT | Performed by: PHYSICAL THERAPIST

## 2025-01-07 PROCEDURE — 97162 PT EVAL MOD COMPLEX 30 MIN: CPT | Performed by: PHYSICAL THERAPIST

## 2025-01-07 NOTE — PROGRESS NOTES
PT Evaluation     Today's date: 2025  Patient name: Shraddha Frazier  : 1950  MRN: [id number]  Referring provider: Danny Cardenas MD  Dx:   Encounter Diagnosis     ICD-10-CM    1. S/P total knee arthroplasty, right  Z96.651       2. Aftercare following right knee joint replacement surgery  Z47.1 Ambulatory Referral to Physical Therapy    Z96.651                      Assessment  Impairments: abnormal gait, abnormal muscle firing, abnormal or restricted ROM, abnormal movement, activity intolerance, impaired balance, impaired physical strength, lacks appropriate home exercise program, pain with function, weight-bearing intolerance, poor body mechanics, participation limitations, activity limitations and endurance  Symptom irritability: moderate    Assessment details: Pt is a 74 y.o. year old female presenting to physical therapy for S/P total right knee arthroplasty on 24.  She presents with the following impairments:  limited R knee PROM/AROM, R LE weakness, poor quad activation, hypomobility, and gait abnormality affecting her function with walking, squatting, standing, stair navigation, transfers, working, and bending her knee.  Pt will benefit from skilled physical therapy to address functional limitations noted in evaluation and meet patient goals.    Goals  ST.  Pt will reduce her pain to 0/10.  2.  Pt will demonstrate good quad activation.    LT. Pt will improve R knee flexion to 120 degrees for improved stair navigation.  2. Pt will improve R knee ext to 4+/5 for improved squatting.  3. Pt will meet projected FOTO score.    Plan  Patient would benefit from: PT eval and skilled physical therapy  Planned modality interventions: cryotherapy, electrical stimulation/Russian stimulation, TENS, thermotherapy: hydrocollator packs and unattended electrical stimulation    Planned therapy interventions: abdominal trunk stabilization, joint mobilization, behavior modification, body  mechanics training, manual therapy, neuromuscular re-education, patient/caregiver education, therapeutic activities, strengthening, stretching, therapeutic exercise, flexibility, functional ROM exercises and home exercise program    Frequency: 2x week  Duration in weeks: 6        Subjective Evaluation    History of Present Illness  Date of surgery: 2024  Mechanism of injury: surgery  Mechanism of injury: Pt underwent TKA on 24 and reports she is recovering well with minimal pain.  She has some difficulty walking with a slight limp, but presents w/o AD, and has some difficulty with stairs.  She takes muscle relaxers and medications for her blood pressure, but nothing for pain in the past week.  Her goals for therapy are to improve her walking, and to be able to bend her knee, and to stand up straighter.  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   Pain  Current pain ratin          Objective     Observations     Right Knee   Positive for edema and incision.     Tenderness     Right Knee   Tenderness in the lateral joint line, lateral patella, medial patella and superior patella. No tenderness in the inferior patella and medial joint line.     Active Range of Motion   Left Knee   Flexion: 125 degrees   Extension: 2 degrees     Right Knee   Flexion: 80 degrees with pain  Extension: 10 degrees with pain  Extensor la degrees     Passive Range of Motion   Left Knee   Flexion: 125 degrees   Extension: 0 degrees     Right Knee   Flexion: 95 degrees   Extension: 10 degrees with pain    Mobility   Patellar Mobility:     Right Knee   Hypomobile: medial, lateral, superior and inferior     Strength/Myotome Testing     Left Hip   Planes of Motion   Flexion: 4    Right Hip   Planes of Motion   Flexion: 4-    Left Knee   Flexion: 4  Extension: 4+  Quadriceps contraction: good    Right Knee   Flexion: 4-  Extension: 3+  Quadriceps contraction: poor    Additional Strength Details  Poor  "squatting mechanics with limited depth    Ambulation   Weight-Bearing Status   Weight-Bearing Status (Right): weight-bearing as tolerated    Assistive device used: none    Observational Gait   Gait: antalgic and asymmetric   Decreased walking speed and stride length.              Precautions: 11/13/24 L knee unicompartmental TKA, Somali speaking    Date 1/7            Visit # IE            FOTO IE             Re-eval IE              Manuals 1/7            R knee PROM SF            Quad str SF                                      Neuro Re-Ed             Quad set 5x10\"            CLam             Bridge             TKE                                                    Ther Ex             Bike             Heel Slides 20x5\" strap            Hamstring str             Quad/HF str             SLR 10x            S/l hip ABD 10x            Leg press             Knee ext str             Ther Activity                                       Gait Training                                       Modalities                                            "

## 2025-01-15 ENCOUNTER — TELEPHONE (OUTPATIENT)
Dept: FAMILY MEDICINE CLINIC | Facility: CLINIC | Age: 75
End: 2025-01-15

## 2025-01-15 NOTE — TELEPHONE ENCOUNTER
Received [facility]O request from  [facility] received on 1/15/2025. Request was scanned into chart and faxed to MRO.

## 2025-01-16 ENCOUNTER — OFFICE VISIT (OUTPATIENT)
Dept: OBGYN CLINIC | Facility: CLINIC | Age: 75
End: 2025-01-16

## 2025-01-16 VITALS — HEIGHT: 64 IN | WEIGHT: 162 LBS | BODY MASS INDEX: 27.66 KG/M2

## 2025-01-16 DIAGNOSIS — M24.661 ARTHROFIBROSIS OF KNEE JOINT, RIGHT: Primary | ICD-10-CM

## 2025-01-16 DIAGNOSIS — Z96.651 S/P RIGHT UNICOMPARTMENTAL KNEE REPLACEMENT: ICD-10-CM

## 2025-01-16 PROCEDURE — 99024 POSTOP FOLLOW-UP VISIT: CPT | Performed by: STUDENT IN AN ORGANIZED HEALTH CARE EDUCATION/TRAINING PROGRAM

## 2025-01-16 RX ORDER — SODIUM CHLORIDE, SODIUM LACTATE, POTASSIUM CHLORIDE, CALCIUM CHLORIDE 600; 310; 30; 20 MG/100ML; MG/100ML; MG/100ML; MG/100ML
125 INJECTION, SOLUTION INTRAVENOUS CONTINUOUS
Status: CANCELLED | OUTPATIENT
Start: 2025-01-22

## 2025-01-16 NOTE — PROGRESS NOTES
Date: 25  Shraddha Frazier   MRN# [id number]  : 1950      Chief Complaint: Post op right total knee arthroplasty    Assessment and Plan:    S/P right unicompartmental knee replacement   Patient is 9 weeks s/p right total knee arthroplasty  -Thorough discussion was had with the patient regarding the risks and benefits of continuing with the current plan of care versus proceeding with manipulation under anesthesia.  It was stated that if the patient does not obtain full range of motion by roughly 12 weeks postoperative, she will likely be left with some degree of flexion deficit.  All questions were answered to the patient satisfaction.  Patient wishes to pursue manipulation under anesthesia at this time  - WBAT    - Tylenol and Celebrex for pain control prn  - Advised use of sunscreen over incision while in prolonged sunlight  - No antibiotic dental prophylaxis is necessary  - RTC 2 weeks post CLARK  -Discussed that patient will need to perform physical therapy 5x a week for 2 weeks after CLARK.     Subjective:   Patient is  9 weeks  s/p right total knee arthroplasty. Patient has only attended 1 physical therapy session which was on 25. Patient and her family state that they had trouble setting up physical therapy.     : [id number]    Date of procedure: 24  Doing well, no new problems  Pain progressively improving  Improved swelling  Ambulating with no assistive device    Allergy:  Allergies   Allergen Reactions    Aspirin     Penicillins Other (See Comments) and Rash     Medications:  all current active meds have been reviewed    Past Medical History:  Past Medical History:   Diagnosis Date    Chronic cough 2021    Decreased hearing, left 2019    Echocardiogram abnormal 2016    Mild LVH with normal systolic function EF > 70%. Grade I Diastolic dysfunction. Aortic sclerosis w/o significant stenosis. Mild insufficiency. Mild MR. Mild TR with normal  pulmonary artery pressure.     History of mammogram 09/13/2017    Benign    History of mammogram 02/15/2016    Benign    HTN (hypertension)     Hyperlipidemia     Hypertension     Osteopenia determined by x-ray 02/03/2016    osteopenia of the left femoral neck and normal bone marrow density of the left hip total. Osteopenia of the lumbar spine.     Primary osteoarthritis of left knee 06/08/2023    Primary osteoarthritis of right knee 08/07/2020     Past Surgical History:  Past Surgical History:   Procedure Laterality Date    BLADDER SUSPENSION      HYSTERECTOMY      age 45    [place] ARTHRP KNEE CONDYLE&PLATEAU MEDIAL/LAT CMPRT Right 11/13/2024    Procedure: ARTHROPLASTY KNEE UNICOMPARTMENTAL;  Surgeon: Danny Cardenas MD;  Location:  MAIN OR;  Service: Orthopedics     Family History:  Family History   Problem Relation Age of Onset    No Known Problems Mother     No Known Problems Father     No Known Problems Sister     No Known Problems Sister     No Known Problems Sister     No Known Problems Sister     No Known Problems Sister     No Known Problems Sister     No Known Problems Sister     No Known Problems Sister     No Known Problems Daughter     No Known Problems Maternal Grandmother     No Known Problems Maternal Grandfather     No Known Problems Paternal Grandmother     No Known Problems Paternal Grandfather     No Known Problems Maternal Aunt     No Known Problems Paternal Aunt     No Known Problems Paternal Aunt     No Known Problems Paternal Aunt     No Known Problems Paternal Aunt     No Known Problems Paternal Aunt      Social History:  Social History     Substance and Sexual Activity   Alcohol Use Not Currently    Alcohol/week: 1.0 standard drink of alcohol    Types: 1 Glasses of wine per week     Social History     Substance and Sexual Activity   Drug Use Never     Social History     Tobacco Use   Smoking Status Never    Passive exposure: Never   Smokeless Tobacco Never           Review of  "Systems:  General- denies fever/chills  HEENT- denies hearing loss or sore throat  Eyes- denies eye pain or visual disturbances, denies red eyes  Respiratory- denies cough or SOB  Cardio- denies chest pain or palpitations  GI- denies abdominal pain  Endocrine- denies urinary frequency  Urinary- denies pain with urination  Musculoskeletal- Negative except noted above  Skin- denies rashes or wounds  Neurological- denies dizziness or headache  Psychiatric- denies anxiety or difficulty concentrating      Objective:   BP Readings from Last 1 Encounters:   12/18/24 128/84      Wt Readings from Last 1 Encounters:   01/16/25 73.5 kg (162 lb)      Pulse Readings from Last 1 Encounters:   12/18/24 75        BMI: Estimated body mass index is 27.81 kg/m² as calculated from the following:    Height as of this encounter: 5' 4\" (1.626 m).    Weight as of this encounter: 73.5 kg (162 lb).      Physical Exam  Ht 5' 4\" (1.626 m) Comment: verbal  Wt 73.5 kg (162 lb)   LMP  (LMP Unknown)   BMI 27.81 kg/m²   General/Constitutional: No apparent distress: well-nourished and well developed.  Eyes: normal ocular motion  Cardio: RRR, Normal S1S2, No m/r/g.   Lymphatic: No appreciable lymphadenopathy  Respiratory: Non-labored breathing, CTA b/l no w/c/r  Vascular: No edema, swelling or tenderness, except as noted in detailed exam. Extremities well perfused. No LE edema  Integumentary: No impressive skin lesions present, except as noted in detailed exam.  Neuro: No ataxia or tremors noted  Psych: Normal mood and affect, oriented to person, place and time. Appropriate affect.  Musculoskeletal: Normal, except as noted in detailed exam and in HPI.    Gait and Station:   antalgic    right Knee Examination  Incision: clean, dry, and intact  Effusion: moderate  Alignment: normal  AP Stability: stable  Coronal Stability  Extension:stable  Mid-flexion: stable  90 degrees flexion: stable  Range of motion  Active: 0 to 90    Extensor lag: " Present  Motor: 5/5 EHL/FHL/TA/GS/QD/HS  Neurovascular exam is intact.   No evidence of calf tightness or posterior cords    Images:    Previously obtained radiographs of the right knee reveal a total  knee arthroplasty in appropriate alignment without evidence of migration, wear or loosening.      Danny Cardenas MD  Adult Reconstruction Specialist   [facility]

## 2025-01-16 NOTE — H&P (VIEW-ONLY)
Date: 25  Shraddha Frazier   MRN# [id number]  : 1950      Chief Complaint: Post op right total knee arthroplasty    Assessment and Plan:    S/P right unicompartmental knee replacement   Patient is 9 weeks s/p right total knee arthroplasty  -Thorough discussion was had with the patient regarding the risks and benefits of continuing with the current plan of care versus proceeding with manipulation under anesthesia.  It was stated that if the patient does not obtain full range of motion by roughly 12 weeks postoperative, she will likely be left with some degree of flexion deficit.  All questions were answered to the patient satisfaction.  Patient wishes to pursue manipulation under anesthesia at this time  - WBAT    - Tylenol and Celebrex for pain control prn  - Advised use of sunscreen over incision while in prolonged sunlight  - No antibiotic dental prophylaxis is necessary  - RTC 2 weeks post CLARK  -Discussed that patient will need to perform physical therapy 5x a week for 2 weeks after CLARK.     Subjective:   Patient is  9 weeks  s/p right total knee arthroplasty. Patient has only attended 1 physical therapy session which was on 25. Patient and her family state that they had trouble setting up physical therapy.     : [id number]    Date of procedure: 24  Doing well, no new problems  Pain progressively improving  Improved swelling  Ambulating with no assistive device    Allergy:  Allergies   Allergen Reactions    Aspirin     Penicillins Other (See Comments) and Rash     Medications:  all current active meds have been reviewed    Past Medical History:  Past Medical History:   Diagnosis Date    Chronic cough 2021    Decreased hearing, left 2019    Echocardiogram abnormal 2016    Mild LVH with normal systolic function EF > 70%. Grade I Diastolic dysfunction. Aortic sclerosis w/o significant stenosis. Mild insufficiency. Mild MR. Mild TR with normal  pulmonary artery pressure.     History of mammogram 09/13/2017    Benign    History of mammogram 02/15/2016    Benign    HTN (hypertension)     Hyperlipidemia     Hypertension     Osteopenia determined by x-ray 02/03/2016    osteopenia of the left femoral neck and normal bone marrow density of the left hip total. Osteopenia of the lumbar spine.     Primary osteoarthritis of left knee 06/08/2023    Primary osteoarthritis of right knee 08/07/2020     Past Surgical History:  Past Surgical History:   Procedure Laterality Date    BLADDER SUSPENSION      HYSTERECTOMY      age 45    [place] ARTHRP KNEE CONDYLE&PLATEAU MEDIAL/LAT CMPRT Right 11/13/2024    Procedure: ARTHROPLASTY KNEE UNICOMPARTMENTAL;  Surgeon: Danny Cardenas MD;  Location:  MAIN OR;  Service: Orthopedics     Family History:  Family History   Problem Relation Age of Onset    No Known Problems Mother     No Known Problems Father     No Known Problems Sister     No Known Problems Sister     No Known Problems Sister     No Known Problems Sister     No Known Problems Sister     No Known Problems Sister     No Known Problems Sister     No Known Problems Sister     No Known Problems Daughter     No Known Problems Maternal Grandmother     No Known Problems Maternal Grandfather     No Known Problems Paternal Grandmother     No Known Problems Paternal Grandfather     No Known Problems Maternal Aunt     No Known Problems Paternal Aunt     No Known Problems Paternal Aunt     No Known Problems Paternal Aunt     No Known Problems Paternal Aunt     No Known Problems Paternal Aunt      Social History:  Social History     Substance and Sexual Activity   Alcohol Use Not Currently    Alcohol/week: 1.0 standard drink of alcohol    Types: 1 Glasses of wine per week     Social History     Substance and Sexual Activity   Drug Use Never     Social History     Tobacco Use   Smoking Status Never    Passive exposure: Never   Smokeless Tobacco Never           Review of  "Systems:  General- denies fever/chills  HEENT- denies hearing loss or sore throat  Eyes- denies eye pain or visual disturbances, denies red eyes  Respiratory- denies cough or SOB  Cardio- denies chest pain or palpitations  GI- denies abdominal pain  Endocrine- denies urinary frequency  Urinary- denies pain with urination  Musculoskeletal- Negative except noted above  Skin- denies rashes or wounds  Neurological- denies dizziness or headache  Psychiatric- denies anxiety or difficulty concentrating      Objective:   BP Readings from Last 1 Encounters:   12/18/24 128/84      Wt Readings from Last 1 Encounters:   01/16/25 73.5 kg (162 lb)      Pulse Readings from Last 1 Encounters:   12/18/24 75        BMI: Estimated body mass index is 27.81 kg/m² as calculated from the following:    Height as of this encounter: 5' 4\" (1.626 m).    Weight as of this encounter: 73.5 kg (162 lb).      Physical Exam  Ht 5' 4\" (1.626 m) Comment: verbal  Wt 73.5 kg (162 lb)   LMP  (LMP Unknown)   BMI 27.81 kg/m²   General/Constitutional: No apparent distress: well-nourished and well developed.  Eyes: normal ocular motion  Cardio: RRR, Normal S1S2, No m/r/g.   Lymphatic: No appreciable lymphadenopathy  Respiratory: Non-labored breathing, CTA b/l no w/c/r  Vascular: No edema, swelling or tenderness, except as noted in detailed exam. Extremities well perfused. No LE edema  Integumentary: No impressive skin lesions present, except as noted in detailed exam.  Neuro: No ataxia or tremors noted  Psych: Normal mood and affect, oriented to person, place and time. Appropriate affect.  Musculoskeletal: Normal, except as noted in detailed exam and in HPI.    Gait and Station:   antalgic    right Knee Examination  Incision: clean, dry, and intact  Effusion: moderate  Alignment: normal  AP Stability: stable  Coronal Stability  Extension:stable  Mid-flexion: stable  90 degrees flexion: stable  Range of motion  Active: 0 to 90    Extensor lag: " Present  Motor: 5/5 EHL/FHL/TA/GS/QD/HS  Neurovascular exam is intact.   No evidence of calf tightness or posterior cords    Images:    Previously obtained radiographs of the right knee reveal a total  knee arthroplasty in appropriate alignment without evidence of migration, wear or loosening.      Danny Cardenas MD  Adult Reconstruction Specialist   [facility]

## 2025-01-17 NOTE — ASSESSMENT & PLAN NOTE
Patient is 9 weeks s/p right total knee arthroplasty  -Thorough discussion was had with the patient regarding the risks and benefits of continuing with the current plan of care versus proceeding with manipulation under anesthesia.  It was stated that if the patient does not obtain full range of motion by roughly 12 weeks postoperative, she will likely be left with some degree of flexion deficit.  All questions were answered to the patient satisfaction.  Patient wishes to pursue manipulation under anesthesia at this time  - WBAT    - Tylenol and Celebrex for pain control prn  - Advised use of sunscreen over incision while in prolonged sunlight  - No antibiotic dental prophylaxis is necessary  - RTC 2 weeks post CLARK  -Discussed that patient will need to perform physical therapy 5x a week for 2 weeks after CLARK.

## 2025-01-20 NOTE — PRE-PROCEDURE INSTRUCTIONS
Pre-Surgery Instructions:   Medication Instructions    amLODIPine (NORVASC) 10 mg tablet Take day of surgery.    atorvastatin (LIPITOR) 40 mg tablet Take day of surgery.    Calcium Carb-Cholecalciferol 600-10 MG-MCG TABS Hold until after surgery    cholecalciferol (VITAMIN D3) 1,000 units tablet Hold until after surgery    losartan (COZAAR) 100 MG tablet Hold day of surgery.      Medication instructions for day surgery reviewed. Please use only a sip of water to take your instructed medications. Avoid all over the counter vitamins, supplements and NSAIDS for one week prior to surgery per anesthesia guidelines. Tylenol is ok to take as needed.     You will receive a call one business day prior to surgery with an arrival time and hospital directions. If your surgery is scheduled on a Monday, the hospital will be calling you on the Friday prior to your surgery. If you have not heard from anyone by 8pm, please call the hospital supervisor through the hospital  at [phone]. ([place] [phone] or [place] [phone]).    Do not eat or drink anything after midnight the night before your surgery, including candy, mints, lifesavers, or chewing gum. Do not drink alcohol 24hrs before your surgery. Try not to smoke at least 24hrs before your surgery.       Follow the pre surgery showering instructions as listed in the “My Surgical Experience Booklet” or otherwise provided by your surgeon's office. Do not use a blade to shave the surgical area 1 week before surgery. It is okay to use a clean electric clippers up to 24 hours before surgery. Do not apply any lotions, creams, including makeup, cologne, deodorant, or perfumes after showering on the day of your surgery. Do not use dry shampoo, hair spray, hair gel, or any type of hair products.     No contact lenses, eye make-up, or artificial eyelashes. Remove nail polish, including gel polish, and any artificial, gel, or acrylic nails if possible. Remove all  jewelry including rings and body piercing jewelry.     Wear causal clothing that is easy to take on and off. Consider your type of surgery.    Keep any valuables, jewelry, piercings at home. Please bring any specially ordered equipment (sling, braces) if indicated.    Arrange for a responsible person to drive you to and from the hospital on the day of your surgery. Please confirm the visitor policy for the day of your procedure when you receive your phone call with an arrival time.     Call the surgeon's office with any new illnesses, exposures, or additional questions prior to surgery.    Please reference your “My Surgical Experience Booklet” for additional information to prepare for your upcoming surgery.

## 2025-01-22 ENCOUNTER — HOSPITAL ENCOUNTER (OUTPATIENT)
Age: 75
Setting detail: OUTPATIENT SURGERY
Discharge: HOME | End: 2025-01-22
Attending: STUDENT IN AN ORGANIZED HEALTH CARE EDUCATION/TRAINING PROGRAM | Admitting: STUDENT IN AN ORGANIZED HEALTH CARE EDUCATION/TRAINING PROGRAM
Payer: COMMERCIAL

## 2025-01-22 ENCOUNTER — ANESTHESIA (OUTPATIENT)
Age: 75
End: 2025-01-22
Payer: COMMERCIAL

## 2025-01-22 ENCOUNTER — ANESTHESIA EVENT (OUTPATIENT)
Age: 75
End: 2025-01-22
Payer: COMMERCIAL

## 2025-01-22 VITALS
HEIGHT: 63 IN | HEART RATE: 65 BPM | DIASTOLIC BLOOD PRESSURE: 62 MMHG | RESPIRATION RATE: 19 BRPM | SYSTOLIC BLOOD PRESSURE: 127 MMHG | BODY MASS INDEX: 28.17 KG/M2 | OXYGEN SATURATION: 96 % | WEIGHT: 159 LBS | TEMPERATURE: 97.7 F

## 2025-01-22 DIAGNOSIS — M24.661 ARTHROFIBROSIS OF KNEE JOINT, RIGHT: Primary | ICD-10-CM

## 2025-01-22 PROCEDURE — 27570 FIXATION OF KNEE JOINT: CPT

## 2025-01-22 PROCEDURE — 27570 FIXATION OF KNEE JOINT: CPT | Performed by: STUDENT IN AN ORGANIZED HEALTH CARE EDUCATION/TRAINING PROGRAM

## 2025-01-22 RX ORDER — ONDANSETRON 2 MG/ML
4 INJECTION INTRAMUSCULAR; INTRAVENOUS ONCE AS NEEDED
Status: DISCONTINUED | OUTPATIENT
Start: 2025-01-22 | End: 2025-01-22 | Stop reason: HOSPADM

## 2025-01-22 RX ORDER — ACETAMINOPHEN 325 MG/1
650 TABLET ORAL EVERY 6 HOURS PRN
Status: DISCONTINUED | OUTPATIENT
Start: 2025-01-22 | End: 2025-01-22 | Stop reason: HOSPADM

## 2025-01-22 RX ORDER — LIDOCAINE HYDROCHLORIDE 10 MG/ML
INJECTION, SOLUTION EPIDURAL; INFILTRATION; INTRACAUDAL; PERINEURAL AS NEEDED
Status: DISCONTINUED | OUTPATIENT
Start: 2025-01-22 | End: 2025-01-22

## 2025-01-22 RX ORDER — ACETAMINOPHEN 500 MG
1000 TABLET ORAL EVERY 8 HOURS PRN
Qty: 84 TABLET | Refills: 0 | Status: SHIPPED | OUTPATIENT
Start: 2025-01-22 | End: 2025-02-05

## 2025-01-22 RX ORDER — OXYCODONE HYDROCHLORIDE 5 MG/1
5 TABLET ORAL EVERY 4 HOURS PRN
Qty: 5 TABLET | Refills: 0 | Status: SHIPPED | OUTPATIENT
Start: 2025-01-22

## 2025-01-22 RX ORDER — FENTANYL CITRATE 50 UG/ML
INJECTION, SOLUTION INTRAMUSCULAR; INTRAVENOUS AS NEEDED
Status: DISCONTINUED | OUTPATIENT
Start: 2025-01-22 | End: 2025-01-22

## 2025-01-22 RX ORDER — SODIUM CHLORIDE, SODIUM LACTATE, POTASSIUM CHLORIDE, CALCIUM CHLORIDE 600; 310; 30; 20 MG/100ML; MG/100ML; MG/100ML; MG/100ML
INJECTION, SOLUTION INTRAVENOUS CONTINUOUS PRN
Status: DISCONTINUED | OUTPATIENT
Start: 2025-01-22 | End: 2025-01-22

## 2025-01-22 RX ORDER — ONDANSETRON 2 MG/ML
4 INJECTION INTRAMUSCULAR; INTRAVENOUS EVERY 6 HOURS PRN
Status: DISCONTINUED | OUTPATIENT
Start: 2025-01-22 | End: 2025-01-22 | Stop reason: HOSPADM

## 2025-01-22 RX ORDER — FENTANYL CITRATE/PF 50 MCG/ML
25 SYRINGE (ML) INJECTION
Refills: 0 | Status: DISCONTINUED | OUTPATIENT
Start: 2025-01-22 | End: 2025-01-22 | Stop reason: HOSPADM

## 2025-01-22 RX ORDER — SODIUM CHLORIDE, SODIUM LACTATE, POTASSIUM CHLORIDE, CALCIUM CHLORIDE 600; 310; 30; 20 MG/100ML; MG/100ML; MG/100ML; MG/100ML
75 INJECTION, SOLUTION INTRAVENOUS CONTINUOUS
Status: DISCONTINUED | OUTPATIENT
Start: 2025-01-22 | End: 2025-01-22 | Stop reason: HOSPADM

## 2025-01-22 RX ORDER — SODIUM CHLORIDE, SODIUM LACTATE, POTASSIUM CHLORIDE, CALCIUM CHLORIDE 600; 310; 30; 20 MG/100ML; MG/100ML; MG/100ML; MG/100ML
125 INJECTION, SOLUTION INTRAVENOUS CONTINUOUS
Status: DISCONTINUED | OUTPATIENT
Start: 2025-01-22 | End: 2025-01-22 | Stop reason: HOSPADM

## 2025-01-22 RX ORDER — PROPOFOL 10 MG/ML
INJECTION, EMULSION INTRAVENOUS AS NEEDED
Status: DISCONTINUED | OUTPATIENT
Start: 2025-01-22 | End: 2025-01-22

## 2025-01-22 RX ADMIN — SODIUM CHLORIDE, SODIUM LACTATE, POTASSIUM CHLORIDE, CALCIUM CHLORIDE: 600; 310; 30; 20 INJECTION, SOLUTION INTRAVENOUS at 08:42

## 2025-01-22 RX ADMIN — FENTANYL CITRATE 25 MCG: 50 INJECTION INTRAMUSCULAR; INTRAVENOUS at 09:15

## 2025-01-22 RX ADMIN — LIDOCAINE HYDROCHLORIDE 50 MG: 10 INJECTION, SOLUTION EPIDURAL; INFILTRATION; INTRACAUDAL; PERINEURAL at 08:46

## 2025-01-22 RX ADMIN — PROPOFOL 20 MG: 10 INJECTION, EMULSION INTRAVENOUS at 08:48

## 2025-01-22 RX ADMIN — FENTANYL CITRATE 50 MCG: 50 INJECTION, SOLUTION INTRAMUSCULAR; INTRAVENOUS at 08:46

## 2025-01-22 RX ADMIN — FENTANYL CITRATE 50 MCG: 50 INJECTION, SOLUTION INTRAMUSCULAR; INTRAVENOUS at 08:49

## 2025-01-22 RX ADMIN — PROPOFOL 100 MG: 10 INJECTION, EMULSION INTRAVENOUS at 08:46

## 2025-01-22 RX ADMIN — FENTANYL CITRATE 25 MCG: 50 INJECTION INTRAMUSCULAR; INTRAVENOUS at 09:09

## 2025-01-22 RX ADMIN — SODIUM CHLORIDE, SODIUM LACTATE, POTASSIUM CHLORIDE, AND CALCIUM CHLORIDE 125 ML/HR: .6; .31; .03; .02 INJECTION, SOLUTION INTRAVENOUS at 06:40

## 2025-01-22 NOTE — DISCHARGE INSTR - AVS FIRST PAGE
Discharge Instructions - Orthopedics  Shraddha Frazier 74 y.o. female MRN: [id number]  Unit/Bed#: APU 15    Weight Bearing Status:                                           Weightbearing as tolerated to operative extremity    DVT prophylaxis  None    Pain:  Continue analgesics as directed    Dressing Instructions:   Please keep clean, dry and intact until follow up     Showering Instructions:   NO restrictions    Appt Instructions:   Follow up with Dr. Cardenas in 2 weeks  If you do not have your appointment, please call the clinic at [phone].  Otherwise followup as scheduled     Contact the office sooner if you experience any increased numbness/tingling in the extremities.    Miscellaneous:  - Ice and elevation for pain and swelling control  - Begin PT immediately

## 2025-01-22 NOTE — ANESTHESIA PREPROCEDURE EVALUATION
"Procedure:  MANIPULATION JOINT RIGHT KNEE (Right: Knee)    Relevant Problems   CARDIO   (+) Essential hypertension   (+) Hyperlipidemia   (+) Varicose veins of both lower extremities      Lab Results   Component Value Date    WBC 5.85 10/26/2024    HGB 13.8 10/26/2024    HCT 39.6 10/26/2024    MCV 90 10/26/2024     10/26/2024     Lab Results   Component Value Date    K 3.9 10/26/2024    CO2 30 10/26/2024     10/26/2024    BUN 10 10/26/2024    CREATININE 0.61 10/26/2024     Lab Results   Component Value Date    INR 0.99 10/26/2024    PROTIME 13.4 10/26/2024     Lab Results   Component Value Date    PTT 27 10/26/2024       No results found for: \"GLUCOSE\"    Lab Results   Component Value Date    HGBA1C 5.6 10/26/2024       Type and Screen:  A    Physical Exam    Airway       Dental       Cardiovascular      Pulmonary      Other Findings  post-pubertal.      Anesthesia Plan  ASA Score- 2     Anesthesia Type- IV sedation with anesthesia with ASA Monitors.         Additional Monitors:     Airway Plan:     Comment: Discussed deep sedation with natural airway vs GA w/ LMA as backup .       Plan Factors-Exercise tolerance (METS): >4 METS.    Chart reviewed.   Existing labs reviewed. Patient summary reviewed.                  Induction- intravenous.    Postoperative Plan-         Informed Consent- Anesthetic plan and risks discussed with patient.  I personally reviewed this patient with the CRNA. Discussed and agreed on the Anesthesia Plan with the CRNA..      NPO Status:  Vitals Value Taken Time   Date of last liquid 01/21/25 01/22/25 0622   Time of last liquid 2200 01/22/25 0622   Date of last solid 01/21/25 01/22/25 0622   Time of last solid 1900 01/22/25 0622         "

## 2025-01-22 NOTE — ANESTHESIA POSTPROCEDURE EVALUATION
Post-Op Assessment Note    CV Status:  Stable    Pain management: adequate       Mental Status:  Sleepy   Hydration Status:  Euvolemic   PONV Controlled:  Controlled   Airway Patency:  Patent     Post Op Vitals Reviewed: Yes    No anethesia notable event occurred.    Staff: CRNA           Last Filed PACU Vitals:  Vitals Value Taken Time   Temp 97.7 °F (36.5 °C) 01/22/25 0855   Pulse 65 01/22/25 0855   /64 01/22/25 0855   Resp 16 01/22/25 0855   SpO2 100 % 01/22/25 0855       Modified Erica:     Vitals Value Taken Time   Activity 1 01/22/25 0855   Respiration 2 01/22/25 0855   Circulation 2 01/22/25 0855   Consciousness 0 01/22/25 0855   Oxygen Saturation 1 01/22/25 0855     Modified Erica Score: 6

## 2025-01-22 NOTE — OP NOTE
OPERATIVE REPORT  PATIENT NAME: Shraddha Frazier    :  1950  MRN: [id number]  Pt Location: [facility] OR ROOM 03    SURGERY DATE: 2025    Surgeons and Role:     * Danny Cardenas MD - Primary     * Rock Dixon PA-C - Assisting    Preop Diagnosis:  Arthrofibrosis of knee joint, right [M24.661]    Post-Op Diagnosis Codes:     * Arthrofibrosis of knee joint, right [M24.661]    Procedure(s):  Right - MANIPULATION JOINT RIGHT KNEE    Specimen(s):  * No specimens in log *    Estimated Blood Loss:   Minimal    Drains:  * No LDAs found *    Anesthesia Type:   Choice    Operative Indications:  Arthrofibrosis of knee joint, right [M24.661]    See clinic note for complete list of medications    Operative Findings:  Premanipulation range of motion: 5-100 degrees  Postmanipulation range of motion: 0-140 degrees  Please refer to clinical images in the chart for documentation    Complications:   None    Procedure and Technique:    On the day of the procedure, patient was taken the operating theater where anesthesia was administered.  A timeout was performed to confirm patient, laterality and procedure.  All were in agreement the procedure began.    The knee was taken through a premanipulation range of motion to determine the degree of arthrofibrosis.  Patient was noted to have a roughly 5-100 degree arc of flexion.  A gentle flexion force was then applied to the length of the tibial shaft being sure to decrease any lever arm across the knee.  The knee joint was able to achieve 140 degrees of flexion.  The knee was then brought to full extension with a gentle extension force placed across the knee.  Patient is able to achieve 0 degrees of extension postmanipulation.  Satisfied with the patient's range of motion, anesthesia was discontinued and the patient was taken to the PACU in stable condition.    No qualified resident was available to assist during the case.  Bryson Dixon PA-C was necessary for safe positioning  and clinical image documentation.        SIGNATURE: Danny Cardenas MD  DATE: January 22, 2025  TIME: 6:54 PM

## 2025-01-22 NOTE — INTERVAL H&P NOTE
H&P reviewed. After examining the patient I find no changes in the patients condition since the H&P had been written.    Vitals:    01/22/25 0627   BP: 148/78   Pulse: 73   Resp: 21   Temp: 98 °F (36.7 °C)   SpO2: 98%     Plan for RIGHT knee manipulation under anesthesia

## 2025-01-23 ENCOUNTER — OFFICE VISIT (OUTPATIENT)
Dept: PHYSICAL THERAPY | Facility: CLINIC | Age: 75
End: 2025-01-23
Payer: COMMERCIAL

## 2025-01-23 DIAGNOSIS — Z96.651 AFTERCARE FOLLOWING RIGHT KNEE JOINT REPLACEMENT SURGERY: Primary | ICD-10-CM

## 2025-01-23 DIAGNOSIS — Z47.1 AFTERCARE FOLLOWING RIGHT KNEE JOINT REPLACEMENT SURGERY: Primary | ICD-10-CM

## 2025-01-23 DIAGNOSIS — Z96.651 S/P TOTAL KNEE ARTHROPLASTY, RIGHT: ICD-10-CM

## 2025-01-23 PROCEDURE — 97110 THERAPEUTIC EXERCISES: CPT | Performed by: PHYSICAL THERAPIST

## 2025-01-23 PROCEDURE — 97140 MANUAL THERAPY 1/> REGIONS: CPT | Performed by: PHYSICAL THERAPIST

## 2025-01-23 NOTE — PROGRESS NOTES
"Re-evaluation Note     Today's date: 2025  Patient name: Shraddha Frazier  : 1950  MRN: [id number]  Referring provider: Danny Cardenas MD  Dx:   Encounter Diagnosis     ICD-10-CM    1. Aftercare following right knee joint replacement surgery  Z47.1     Z96.651       2. S/P total knee arthroplasty, right  Z96.651                      Subjective: Pt reports 5/10 pain in her knee today and underwent manipulation yesterday due to limited knee flexion.      Objective:   R knee flexion AROM = 93 degrees (pre session)  R knee flexion PROM = 112 degrees (post manuals)      Assessment:  Pt has pain with PROM into flexion with guarded end feel, but has improved PROM with trial fo quad str and seated LAD w flexion.  She is able to complete full revolutions on bike post session.  Patient demonstrated fatigue post treatment, exhibited good technique with therapeutic exercises, and would benefit from continued PT.      Plan: Continue per plan of care.  Progress treatment as tolerated.       Precautions: 24 L knee unicompartmental TKA, Maldivian speaking    Date            Visit # IE            FOTO IE             Re-eval IE              Manuals            R knee PROM SF SF           Quad str SF SF           Seated flexion w LAD  SF                        Neuro Re-Ed             Quad set 5x10\"            CLam             Bridge             TKE                                                    Ther Ex             Bike  4' retro           Heel Slides 20x5\" strap 20x5\" strap w SB           Hamstring str             Quad/HF str             SLR 10x 2x10           S/l hip ABD 10x            Leg press             Knee ext str             LAQ's  20x3\"                         Ther Activity             STS  15x                        Gait Training                                       Modalities                                            "

## 2025-01-24 ENCOUNTER — OFFICE VISIT (OUTPATIENT)
Dept: PHYSICAL THERAPY | Facility: CLINIC | Age: 75
End: 2025-01-24
Payer: COMMERCIAL

## 2025-01-24 DIAGNOSIS — Z96.651 S/P TOTAL KNEE ARTHROPLASTY, RIGHT: ICD-10-CM

## 2025-01-24 DIAGNOSIS — Z96.651 AFTERCARE FOLLOWING RIGHT KNEE JOINT REPLACEMENT SURGERY: Primary | ICD-10-CM

## 2025-01-24 DIAGNOSIS — Z47.1 AFTERCARE FOLLOWING RIGHT KNEE JOINT REPLACEMENT SURGERY: Primary | ICD-10-CM

## 2025-01-24 PROCEDURE — 97112 NEUROMUSCULAR REEDUCATION: CPT

## 2025-01-24 PROCEDURE — 97110 THERAPEUTIC EXERCISES: CPT

## 2025-01-24 PROCEDURE — 97140 MANUAL THERAPY 1/> REGIONS: CPT

## 2025-01-24 NOTE — PROGRESS NOTES
"Daily Note     Today's date: 2025  Patient name: Shraddha Frazier  : 1950  MRN: [id number]  Referring provider: Danny Cardenas MD  Dx:   Encounter Diagnosis     ICD-10-CM    1. Aftercare following right knee joint replacement surgery  Z47.1     Z96.651       2. S/P total knee arthroplasty, right  Z96.651                      Subjective: Pt presents to PT reporting pain in R knee grading it a 5/10.       Objective: See treatment diary below      Assessment: Pt was able to achieve full revolutions on bike.   Pt demonstrates difficult with SLR secdonary to quad lag; verbal and tactile cuing not effective.  She reports pain with R knee extension. She does demonstrates heel/toe gait.  Patient demonstrated fatigue post treatment, exhibited good technique with therapeutic exercises, and would benefit from continued PT to increase flexibility, strength and function.        Plan: Continue per plan of care.      Precautions: 24 L knee unicompartmental TKA, Japanese speaking    Date           Visit # IE  3          FOTO IE             Re-eval IE              Manuals           R knee PROM SF SF PK          Quad str SF SF PK          Seated flexion w LAD  SF                        Neuro Re-Ed             Quad set 5x10\"  5\" x 20          CLam   3\" x 15          Bridge             TKE                                                    Ther Ex             Bike  4' retro 6' forward/full rev          Heel Slides 20x5\" strap 20x5\" strap w SB 20x5\" strap w SB          Hamstring str             Quad/HF str             SLR 10x 2x10 2 x10          S/l hip ABD 10x            Leg press             Knee ext str             LAQ's  20x3\"  20x3\"                        Ther Activity             STS  15x 20x                        Gait Training                                       Modalities                                              "

## 2025-01-27 ENCOUNTER — OFFICE VISIT (OUTPATIENT)
Dept: PHYSICAL THERAPY | Facility: CLINIC | Age: 75
End: 2025-01-27
Payer: COMMERCIAL

## 2025-01-27 DIAGNOSIS — Z47.1 AFTERCARE FOLLOWING RIGHT KNEE JOINT REPLACEMENT SURGERY: Primary | ICD-10-CM

## 2025-01-27 DIAGNOSIS — Z96.651 S/P TOTAL KNEE ARTHROPLASTY, RIGHT: ICD-10-CM

## 2025-01-27 DIAGNOSIS — Z96.651 AFTERCARE FOLLOWING RIGHT KNEE JOINT REPLACEMENT SURGERY: Primary | ICD-10-CM

## 2025-01-27 PROCEDURE — 97110 THERAPEUTIC EXERCISES: CPT

## 2025-01-27 PROCEDURE — 97112 NEUROMUSCULAR REEDUCATION: CPT

## 2025-01-27 PROCEDURE — 97140 MANUAL THERAPY 1/> REGIONS: CPT

## 2025-01-27 NOTE — PROGRESS NOTES
"Daily Note     Today's date: 2025  Patient name: Shraddha Frazier  : 1950  MRN: [id number]  Referring provider: Danny Cardenas MD  Dx:   Encounter Diagnosis     ICD-10-CM    1. Aftercare following right knee joint replacement surgery  Z47.1     Z96.651       2. S/P total knee arthroplasty, right  Z96.651           Start Time: 1020  Stop Time: 1105  Total time in clinic (min): 45 minutes    Subjective: Pt reports that her knee is \"so-so\" coming into today's session, with no change in symptoms since last session.       Objective: See treatment diary below      Assessment: Pt tolerated treatment well. Pt was able to complete full revolutions on stationary bike during today's session, but pt needed to weight shift and lean onto her left side in order to achieve full revolutions. Continued to focus remainder of session on improving overall knee ROM, both in flexion and extension, as well as improving overall right quad and other associated knee musculature strength and endurance. Pt reported 6/10 pain increased with manual flexion noting increased pain and discomfort. Pt needed both moderate cueing for form corrections and frequent rest breaks due to increased pain and fatigue in her right knee throughout today's session. Once cued she was able to complete all remaining sets and reps with proper form and minimal levels of fatigue post session. Patient exhibited good technique with therapeutic exercises and would benefit from continued PT      Plan: Continue per plan of care.  Progress treatment as tolerated.       Precautions: 24 L knee unicompartmental TKA, Bengali speaking    Date          Visit # IE  3 4         FOTO IE             Re-eval IE              Manuals          R knee PROM SF SF PK PWK         Quad str SF SF PK PWK         Seated flexion w LAD  SF  PWK                      Neuro Re-Ed            Quad set 5x10\"  5\" x 20 20x5\" R       " "  SAQ    20x3\" R          CLam   3\" x 15 20x3\"           Bridge             TKE                                                    Ther Ex   1/24 1/27         Bike  4' retro 6' forward/full rev 6' full rev         Heel Slides 20x5\" strap 20x5\" strap w SB 20x5\" strap w SB 20x5\" strap         Hamstring str             Quad/HF str             SLR 10x 2x10 2 x10 20x3\" R         S/l hip ABD 10x   20x3\" R         Leg press             Knee ext str             LAQ's  20x3\"  20x3\"  20x3\"                       Ther Activity   1/24          STS  15x 20x                        Gait Training   1/24                                    Modalities   1/24                                             "

## 2025-01-28 ENCOUNTER — OFFICE VISIT (OUTPATIENT)
Dept: PHYSICAL THERAPY | Facility: CLINIC | Age: 75
End: 2025-01-28
Payer: COMMERCIAL

## 2025-01-28 DIAGNOSIS — Z96.651 S/P TOTAL KNEE ARTHROPLASTY, RIGHT: ICD-10-CM

## 2025-01-28 DIAGNOSIS — Z96.651 AFTERCARE FOLLOWING RIGHT KNEE JOINT REPLACEMENT SURGERY: Primary | ICD-10-CM

## 2025-01-28 DIAGNOSIS — Z47.1 AFTERCARE FOLLOWING RIGHT KNEE JOINT REPLACEMENT SURGERY: Primary | ICD-10-CM

## 2025-01-28 PROCEDURE — 97112 NEUROMUSCULAR REEDUCATION: CPT | Performed by: PHYSICAL THERAPIST

## 2025-01-28 PROCEDURE — 97110 THERAPEUTIC EXERCISES: CPT | Performed by: PHYSICAL THERAPIST

## 2025-01-28 PROCEDURE — 97140 MANUAL THERAPY 1/> REGIONS: CPT | Performed by: PHYSICAL THERAPIST

## 2025-01-28 NOTE — PROGRESS NOTES
"Daily Note     Today's date: 2025  Patient name: Shraddha Frazier  : 1950  MRN: [id number]  Referring provider: Danny Cardenas MD  Dx:   Encounter Diagnosis     ICD-10-CM    1. Aftercare following right knee joint replacement surgery  Z47.1     Z96.651       2. S/P total knee arthroplasty, right  Z96.651                Subjective: Pt reports that he knee hurts.       Objective: See treatment diary below      Assessment: Tolerated treatment well. Patient demonstrated fatigue post treatment, exhibited good technique with therapeutic exercises, and would benefit from continued PT. She continued to have extension deficits both actively & passively. Unable to obtain TKE during session this visit. Pt continued to be very focused on pain that is continued, educated the patient on post operative pain.       Plan: Continue per plan of care.  Progress treatment as tolerated.       Precautions: 24 L knee unicompartmental TKA, [place]ib[place]ian speaking    Date         Visit # IE  3 4 5        FOTO IE             Re-eval IE              Manuals         R knee PROM SF SF PK PWK KB        Quad str SF SF PK PWK         Seated flexion w LAD  SF  PWK                      Neuro Re-Ed            Quad set 5x10\"  5\" x 20 20x5\" R 20x5\" R        SAQ    20x3\" R  20x3\" R         CLam   3\" x 15 20x3\"   SL 20x3\"        Bridge             TKE                                                    Ther Ex            Bike  4' retro 6' forward/full rev 6' full rev 6' full rev        Heel Slides 20x5\" strap 20x5\" strap w SB 20x5\" strap w SB 20x5\" strap 20x5\" strap        Hamstring str             Quad/HF str             SLR 10x 2x10 2 x10 20x3\" R 20x3\" R        S/l hip ABD 10x   20x3\" R 20x3\" R        Leg press             Knee ext str             LAQ's  20x3\"  20x3\"  20x3\"  20x3\"                     Ther Activity             STS  15x 20x   20x                   "   Gait Training   1/24                                    Modalities   1/24

## 2025-01-29 ENCOUNTER — OFFICE VISIT (OUTPATIENT)
Dept: PHYSICAL THERAPY | Facility: CLINIC | Age: 75
End: 2025-01-29
Payer: COMMERCIAL

## 2025-01-29 DIAGNOSIS — Z96.651 AFTERCARE FOLLOWING RIGHT KNEE JOINT REPLACEMENT SURGERY: Primary | ICD-10-CM

## 2025-01-29 DIAGNOSIS — Z96.651 S/P TOTAL KNEE ARTHROPLASTY, RIGHT: ICD-10-CM

## 2025-01-29 DIAGNOSIS — Z47.1 AFTERCARE FOLLOWING RIGHT KNEE JOINT REPLACEMENT SURGERY: Primary | ICD-10-CM

## 2025-01-29 PROCEDURE — 97140 MANUAL THERAPY 1/> REGIONS: CPT | Performed by: PHYSICAL THERAPIST

## 2025-01-29 PROCEDURE — 97110 THERAPEUTIC EXERCISES: CPT | Performed by: PHYSICAL THERAPIST

## 2025-01-29 NOTE — PROGRESS NOTES
"Daily Note     Today's date: 2025  Patient name: Shraddha Frazier  : 1950  MRN: [id number]  Referring provider: Danny Cardenas MD  Dx:   Encounter Diagnosis     ICD-10-CM    1. Aftercare following right knee joint replacement surgery  Z47.1     Z96.651       2. S/P total knee arthroplasty, right  Z96.651                      Subjective: Pt reports continued R knee pain and little improvement in the past day.      Objective: See treatment diary below      Assessment:  Pt is challenged w addition of leg press, but is able to complete revolutions on recumbent bike with less difficulty.  Pt continues to have limited knee flexion ROM limited by pain.  Patient demonstrated fatigue post treatment, exhibited good technique with therapeutic exercises, and would benefit from continued PT.      Plan: Continue per plan of care.  Progress treatment as tolerated.       Precautions: 24 L knee unicompartmental TKA, Dutch speaking    Date        Visit # IE  3 4 5 6       FOTO IE             Re-eval IE              Manuals        R knee PROM SF SF PK PWK KB SF       Quad str SF SF PK PWK  SF       Seated flexion w LAD  SF  PWK  SF                    Neuro Re-Ed            Quad set 5x10\"  5\" x 20 20x5\" R 20x5\" R        SAQ    20x3\" R  20x3\" R  10x10\"       CLam   3\" x 15 20x3\"   SL 20x3\"        Bridge             TKE                                                    Ther Ex            Bike  4' retro 6' forward/full rev 6' full rev 6' full rev 6'       Heel Slides 20x5\" strap 20x5\" strap w SB 20x5\" strap w SB 20x5\" strap 20x5\" strap 20x5\" strap       Hamstring str             Quad/HF str             SLR 10x 2x10 2 x10 20x3\" R 20x3\" R 20x3\" R       S/l hip ABD 10x   20x3\" R 20x3\" R 20x3\" R       Leg press      30x 85#       Knee ext str             LAQ's  20x3\"  20x3\"  20x3\"  20x3\"                     Ther Activity           "   STS  15x 20x   20x                     Gait Training   1/24                                    Modalities   1/24

## 2025-01-30 ENCOUNTER — OFFICE VISIT (OUTPATIENT)
Dept: PHYSICAL THERAPY | Facility: CLINIC | Age: 75
End: 2025-01-30
Payer: COMMERCIAL

## 2025-01-30 DIAGNOSIS — Z47.1 AFTERCARE FOLLOWING RIGHT KNEE JOINT REPLACEMENT SURGERY: ICD-10-CM

## 2025-01-30 DIAGNOSIS — Z96.651 S/P TOTAL KNEE ARTHROPLASTY, RIGHT: Primary | ICD-10-CM

## 2025-01-30 DIAGNOSIS — Z96.651 AFTERCARE FOLLOWING RIGHT KNEE JOINT REPLACEMENT SURGERY: ICD-10-CM

## 2025-01-30 PROCEDURE — 97140 MANUAL THERAPY 1/> REGIONS: CPT | Performed by: PHYSICAL THERAPIST

## 2025-01-30 PROCEDURE — 97110 THERAPEUTIC EXERCISES: CPT | Performed by: PHYSICAL THERAPIST

## 2025-01-30 NOTE — PROGRESS NOTES
"Daily Note     Today's date: 2025  Patient name: Shraddha Frazier  : 1950  MRN: [id number]  Referring provider: Danny Cardenas MD  Dx:   Encounter Diagnosis     ICD-10-CM    1. S/P total knee arthroplasty, right  Z96.651       2. Aftercare following right knee joint replacement surgery  Z47.1     Z96.651                      Subjective: Pt reports feeling a little better today.      Objective:   L knee flexion ROM = 110 degrees      Assessment:  Pt does well w today's session, but continues to have pain and guarding limiting terminal knee flexion. Patient demonstrated fatigue post treatment, exhibited good technique with therapeutic exercises, and would benefit from continued PT.      Plan: Continue per plan of care.  Progress treament per protocol.      Precautions: 24 L knee unicompartmental TKA, Belizean speaking    Date       Visit # IE 2 3 4 5 6 7      FOTO IE             Re-eval IE              Manuals       R knee PROM SF SF PK PWK KB SF SF      Quad str SF SF PK PWK  SF SF      Seated flexion w LAD  SF  PWK  SF SF                   Neuro Re-Ed            Quad set 5x10\"  5\" x 20 20x5\" R 20x5\" R  10x10\"      SAQ    20x3\" R  20x3\" R  10x10\"       CLam   3\" x 15 20x3\"   SL 20x3\"        Bridge             TKE                                                    Ther Ex            Bike  4' retro 6' forward/full rev 6' full rev 6' full rev 6' 6'      Heel Slides 20x5\" strap 20x5\" strap w SB 20x5\" strap w SB 20x5\" strap 20x5\" strap 20x5\" strap 20x5\" strap      Hamstring str             Quad/HF str             SLR 10x 2x10 2 x10 20x3\" R 20x3\" R 20x3\" R 20x3\" R      S/l hip ABD 10x   20x3\" R 20x3\" R 20x3\" R 20x3\" R      Leg press      30x 85# 30x 85#      Knee ext str             LAQ's  20x3\"  20x3\"  20x3\"  20x3\"                     Ther Activity             STS  15x 20x   20x                     Gait " Training   1/24                                    Modalities   1/24

## 2025-01-31 ENCOUNTER — OFFICE VISIT (OUTPATIENT)
Dept: PHYSICAL THERAPY | Facility: CLINIC | Age: 75
End: 2025-01-31
Payer: COMMERCIAL

## 2025-01-31 DIAGNOSIS — Z96.651 S/P TOTAL KNEE ARTHROPLASTY, RIGHT: Primary | ICD-10-CM

## 2025-01-31 DIAGNOSIS — Z96.651 AFTERCARE FOLLOWING RIGHT KNEE JOINT REPLACEMENT SURGERY: ICD-10-CM

## 2025-01-31 DIAGNOSIS — Z47.1 AFTERCARE FOLLOWING RIGHT KNEE JOINT REPLACEMENT SURGERY: ICD-10-CM

## 2025-01-31 PROCEDURE — 97140 MANUAL THERAPY 1/> REGIONS: CPT | Performed by: PHYSICAL THERAPIST

## 2025-01-31 PROCEDURE — 97110 THERAPEUTIC EXERCISES: CPT | Performed by: PHYSICAL THERAPIST

## 2025-01-31 NOTE — PROGRESS NOTES
"Daily Note     Today's date: 2025  Patient name: Shraddha Frazier  : 1950  MRN: [id number]  Referring provider: Danny Cardenas MD  Dx:   Encounter Diagnosis     ICD-10-CM    1. S/P total knee arthroplasty, right  Z96.651       2. Aftercare following right knee joint replacement surgery  Z47.1     Z96.651                      Subjective: Pt report walking a little better today and has less pain.      Objective:   R knee flexion AAROM = 108 degrees.      Assessment:  Pt continues to have pain and guarding limiting her to about 108 degrees.  She does well w trial of bridges and progression of leg press.  Knee extensor lag still present with SLR. Patient demonstrated fatigue post treatment, exhibited good technique with therapeutic exercises, and would benefit from continued PT.      Plan: Continue per plan of care.  Progress treatment as tolerated.       Precautions: 24 L knee unicompartmental TKA, Georgian speaking    Date      Visit # IE 2 3 4 5 6 7 8     FOTO IE             Re-eval IE              Manuals      R knee PROM SF SF PK PWK KB SF SF SF     Quad str SF SF PK PWK  SF SF SF     Seated flexion w LAD  SF  PWK  SF SF SF                  Neuro Re-Ed            Quad set 5x10\"  5\" x 20 20x5\" R 20x5\" R  10x10\"      SAQ    20x3\" R  20x3\" R  10x10\"       CLam   3\" x 15 20x3\"   SL 20x3\"        Bridge        15x     TKE                                                    Ther Ex            Bike  4' retro 6' forward/full rev 6' full rev 6' full rev 6' 6' 6'     Heel Slides 20x5\" strap 20x5\" strap w SB 20x5\" strap w SB 20x5\" strap 20x5\" strap 20x5\" strap 20x5\" strap 20x5\" strap     Hamstring str             Quad/HF str             SLR 10x 2x10 2 x10 20x3\" R 20x3\" R 20x3\" R 20x3\" R 20x3\" R     S/l hip ABD 10x   20x3\" R 20x3\" R 20x3\" R 20x3\" R 20x3\" R     Leg press      30x 85# 30x 85# 30x 95#     Knee " "ext str             LAQ's  20x3\"  20x3\"  20x3\"  20x3\"                     Ther Activity   1/24          STS  15x 20x   20x                     Gait Training   1/24                                    Modalities   1/24                                                     "

## 2025-02-04 ENCOUNTER — OFFICE VISIT (OUTPATIENT)
Dept: OBGYN CLINIC | Facility: CLINIC | Age: 75
End: 2025-02-04

## 2025-02-04 VITALS — WEIGHT: 159 LBS | HEIGHT: 63 IN | BODY MASS INDEX: 28.17 KG/M2

## 2025-02-04 DIAGNOSIS — Z96.651 S/P RIGHT UNICOMPARTMENTAL KNEE REPLACEMENT: ICD-10-CM

## 2025-02-04 DIAGNOSIS — M24.661 ARTHROFIBROSIS OF KNEE JOINT, RIGHT: Primary | ICD-10-CM

## 2025-02-04 PROCEDURE — 99024 POSTOP FOLLOW-UP VISIT: CPT | Performed by: STUDENT IN AN ORGANIZED HEALTH CARE EDUCATION/TRAINING PROGRAM

## 2025-02-04 NOTE — ASSESSMENT & PLAN NOTE
Patient is 3 months s/p right total knee arthroplasty  - WBAT    - Tylenol and Celebrex for pain control prn  - Continue PT/HEP as directed  - May shower and soak/submerge incision  - No antibiotic dental prophylaxis is necessary  - No restrictions from our standpoint. Let pain be a guide  - Referral to Physiatry was given  - RTC in 9 months. right knee xrays needed

## 2025-02-04 NOTE — PROGRESS NOTES
Date: 25  Shraddha Frazier   MRN# [id number]  : 1950      Chief Complaint: Post op right total knee arthroplasty; Right knee CLARK     Assessment and Plan:    S/P right unicompartmental knee replacement   Patient is 3 months s/p right total knee arthroplasty  - WBAT    - Tylenol and Celebrex for pain control prn  - Continue PT/HEP as directed  - May shower and soak/submerge incision  - No antibiotic dental prophylaxis is necessary  - No restrictions from our standpoint. Let pain be a guide  - Referral to Physiatry was given  - RTC in 9 months. right knee xrays needed       Subjective:   Patient is 2 weeks s/p right  Right knee CLARK R TKA 3 months   she is currently performing physical therapy with mild soreness afterwards.     Ricardo [id number]  Date of procedure: CLARK 25 and R knee TKA 24  Doing well, no new problems  Pain progressively improving  Improved swelling  Ambulating with no assistive device    Allergy:  Allergies   Allergen Reactions    Aspirin     Penicillins Other (See Comments) and Rash     Medications:  all current active meds have been reviewed    Past Medical History:  Past Medical History:   Diagnosis Date    Chronic cough 2021    Decreased hearing, left 2019    Echocardiogram abnormal 2016    Mild LVH with normal systolic function EF > 70%. Grade I Diastolic dysfunction. Aortic sclerosis w/o significant stenosis. Mild insufficiency. Mild MR. Mild TR with normal pulmonary artery pressure.     History of mammogram 2017    Benign    History of mammogram 02/15/2016    Benign    HTN (hypertension)     Hyperlipidemia     Hypertension     Osteopenia determined by x-ray 2016    osteopenia of the left femoral neck and normal bone marrow density of the left hip total. Osteopenia of the lumbar spine.     Primary osteoarthritis of left knee 2023    Primary osteoarthritis of right knee 2020     Past Surgical History:  Past Surgical History:    Procedure Laterality Date    BLADDER SUSPENSION      HYSTERECTOMY      age 45    MA ARTHRP KNEE CONDYLE&PLATEAU MEDIAL/LAT CMPRT Right 11/13/2024    Procedure: ARTHROPLASTY KNEE UNICOMPARTMENTAL;  Surgeon: Danny Cardenas MD;  Location: [facility];  Service: Orthopedics    MA MANIPULATION KNEE JOINT UNDER GENERAL ANESTHESIA Right 1/22/2025    Procedure: MANIPULATION JOINT RIGHT KNEE;  Surgeon: Danny Cardenas MD;  Location: [facility];  Service: Orthopedics     Family History:  Family History   Problem Relation Age of Onset    No Known Problems Mother     No Known Problems Father     No Known Problems Sister     No Known Problems Sister     No Known Problems Sister     No Known Problems Sister     No Known Problems Sister     No Known Problems Sister     No Known Problems Sister     No Known Problems Sister     No Known Problems Daughter     No Known Problems Maternal Grandmother     No Known Problems Maternal Grandfather     No Known Problems Paternal Grandmother     No Known Problems Paternal Grandfather     No Known Problems Maternal Aunt     No Known Problems Paternal Aunt     No Known Problems Paternal Aunt     No Known Problems Paternal Aunt     No Known Problems Paternal Aunt     No Known Problems Paternal Aunt      Social History:  Social History     Substance and Sexual Activity   Alcohol Use Not Currently    Alcohol/week: 1.0 standard drink of alcohol    Types: 1 Glasses of wine per week     Social History     Substance and Sexual Activity   Drug Use Never     Social History     Tobacco Use   Smoking Status Never    Passive exposure: Never   Smokeless Tobacco Never           Review of Systems:  General- denies fever/chills  HEENT- denies hearing loss or sore throat  Eyes- denies eye pain or visual disturbances, denies red eyes  Respiratory- denies cough or SOB  Cardio- denies chest pain or palpitations  GI- denies abdominal pain  Endocrine- denies urinary frequency  Urinary- denies pain with  "urination  Musculoskeletal- Negative except noted above  Skin- denies rashes or wounds  Neurological- denies dizziness or headache  Psychiatric- denies anxiety or difficulty concentrating      Objective:   BP Readings from Last 1 Encounters:   01/22/25 127/62      Wt Readings from Last 1 Encounters:   02/04/25 72.1 kg (159 lb)      Pulse Readings from Last 1 Encounters:   01/22/25 65        BMI: Estimated body mass index is 28.17 kg/m² as calculated from the following:    Height as of this encounter: 5' 3\" (1.6 m).    Weight as of this encounter: 72.1 kg (159 lb).      Physical Exam  Ht 5' 3\" (1.6 m)   Wt 72.1 kg (159 lb)   LMP  (LMP Unknown)   BMI 28.17 kg/m²   General/Constitutional: No apparent distress: well-nourished and well developed.  Eyes: normal ocular motion  Cardio: RRR, Normal S1S2, No m/r/g.   Lymphatic: No appreciable lymphadenopathy  Respiratory: Non-labored breathing, CTA b/l no w/c/r  Vascular: No edema, swelling or tenderness, except as noted in detailed exam. Extremities well perfused. No LE edema  Integumentary: No impressive skin lesions present, except as noted in detailed exam.  Neuro: No ataxia or tremors noted  Psych: Normal mood and affect, oriented to person, place and time. Appropriate affect.  Musculoskeletal: Normal, except as noted in detailed exam and in HPI.    Gait and Station:   antalgic    right Knee Examination  Incision: well healed   Effusion: mild  Alignment: Neutral  AP Stability: stable  Coronal Stability  Extension:stable  Mid-flexion: stable  90 degrees flexion: stable  Range of motion  Active: 0 to 105    Extensor lag: Absent  Motor: 5/5 EHL/FHL/TA/GS/QD/HS  Neurovascular exam is intact.   No evidence of calf tightness or posterior cords    Images:  Previously obtained radiographs of the right knee reveal a total  knee arthroplasty in appropriate alignment without evidence of migration, wear or loosening.         Scribe Attestation      I,:  Ajay Thrasher am acting as a " scribe while in the presence of the attending physician.:       I,:  Danny Cardenas MD personally performed the services described in this documentation    as scribed in my presence.:               Danny Cardenas MD  Adult Reconstruction Specialist   [facility]

## 2025-02-05 ENCOUNTER — RA CDI HCC (OUTPATIENT)
Dept: OTHER | Facility: HOSPITAL | Age: 75
End: 2025-02-05

## 2025-02-10 ENCOUNTER — OFFICE VISIT (OUTPATIENT)
Dept: FAMILY MEDICINE CLINIC | Facility: CLINIC | Age: 75
End: 2025-02-10

## 2025-02-10 VITALS
TEMPERATURE: 97.2 F | SYSTOLIC BLOOD PRESSURE: 128 MMHG | WEIGHT: 162 LBS | HEART RATE: 75 BPM | RESPIRATION RATE: 14 BRPM | OXYGEN SATURATION: 99 % | DIASTOLIC BLOOD PRESSURE: 66 MMHG | BODY MASS INDEX: 28.7 KG/M2 | HEIGHT: 63 IN

## 2025-02-10 DIAGNOSIS — G89.29 CHRONIC PAIN OF LEFT KNEE: Primary | ICD-10-CM

## 2025-02-10 DIAGNOSIS — M25.562 CHRONIC PAIN OF LEFT KNEE: Primary | ICD-10-CM

## 2025-02-10 PROCEDURE — G2211 COMPLEX E/M VISIT ADD ON: HCPCS

## 2025-02-10 PROCEDURE — 99213 OFFICE O/P EST LOW 20 MIN: CPT

## 2025-02-10 RX ORDER — ACETAMINOPHEN 500 MG
500 TABLET ORAL EVERY 6 HOURS PRN
COMMUNITY

## 2025-02-10 NOTE — PROGRESS NOTES
Name: Shraddha Frazier      : 1950      MRN: [id number]  Encounter Provider: MINDY Teague  Encounter Date: 2/10/2025   Encounter department: [facility]  :  Assessment & Plan  Chronic pain of left knee  Work note provided. Will complete FMLA once received.            BMI Counseling: Body mass index is 28.7 kg/m². The BMI is above normal. Nutrition recommendations include decreasing portion sizes, encouraging healthy choices of fruits and vegetables, decreasing fast food intake, consuming healthier snacks, limiting drinks that contain sugar, moderation in carbohydrate intake, increasing intake of lean protein, reducing intake of saturated and trans fat and reducing intake of cholesterol. Exercise recommendations include moderate physical activity 150 minutes/week. No pharmacotherapy was ordered. Rationale for BMI follow-up plan is due to patient being overweight or obese.       History of Present Illness     This is a 74 year old female presented status post RTKR, patient reports her return to work is on  and does not feel physically ready for work. Managing pain with tylenol, especially at night her pain level is 6/10. She is not using any adaptive equipment, per surgeon recommendation to strengthen her leg. She is requesting FMLA for an additional 6 weeks off.       Review of Systems   Constitutional: Negative.  Negative for chills and fever.   HENT:  Negative for ear pain and sore throat.    Eyes:  Negative for pain and visual disturbance.   Respiratory:  Negative for cough and shortness of breath.    Cardiovascular:  Negative for chest pain and palpitations.   Gastrointestinal:  Negative for abdominal pain and vomiting.   Genitourinary:  Negative for dysuria and hematuria.   Musculoskeletal:  Negative for arthralgias and back pain.   Skin:  Negative for color change and rash.   Neurological:  Negative for seizures and syncope.   All other  "systems reviewed and are negative.      Objective   /66 (BP Location: Left arm, Patient Position: Sitting, Cuff Size: Standard)   Pulse 75   Temp (!) 97.2 °F (36.2 °C) (Temporal)   Resp 14   Ht 5' 3\" (1.6 m)   Wt 73.5 kg (162 lb)   LMP  (LMP Unknown)   SpO2 99%   BMI 28.70 kg/m²      Physical Exam  Vitals and nursing note reviewed.   Constitutional:       General: She is not in acute distress.     Appearance: Normal appearance. She is well-developed.   HENT:      Head: Normocephalic and atraumatic.      Right Ear: External ear normal.      Left Ear: External ear normal.      Nose: Nose normal.   Eyes:      Conjunctiva/sclera: Conjunctivae normal.   Cardiovascular:      Rate and Rhythm: Normal rate and regular rhythm.      Pulses: Normal pulses.      Heart sounds: Normal heart sounds. No murmur heard.  Pulmonary:      Effort: Pulmonary effort is normal. No respiratory distress.      Breath sounds: Normal breath sounds.   Abdominal:      Palpations: Abdomen is soft.      Tenderness: There is no abdominal tenderness.   Musculoskeletal:      Cervical back: Normal range of motion and neck supple.      Right knee: Swelling present. Decreased range of motion. Tenderness present.      Left knee: Normal.   Skin:     General: Skin is warm and dry.   Neurological:      Mental Status: She is alert and oriented to person, place, and time. Mental status is at baseline.   Psychiatric:         Mood and Affect: Mood normal.         Behavior: Behavior normal. Behavior is cooperative.         Thought Content: Thought content normal.         Judgment: Judgment normal.         "

## 2025-02-10 NOTE — LETTER
February 10, 2025     Patient: Shraddha Frazier  YOB: 1950  Date of Visit: 2/10/2025      To Whom it May Concern:    Shraddha Frazier is under my professional care. Shraddha was seen in my office on 2/10/2025. Shraddha may return to work on 3/24/25 .    If you have any questions or concerns, please don't hesitate to call.         Sincerely,          MINDY Teague        CC: No Recipients

## 2025-02-21 ENCOUNTER — TELEPHONE (OUTPATIENT)
Dept: FAMILY MEDICINE CLINIC | Facility: CLINIC | Age: 75
End: 2025-02-21

## 2025-02-21 NOTE — TELEPHONE ENCOUNTER
[employer] form received on 12/21/25  to be completed by PCP. Copy made and placed in PCP folder.    Forms to be delivered to PCP mailbox at assigned time.    AYLEEN

## 2025-02-25 NOTE — TELEPHONE ENCOUNTER
Patient notified form is ready for . Form faxed to [phone], confirmation received, Form scanned into patient chart  Form placed in  bin

## 2025-03-03 NOTE — ANESTHESIA POSTPROCEDURE EVALUATION
Post-Op Assessment Note    CV Status:  Stable    Pain management: adequate       Mental Status:  Alert and awake   Hydration Status:  Euvolemic   PONV Controlled:  Controlled   Airway Patency:  Patent     Post Op Vitals Reviewed: Yes    No anethesia notable event occurred.    Staff: Anesthesiologist           Last Filed PACU Vitals:  Vitals Value Taken Time   Temp 97.7 °F (36.5 °C) 01/22/25 0855   Pulse 63 01/22/25 0925   /62 01/22/25 0915   Resp 22 01/22/25 0925   SpO2 96 % 01/22/25 0925       Modified Erica:     Vitals Value Taken Time   Activity 2 01/22/25 0925   Respiration 2 01/22/25 0925   Circulation 2 01/22/25 0925   Consciousness 2 01/22/25 0925   Oxygen Saturation 2 01/22/25 0925     Modified Erica Score: 10

## 2025-03-24 NOTE — PROGRESS NOTES
Name: Shraddha Frazier      : 1950      MRN: [id number]  Encounter Provider: MINDY Teague  Encounter Date: 3/25/2025   Encounter department: [facility]  :  Assessment & Plan  S/P right unicompartmental knee replacement         Chronic pain of left knee  -Use Tylenol XR prn + Voltaren gel  -May use voltaren gel daily if prn is ineffective.   -Encouraged Heat/Ice application  -Discussed regular exercise for skeletal strengthening.  -Encouraged using a cane on stronger side to take stress off joints  -Encouraged using a tibiofemoral knee brace.  -Work note provided.  -RTC if pain becomes intolerable at work.               History of Present Illness   Shraddah Frazier is a 74 y.o. female  has a past medical history of Chronic cough, Decreased hearing, left, Echocardiogram abnormal, History of mammogram, History of mammogram, HTN (hypertension), Hyperlipidemia, Hypertension, Osteopenia determined by x-ray, Primary osteoarthritis of left knee, and Primary osteoarthritis of right knee.  has a past surgical history that includes Hysterectomy; Bladder suspension; pr arthrp knee condyle&plateau medial/lat cmprt (Right, 2024); and pr manipulation knee joint under general anesthesia (Right, 2025).    Here today for a follow-up of right knee pain (s/p right unicompartmental knee replacement) & FMLA. She has been on continuous leave from work since the Fall for about 5 months. She is suppose to return to work tomorrow and she feels ready. Although, her daughter expresses hesitation because Sylvia experiences pain after standing for five hours. She takes tylenol & diclofenac prn with relief. She was unable to complete PT due to costs. She is requesting a note to work first shift because pain is worse in the afternoon and evening.       Review of Systems   Constitutional:  Negative for chills and fever.   HENT:  Negative for ear pain and sore throat.   "  Eyes:  Negative for pain and visual disturbance.   Respiratory:  Negative for cough and shortness of breath.    Cardiovascular:  Negative for chest pain and palpitations.   Gastrointestinal:  Negative for abdominal pain and vomiting.   Genitourinary:  Negative for dysuria and hematuria.   Musculoskeletal:  Negative for arthralgias and back pain.   Skin:  Negative for color change and rash.   Neurological:  Negative for seizures and syncope.   All other systems reviewed and are negative.      Objective   /68 (BP Location: Left arm, Patient Position: Sitting, Cuff Size: Large)   Pulse 81   Temp 98.4 °F (36.9 °C) (Temporal)   Resp 18   Ht 5' 3\" (1.6 m)   Wt 74.4 kg (164 lb)   LMP  (LMP Unknown)   SpO2 98%   Breastfeeding No   BMI 29.05 kg/m²      Physical Exam  Vitals and nursing note reviewed.   Constitutional:       General: She is not in acute distress.     Appearance: Normal appearance. She is well-developed.   HENT:      Head: Normocephalic and atraumatic.      Right Ear: External ear normal.      Left Ear: External ear normal.      Nose: Nose normal.   Eyes:      Conjunctiva/sclera: Conjunctivae normal.   Cardiovascular:      Rate and Rhythm: Normal rate and regular rhythm.      Heart sounds: No murmur heard.  Pulmonary:      Effort: Pulmonary effort is normal.   Musculoskeletal:         General: No swelling.      Cervical back: Normal range of motion and neck supple.      Right knee: Swelling present. Decreased range of motion. Tenderness present.      Left knee: Normal.   Skin:     General: Skin is warm and dry.      Capillary Refill: Capillary refill takes less than 2 seconds.   Neurological:      General: No focal deficit present.      Mental Status: She is alert and oriented to person, place, and time. Mental status is at baseline.   Psychiatric:         Mood and Affect: Mood normal.         Behavior: Behavior normal.         Thought Content: Thought content normal.         Judgment: " Judgment normal.

## 2025-03-25 ENCOUNTER — OFFICE VISIT (OUTPATIENT)
Dept: FAMILY MEDICINE CLINIC | Facility: CLINIC | Age: 75
End: 2025-03-25

## 2025-03-25 VITALS
DIASTOLIC BLOOD PRESSURE: 68 MMHG | OXYGEN SATURATION: 98 % | TEMPERATURE: 98.4 F | BODY MASS INDEX: 29.06 KG/M2 | HEART RATE: 81 BPM | WEIGHT: 164 LBS | SYSTOLIC BLOOD PRESSURE: 144 MMHG | RESPIRATION RATE: 18 BRPM | HEIGHT: 63 IN

## 2025-03-25 DIAGNOSIS — G89.29 CHRONIC PAIN OF LEFT KNEE: Primary | ICD-10-CM

## 2025-03-25 DIAGNOSIS — M25.562 CHRONIC PAIN OF LEFT KNEE: Primary | ICD-10-CM

## 2025-03-25 DIAGNOSIS — Z96.651 S/P RIGHT UNICOMPARTMENTAL KNEE REPLACEMENT: ICD-10-CM

## 2025-03-25 PROCEDURE — G2211 COMPLEX E/M VISIT ADD ON: HCPCS

## 2025-03-25 PROCEDURE — 99213 OFFICE O/P EST LOW 20 MIN: CPT

## 2025-03-25 NOTE — ASSESSMENT & PLAN NOTE
-Use Tylenol XR prn + Voltaren gel  -May use voltaren gel daily if prn is ineffective.   -Encouraged Heat/Ice application  -Discussed regular exercise for skeletal strengthening.  -Encouraged using a cane on stronger side to take stress off joints  -Encouraged using a tibiofemoral knee brace.  -Work note provided.  -RTC if pain becomes intolerable at work.

## 2025-03-25 NOTE — LETTER
March 25, 2025     Patient: Shraddha Frazier  YOB: 1950  Date of Visit: 3/25/2025      To Whom it May Concern:    Shraddha Frazier is under my professional care. Shraddha was seen in my office on 3/25/2025. Shraddha must work first shift due to her knee pain. The pain is significantly worse and severe in the evening.     If you have any questions or concerns, please don't hesitate to call.         Sincerely,          MINDY Teague        CC: No Recipients

## 2025-04-10 ENCOUNTER — RA CDI HCC (OUTPATIENT)
Dept: OTHER | Facility: HOSPITAL | Age: 75
End: 2025-04-10

## 2025-04-17 ENCOUNTER — OFFICE VISIT (OUTPATIENT)
Dept: FAMILY MEDICINE CLINIC | Facility: CLINIC | Age: 75
End: 2025-04-17

## 2025-04-17 VITALS
HEART RATE: 70 BPM | RESPIRATION RATE: 16 BRPM | DIASTOLIC BLOOD PRESSURE: 68 MMHG | TEMPERATURE: 98 F | OXYGEN SATURATION: 98 % | BODY MASS INDEX: 32.2 KG/M2 | HEIGHT: 60 IN | SYSTOLIC BLOOD PRESSURE: 136 MMHG | WEIGHT: 164 LBS

## 2025-04-17 DIAGNOSIS — E78.2 MIXED HYPERLIPIDEMIA: ICD-10-CM

## 2025-04-17 DIAGNOSIS — M25.562 CHRONIC PAIN OF LEFT KNEE: Primary | ICD-10-CM

## 2025-04-17 DIAGNOSIS — G89.29 CHRONIC PAIN OF LEFT KNEE: Primary | ICD-10-CM

## 2025-04-17 DIAGNOSIS — I10 ESSENTIAL HYPERTENSION: ICD-10-CM

## 2025-04-17 PROCEDURE — 99214 OFFICE O/P EST MOD 30 MIN: CPT

## 2025-04-17 PROCEDURE — G2211 COMPLEX E/M VISIT ADD ON: HCPCS

## 2025-04-17 RX ORDER — BLOOD PRESSURE TEST KIT
KIT MISCELLANEOUS DAILY
Qty: 1 KIT | Refills: 0 | Status: SHIPPED | OUTPATIENT
Start: 2025-04-17

## 2025-04-17 NOTE — PROGRESS NOTES
Name: Shraddha Frazier      : 1950      MRN: [id number]  Encounter Provider: MINDY Teague  Encounter Date: 2025   Encounter department: [facility]  :  Assessment & Plan  Chronic pain of left knee  Currently working. She reports that she is using a knee brace, diclofenac 50 mg PO prn (usually 2 times a week) and diclofenac gel BID daily. This is helping her get through her 8 hour work day.  Orders:    diclofenac sodium (VOLTAREN) 50 mg EC tablet; Take 1 tablet (50 mg total) by mouth 2 (two) times a day as needed (knee pain)    Essential hypertension  BP Readings from Last 3 Encounters:   25 136/68   25 144/68   02/10/25 128/66     At goal. Continue Losartan 40 mg & amlodipine 10 mg daily.   Orders:    Blood Pressure KIT; Use in the morning    Mixed hyperlipidemia  Lab Results   Component Value Date    CHOLESTEROL 149 2024    CHOLESTEROL 145 2023    CHOLESTEROL 181 10/26/2022     Lab Results   Component Value Date    HDL 48 (L) 2024    HDL 56 2023    HDL 44 (L) 10/26/2022     Lab Results   Component Value Date    TRIG 121 2024    TRIG 88 2023    TRIG 132 10/26/2022     Lab Results   Component Value Date    NONHDLC 101 2024    NONHDLC 137 10/26/2022    NONHDLC 96 04/15/2019     Lab Results   Component Value Date    LDLCALC 77 2024   Continue Lipitor 40 mg daily                  History of Present Illness   Shraddha Frazier is a 74 y.o. female  has a past medical history of Chronic cough, Decreased hearing, left, Echocardiogram abnormal, History of mammogram, History of mammogram, HTN (hypertension), Hyperlipidemia, Hypertension, Osteopenia determined by x-ray, Primary osteoarthritis of left knee, and Primary osteoarthritis of right knee.  has a past surgical history that includes Hysterectomy; Bladder suspension; pr arthrp knee condyle&plateau medial/lat cmprt (Right, 2024); and pr  manipulation knee joint under general anesthesia (Right, 1/22/2025).    Patient is a 74 year old female presenting for HTN follow up. Patient presenting with improved blood pressure. Patient is not currently taking her blood pressure, she has no machine and would like a prescribe a machine.         Review of Systems   Constitutional:  Negative for chills and fever.   HENT:  Negative for ear pain and sore throat.    Eyes:  Negative for pain and visual disturbance.   Respiratory:  Negative for cough and shortness of breath.    Cardiovascular:  Negative for chest pain and palpitations.   Gastrointestinal:  Negative for abdominal pain and vomiting.   Genitourinary:  Negative for dysuria and hematuria.   Musculoskeletal:  Positive for arthralgias and myalgias. Negative for back pain.   Skin:  Negative for color change and rash.   Neurological:  Negative for seizures and syncope.   All other systems reviewed and are negative.      Objective   /68 (BP Location: Left arm, Patient Position: Sitting, Cuff Size: Standard)   Pulse 70   Temp 98 °F (36.7 °C) (Temporal)   Resp 16   Ht 5' (1.524 m)   Wt 74.4 kg (164 lb)   LMP  (LMP Unknown)   SpO2 98%   BMI 32.03 kg/m²      Physical Exam  Vitals and nursing note reviewed.   Constitutional:       General: She is not in acute distress.     Appearance: Normal appearance. She is well-developed. She is obese.   HENT:      Head: Normocephalic and atraumatic.      Right Ear: External ear normal.      Left Ear: External ear normal.      Nose: Nose normal.   Eyes:      Conjunctiva/sclera: Conjunctivae normal.   Cardiovascular:      Rate and Rhythm: Normal rate and regular rhythm.      Pulses: Normal pulses.      Heart sounds: Normal heart sounds. No murmur heard.  Pulmonary:      Effort: Pulmonary effort is normal. No respiratory distress.      Breath sounds: Normal breath sounds.   Abdominal:      Palpations: Abdomen is soft.      Tenderness: There is no abdominal tenderness.    Musculoskeletal:         General: No swelling. Normal range of motion.      Cervical back: Normal range of motion and neck supple.   Skin:     General: Skin is warm and dry.      Capillary Refill: Capillary refill takes less than 2 seconds.   Neurological:      General: No focal deficit present.      Mental Status: She is alert and oriented to person, place, and time. Mental status is at baseline.   Psychiatric:         Mood and Affect: Mood normal.         Behavior: Behavior normal.         Thought Content: Thought content normal.         Judgment: Judgment normal.

## 2025-04-17 NOTE — ASSESSMENT & PLAN NOTE
BP Readings from Last 3 Encounters:   04/17/25 136/68   03/25/25 144/68   02/10/25 128/66     At goal. Continue Losartan 40 mg & amlodipine 10 mg daily.   Orders:    Blood Pressure KIT; Use in the morning

## 2025-04-17 NOTE — ASSESSMENT & PLAN NOTE
Currently working. She reports that she is using a knee brace, diclofenac 50 mg PO prn (usually 2 times a week) and diclofenac gel BID daily. This is helping her get through her 8 hour work day.  Orders:    diclofenac sodium (VOLTAREN) 50 mg EC tablet; Take 1 tablet (50 mg total) by mouth 2 (two) times a day as needed (knee pain)

## 2025-04-21 ENCOUNTER — EVALUATION (OUTPATIENT)
Dept: PHYSICAL THERAPY | Facility: CLINIC | Age: 75
End: 2025-04-21
Payer: COMMERCIAL

## 2025-04-21 DIAGNOSIS — Z96.651 STATUS POST TOTAL RIGHT KNEE REPLACEMENT: ICD-10-CM

## 2025-04-21 PROCEDURE — 97110 THERAPEUTIC EXERCISES: CPT

## 2025-04-21 PROCEDURE — 97161 PT EVAL LOW COMPLEX 20 MIN: CPT

## 2025-04-21 NOTE — PROGRESS NOTES
PT Evaluation     Today's date: 2025  Patient name: Shraddha Frazier  : 1950  MRN: [id number]  Referring provider: Danny Cardenas MD  Dx:   Encounter Diagnosis     ICD-10-CM    1. Status post total right knee replacement  Z96.651 Ambulatory Referral to Physical Therapy          Start Time: 1105  Stop Time: 1150  Total time in clinic (min): 45 minutes    Assessment  Impairments: abnormal muscle firing, abnormal or restricted ROM, activity intolerance, impaired physical strength, lacks appropriate home exercise program, pain with function and poor body mechanics  Symptom irritability: low    Assessment details: Pt is a 74 y.o. year old female presenting to physical therapy for Status post total right knee replacement.  She presents with the following impairments: moderately decreased R knee AROM, fair b/l quad activation, slightly decreased BLE strength with increased deficits noted on the LLE compared to RLE, poor squat mechanics, decreased SLS balance ability on each side, and slight swelling of R knee affecting her function with standing, weight bearing, navigating stairs, gait, and functional ability.  Pt will benefit from skilled physical therapy to address functional limitations noted in evaluation and meet patient goals.   Barriers to therapy: Arabic Speaking.  R unicompartmental TKA on 24.  Understanding of Dx/Px/POC: good     Prognosis: good    Goals  ST. Pt will be independent with HEP.  2. Pt will improve R knee flexion AROM to 120 degrees.   3. Pt will improve b/l quad activation to WNL.  LT. Pt will improve pain at end of work day to 2/10.  2. Pt will improve BLE strength grossly by 2 grades or more to improve functional ability.  3. Pt will improve R knee AROM to WNL compared to LLE.     Plan  Patient would benefit from: PT eval and skilled physical therapy  Planned modality interventions: biofeedback, manual electrical stimulation, microcurrent electrical  stimulation, TENS, electrical stimulation/Russian stimulation, thermotherapy: hydrocollator packs, cryotherapy and unattended electrical stimulation    Planned therapy interventions: abdominal trunk stabilization, joint mobilization, manual therapy, massage, ADL retraining, neuromuscular re-education, body mechanics training, patient education, postural training, strengthening, stretching, therapeutic activities, therapeutic exercise, flexibility, functional ROM exercises and home exercise program    Frequency: 1x week  Duration in weeks: 6  Treatment plan discussed with: patient  Plan details: Once per week due to pt having transportation issues and high copay.         Subjective Evaluation    History of Present Illness  Mechanism of injury: Pt presents to the clinic with history of  R knee unicompartmental TKA on 24. Pt participated in formal physical therapy throughout 2025 but noted that her main complaint right now is numbness on the knee and swelling. Pt stated that she has swelling from her knee down to her ankle that gets worse when she is on her feet. Pt stated that she also does not have any pain or difficulty performing any of her normal ADLs or work duties.   Patient Goals  Patient goals for therapy: increased motion, improved balance, increased strength, return to sport/leisure activities, decreased pain, decreased edema, independence with ADLs/IADLs and return to work    Pain  Current pain ratin  At best pain ratin  At worst pain ratin  Quality: discomfort, dull ache, throbbing, tight and pressure          Objective     Active Range of Motion     Right Hip   Flexion: with pain  Left Knee   Flexion: 130 degrees   Extension: 0 degrees     Right Knee   Flexion: 110 degrees   Extension: 4 degrees     Strength/Myotome Testing     Left Hip   Planes of Motion   Flexion: 4-  Abduction: 4-    Right Hip   Planes of Motion   Flexion: 4+  Abduction: 4    Left Knee   Flexion:  "4-  Extension: 4  Quadriceps contraction: fair    Right Knee   Flexion: 4  Extension: 5  Quadriceps contraction: fair    General Comments:      Knee Comments  Poor squat mechanics.  R SLS: 3 seconds.  L SLS: 2 seconds.              Precautions: 11/13/24 R knee unicompartmental TKA, Malian speaking     Date 4/21            Visit # 1            FOTO IE             Re-eval IE              Manuals 4/21            R quad/HF str sup                                                    Neuro Re-Ed 4/21            Quad sets 10x3\" R            clamshells 10x3\" R GTB s/l            bridges             SAQ                                                    Ther Ex 4/21            bike             Heel slides 5x10\" R            SLR 10x ea w QS            Standing hip abd/ext             Leg press             HS curls stand             marching             sidestepping                          Ther Activity 4/21            Squats  10x w UE sup            Step ups             Gait Training 4/21                                      Modalities 4/21            ice prn                              "

## 2025-04-21 NOTE — HOME EXERCISE EDUCATION
Program_ID:[id number]   Access Code: [email or web address]  URL: [email or web address]  Date: 04-  Prepared By: Wesley Ryan    Program Notes      Exercises      - Supine Quad Set - 1 x daily - 7 x weekly - 2 sets - 10 reps - 5 hold      - Supine Heel Slide with Strap - 1 x daily - 7 x weekly - 2 sets - 10 reps - 5 hold      - Active Straight Leg Raise with Quad Set - 1 x daily - 7 x weekly - 2 sets - 10 reps      - Clamshell with Resistance - 1 x daily - 7 x weekly - 2 sets - 10 reps - 3 hold      - Mini Squat with Counter Support - 1 x daily - 7 x weekly - 2 sets - 10 reps

## 2025-04-28 ENCOUNTER — OFFICE VISIT (OUTPATIENT)
Dept: PHYSICAL THERAPY | Facility: CLINIC | Age: 75
End: 2025-04-28
Attending: STUDENT IN AN ORGANIZED HEALTH CARE EDUCATION/TRAINING PROGRAM
Payer: COMMERCIAL

## 2025-04-28 DIAGNOSIS — Z96.651 STATUS POST TOTAL RIGHT KNEE REPLACEMENT: Primary | ICD-10-CM

## 2025-04-28 PROCEDURE — 97140 MANUAL THERAPY 1/> REGIONS: CPT

## 2025-04-28 PROCEDURE — 97110 THERAPEUTIC EXERCISES: CPT

## 2025-04-28 PROCEDURE — 97112 NEUROMUSCULAR REEDUCATION: CPT

## 2025-04-28 NOTE — PROGRESS NOTES
"Daily Note     Today's date: 2025  Patient name: Shraddha Frazier  : 1950  MRN: [id number]  Referring provider: Danny Cardenas MD  Dx:   Encounter Diagnosis     ICD-10-CM    1. Status post total right knee replacement  Z96.651           Start Time: 953  Stop Time: 1033  Total time in clinic (min): 40 minutes    Subjective: Pt stated that she is doing ok today with no new complaints of pain to report at start of session.       Objective: See treatment diary below      Assessment: Pt tolerated warm up on bike well at beginning of session without increase in discomfort during or after activity. Pt tolerated manual R quad and HF stretching with overpressure into flexion fairly with reported decrease in stiffness post manual treatment but did have mild discomfort when going towards available end range flexion ROM. Pt was challenged appropriately with progression of resistance with knee extension and quad activation activities with mild fatigue post performance. Pt required mild verbal cueing to perform squats with proper form, adapted to instruction fairly. Pt would benefit from continued skilled PT to improve RLE strength, mobility, flexibility, ROM, balance, and functional ability.     Plan: Continue per plan of care.  Progress treatment as tolerated.       Precautions: 24 R knee unicompartmental TKA, Indonesian speaking     Date            Visit # 1 2           FOTO IE             Re-eval IE              Manuals            R quad/HF str sup  DK                                                  Neuro Re-Ed            Quad sets 10x3\" R            clamshells 10x3\" R GTB s/l 20x3\" R GTB s/l           bridges  2x10 3\"           SAQ  2x10 3\" 2# R           SLS                                       Ther Ex            bike  6'           Heel slides 5x10\" R 15x5\" R           SLR 10x ea w QS 2x10 ea w QS 2#           Standing hip abd/ext             Leg press  2x10 " 105# BLE            HS curls stand             marching             sidestepping                          Ther Activity 4/21 4/28           Squats  10x w UE sup 2x10 w UE sup           Step ups             Gait Training 4/21                                      Modalities 4/21            ice prn

## 2025-04-29 DIAGNOSIS — G89.29 CHRONIC PAIN OF LEFT KNEE: ICD-10-CM

## 2025-04-29 DIAGNOSIS — M25.562 CHRONIC PAIN OF LEFT KNEE: ICD-10-CM

## 2025-05-05 ENCOUNTER — OFFICE VISIT (OUTPATIENT)
Dept: PHYSICAL THERAPY | Facility: CLINIC | Age: 75
End: 2025-05-05
Attending: STUDENT IN AN ORGANIZED HEALTH CARE EDUCATION/TRAINING PROGRAM
Payer: COMMERCIAL

## 2025-05-05 DIAGNOSIS — Z96.651 STATUS POST TOTAL RIGHT KNEE REPLACEMENT: Primary | ICD-10-CM

## 2025-05-05 PROCEDURE — 97110 THERAPEUTIC EXERCISES: CPT

## 2025-05-05 PROCEDURE — 97112 NEUROMUSCULAR REEDUCATION: CPT

## 2025-05-05 NOTE — PROGRESS NOTES
"Daily Note     Today's date: 2025  Patient name: Shraddha Frazier  : 1950  MRN: [id number]  Referring provider: Danny Cardenas MD  Dx:   Encounter Diagnosis     ICD-10-CM    1. Status post total right knee replacement  Z96.651           Start Time: 955  Stop Time: 1040  Total time in clinic (min): 45 minutes    Subjective: Pt stated that she is doing well today and has less pain overall.       Objective: See treatment diary below      Assessment: Pt tolerated warm up on bike well at beginning of session without increase in discomfort during or after activity. Pt was challenged appropriately with progression of resistance with SLR and SAQ exercise without increase in discomfort during performance. Pt showed good form with squats and step up activity, progress height of step ups as tolerated. Pt would benefit from continued skilled PT to improve RLE strength, mobility, flexibility, quad activation, and functional ability.     Plan: Continue per plan of care.  Progress treatment as tolerated.       Precautions: 24 R knee unicompartmental TKA, Persian speaking     Date           Visit # 1 2 3          FOTO IE             Re-eval IE              Manuals           R quad/HF str sup  DK                                                  Neuro Re-Ed           Quad sets 10x3\" R            clamshells 10x3\" R GTB s/l 20x3\" R GTB s/l 20x3\" R GTB s/l          bridges  2x10 3\" 2x10 3\"          SAQ  2x10 3\" 2# R 2x10 3\" 2.5# R          SLS                                       Ther Ex           bike  6' 6'          Heel slides 5x10\" R 15x5\" R 15x5\" R          SLR 10x ea w QS 2x10 ea w QS 2# 2x10 ea w QS 2.5#           Standing hip abd/ext             Leg press  2x10 105# BLE  2x15 115# BLE          HS curls stand             marching             sidestepping                          Ther Activity  5          Squats  10x w UE sup 2x10 w UE sup " 2x10 w UE          Step ups   2x10 R 1R          Gait Training 4/21                                      Modalities 4/21            ice prn

## 2025-05-12 ENCOUNTER — OFFICE VISIT (OUTPATIENT)
Dept: PHYSICAL THERAPY | Facility: CLINIC | Age: 75
End: 2025-05-12
Attending: STUDENT IN AN ORGANIZED HEALTH CARE EDUCATION/TRAINING PROGRAM
Payer: COMMERCIAL

## 2025-05-12 DIAGNOSIS — Z96.651 STATUS POST TOTAL RIGHT KNEE REPLACEMENT: Primary | ICD-10-CM

## 2025-05-12 PROCEDURE — 97110 THERAPEUTIC EXERCISES: CPT

## 2025-05-12 PROCEDURE — 97112 NEUROMUSCULAR REEDUCATION: CPT

## 2025-05-12 NOTE — PROGRESS NOTES
"Daily Note     Today's date: 2025  Patient name: Shraddha Frazier  : 1950  MRN: [id number]  Referring provider: Danny Cardenas MD  Dx:   Encounter Diagnosis     ICD-10-CM    1. Status post total right knee replacement  Z96.651           Start Time: 1000  Stop Time: 1040  Total time in clinic (min): 40 minutes    Subjective: Pt stated that she is doing well today with no new complaints of pain or discomfort to report since last visit.       Objective: See treatment diary below      Assessment: Pt tolerated warm up on bike well at beginning of session without increase in discomfort during or after activity. Pt continues to be challenged appropriately with progression of resistance on leg press machine without increase in pain or discomfort. Pt had mild difficulty with progression of height on step during step up exercise but was able to complete full sets of repetitions. Pt performed trial of BLE strengthening activities with TB resistance well with mild fatigue and soreness post performance. Pt would benefit from continued skilled PT to improve RLE strength, mobility, flexibility, balance, gait, and functional ability.     Plan: Continue per plan of care.  Progress treatment as tolerated.       Precautions: 24 R knee unicompartmental TKA, Uzbek speaking     Date          Visit # 1 2 3 4         FOTO IE             Re-eval IE              Manuals          R quad/HF str sup  DK                                                  Neuro Re-Ed          Quad sets 10x3\" R            clamshells 10x3\" R GTB s/l 20x3\" R GTB s/l 20x3\" R GTB s/l 20x3\" R GTB s/l         bridges  2x10 3\" 2x10 3\" 2x10 3\"         SAQ  2x10 3\" 2# R 2x10 3\" 2.5# R 2x10 3\" 2.5# R         SLS                                       Ther Ex          bike  6' 6' 6'         Heel slides 5x10\" R 15x5\" R 15x5\" R          SLR 10x ea w QS 2x10 ea w QS 2# 2x10 ea w " QS 2.5#  2x10 ea w QS 2.5#         Standing hip abd/ext    2x10 PTB ea         Leg press  2x10 105# BLE  2x15 115# BLE 2x15 125# BLE         HS curls stand             marching    2x10 PTB ea         sidestepping                          Ther Activity 4/21 4/28 5/5 5/12         Squats  10x w UE sup 2x10 w UE sup 2x10 w UE 2x10 w UE         Step ups   2x10 R 1R 2x10 R 2R         Gait Training 4/21                                      Modalities 4/21            ice prn

## 2025-05-19 ENCOUNTER — OFFICE VISIT (OUTPATIENT)
Dept: PHYSICAL THERAPY | Facility: CLINIC | Age: 75
End: 2025-05-19
Attending: STUDENT IN AN ORGANIZED HEALTH CARE EDUCATION/TRAINING PROGRAM
Payer: COMMERCIAL

## 2025-05-19 DIAGNOSIS — Z96.651 STATUS POST TOTAL RIGHT KNEE REPLACEMENT: Primary | ICD-10-CM

## 2025-05-19 PROCEDURE — 97112 NEUROMUSCULAR REEDUCATION: CPT

## 2025-05-19 PROCEDURE — 97110 THERAPEUTIC EXERCISES: CPT

## 2025-05-19 NOTE — PROGRESS NOTES
"Daily Note     Today's date: 2025  Patient name: Shraddha Frazier  : 1950  MRN: [id number]  Referring provider: Danny Cardenas MD  Dx:   Encounter Diagnosis     ICD-10-CM    1. Status post total right knee replacement  Z96.651           Start Time: 0950  Stop Time: 1040  Total time in clinic (min): 50 minutes    Subjective: Pt stated that PT has been helping her but does still have some pain when doing ADLs at home, that improve after she moves her legs around for a little while.       Objective: See treatment diary below      Assessment: Pt tolerated warm up on bike well at beginning of session without increase in discomfort during or after activity. Pt required mild verbal cueing to perform quad sets with proper form, pt adapted to instruction well. Pt was challenged appropriately with progression of knee extension and RLE strengthening activities on therapy today with mild fatigue and soreness post performance. Pt was challenged with addition of BLE strengthening activities with TB resistance and weight with hamstrings curl, continue to educate pt on proper motions with HS strengthening activity in standing. Pt would benefit from continued skilled PT to improve BLE strength, mobility, flexibility, gait, balance, endurance, and functional ability.     Plan: Continue per plan of care.  Progress treatment as tolerated.       Precautions: 24 R knee unicompartmental TKA, Bruneian speaking     Date         Visit # 1 2 3 4 5        FOTO IE             Re-eval IE              Manuals         R quad/HF str sup  DK                                                  Neuro Re-Ed         Quad sets 10x3\" R    20x5\"        clamshells 10x3\" R GTB s/l 20x3\" R GTB s/l 20x3\" R GTB s/l 20x3\" R GTB s/l         bridges  2x10 3\" 2x10 3\" 2x10 3\" 20x5\"        LAQ     20x3\" 3# R        SAQ  2x10 3\" 2# R 2x10 3\" 2.5# R 2x10 3\" 2.5# R 2x10 3\" 3# R " "       SLS                                       Ther Ex 4/21 4/28 5/5 5/12 5/19        bike  6' 6' 6' 6'        Heel slides 5x10\" R 15x5\" R 15x5\" R          SLR 10x ea w QS 2x10 ea w QS 2# 2x10 ea w QS 2.5#  2x10 ea w QS 2.5# 2x10 3# R w QS        S/l hip abd     2x10 3# R        Standing hip abd/ext    2x10 PTB ea 2x10 PTB ea        Leg press  2x10 105# BLE  2x15 115# BLE 2x15 125# BLE 2x15 125# BLE        HS curls stand     20x3\" R 3#        marching    2x10 PTB ea 2x10 PTB ea        sidestepping     3 laps PTB                     Ther Activity 4/21 4/28 5/5 5/12 5/19        Squats  10x w UE sup 2x10 w UE sup 2x10 w UE 2x10 w UE 2x10 w UE         Step ups   2x10 R 1R 2x10 R 2R         Gait Training 4/21                                      Modalities 4/21            ice prn                                    "

## 2025-05-28 ENCOUNTER — APPOINTMENT (OUTPATIENT)
Dept: PHYSICAL THERAPY | Facility: CLINIC | Age: 75
End: 2025-05-28
Attending: STUDENT IN AN ORGANIZED HEALTH CARE EDUCATION/TRAINING PROGRAM
Payer: COMMERCIAL

## 2025-06-02 ENCOUNTER — OFFICE VISIT (OUTPATIENT)
Dept: PHYSICAL THERAPY | Facility: CLINIC | Age: 75
End: 2025-06-02
Attending: STUDENT IN AN ORGANIZED HEALTH CARE EDUCATION/TRAINING PROGRAM
Payer: COMMERCIAL

## 2025-06-02 DIAGNOSIS — Z96.651 STATUS POST TOTAL RIGHT KNEE REPLACEMENT: Primary | ICD-10-CM

## 2025-06-02 PROCEDURE — 97110 THERAPEUTIC EXERCISES: CPT

## 2025-06-02 PROCEDURE — 97112 NEUROMUSCULAR REEDUCATION: CPT

## 2025-06-02 NOTE — PROGRESS NOTES
"Daily Note     Today's date: 2025  Patient name: Shraddha Frazier  : 1950  MRN: [id number]  Referring provider: Danny Cardenas MD  Dx:   Encounter Diagnosis     ICD-10-CM    1. Status post total right knee replacement  Z96.651                      Subjective: No complaints offered.       Objective: See treatment diary below      Assessment: Challenged with maintaining full knee extension with resisted SLR flexion.  Pt would benefit from continued skilled PT to improve BLE strength, mobility, flexibility, gait, balance, endurance, and functional ability.     Plan: Continue per plan of care.  Progress treatment as tolerated.       Precautions: 24 R knee unicompartmental TKA, Khmer speaking     Date        Visit # 1 2 3 4 5 6       FOTO IE             Re-eval IE              Manuals  6       R quad/HF str sup  DK                                                  Neuro Re-Ed  6/2       Quad sets 10x3\" R    20x5\" 20x5\"       clamshells 10x3\" R GTB s/l 20x3\" R GTB s/l 20x3\" R GTB s/l 20x3\" R GTB s/l         bridges  2x10 3\" 2x10 3\" 2x10 3\" 20x5\" 20x5\"       LAQ     20x3\" 3# R 20x3\"  3# R       SAQ  2x10 3\" 2# R 2x10 3\" 2.5# R 2x10 3\" 2.5# R 2x10 3\" 3# R 2x10  3\" 3#  R       SLS                                       Ther Ex  5/ 6/2       bike  6' 6' 6' 6' 6'       Heel slides 5x10\" R 15x5\" R 15x5\" R          SLR 10x ea w QS 2x10 ea w QS 2# 2x10 ea w QS 2.5#  2x10 ea w QS 2.5# 2x10 3# R w QS 2x10  3# R w/ QS       S/l hip abd     2x10 3# R 2x10  3# R       Standing hip abd/ext    2x10 PTB ea 2x10 PTB ea 2x10 PTB  ea       Leg press  2x10 105# BLE  2x15 115# BLE 2x15 125# BLE 2x15 125# BLE 2x15   125#  BLE       HS curls stand     20x3\" R 3# 20x3\"  R 3#       marching    2x10 PTB ea 2x10 PTB ea 2x10  PTB  ea       sidestepping     3 laps PTB 3 laps  PTB                    Ther Activity  " 5/12 5/19 6/2       Squats  10x w UE sup 2x10 w UE sup 2x10 w UE 2x10 w UE 2x10 w UE  2x10  W UE       Step ups   2x10 R 1R 2x10 R 2R         Gait Training 4/21                                      Modalities 4/21            ice prn

## 2025-06-09 ENCOUNTER — OFFICE VISIT (OUTPATIENT)
Dept: PHYSICAL THERAPY | Facility: CLINIC | Age: 75
End: 2025-06-09
Attending: STUDENT IN AN ORGANIZED HEALTH CARE EDUCATION/TRAINING PROGRAM
Payer: COMMERCIAL

## 2025-06-09 DIAGNOSIS — Z96.651 STATUS POST TOTAL RIGHT KNEE REPLACEMENT: Primary | ICD-10-CM

## 2025-06-09 PROCEDURE — 97112 NEUROMUSCULAR REEDUCATION: CPT

## 2025-06-09 PROCEDURE — 97110 THERAPEUTIC EXERCISES: CPT

## 2025-06-09 NOTE — PROGRESS NOTES
"Daily Note     Today's date: 2025  Patient name: Shraddha Frazier  : 1950  MRN: [id number]  Referring provider: Danny Cardenas MD  Dx:   Encounter Diagnosis     ICD-10-CM    1. Status post total right knee replacement  Z96.651           Start Time: 1052  Stop Time: 1130  Total time in clinic (min): 38 minutes    Subjective: Pt stated that she is doing well today with no new complaints of pain or discomfort.       Objective: See treatment diary below      Assessment: Pt tolerated warm up on bike well at beginning of session without increase in discomfort during or after activity. Pt was challenged appropriately with progression of standing RLE strengthening activities with TB resistance with mild fatigue post performance. Pt had mild fatigue and soreness post performance of SLR with resistance. Discussed current PT POC with pt, pt stated that she is doing very well at this point and would like to discuss her knee with her MD before scheduling more appointments if they are necessary, agreed with pt. Pt would benefit from continued skilled PT to improve RLE strength, mobility, flexibility, and functional ability.     Plan: Continue per plan of care.  Progress treatment as tolerated.       Precautions: 24 R knee unicompartmental TKA, Telugu speaking     Date       Visit # 1 2 3 4 5 6 7      FOTO IE             Re-eval IE              Manuals /      R quad/HF str sup  DK                                                  Neuro Re-Ed       Quad sets 10x3\" R    20x5\" 20x5\" 20x5\"      clamshells 10x3\" R GTB s/l 20x3\" R GTB s/l 20x3\" R GTB s/l 20x3\" R GTB s/l         bridges  2x10 3\" 2x10 3\" 2x10 3\" 20x5\" 20x5\" 20x5\"      LAQ     20x3\" 3# R 20x3\"  3# R 20x3\" 3# R      SAQ  2x10 3\" 2# R 2x10 3\" 2.5# R 2x10 3\" 2.5# R 2x10 3\" 3# R 2x10  3\" 3#  R       SLS                                       Ther Ex  " "5/5 5/12 5/19 6/2 6/9      bike  6' 6' 6' 6' 6' 6'      Heel slides 5x10\" R 15x5\" R 15x5\" R          SLR 10x ea w QS 2x10 ea w QS 2# 2x10 ea w QS 2.5#  2x10 ea w QS 2.5# 2x10 3# R w QS 2x10  3# R w/ QS 2x10 3# R w QS      S/l hip abd     2x10 3# R 2x10  3# R       Standing hip abd/ext    2x10 PTB ea 2x10 PTB ea 2x10 PTB  ea 2x10 GTB ea      Leg press  2x10 105# BLE  2x15 115# BLE 2x15 125# BLE 2x15 125# BLE 2x15   125#  BLE 2x15 125# BLE       HS curls stand     20x3\" R 3# 20x3\"  R 3# 20x3\" R 3#      marching    2x10 PTB ea 2x10 PTB ea 2x10  PTB  ea 2x10 GTB ea      sidestepping     3 laps PTB 3 laps  PTB 4 laps GTB                    Ther Activity 4/21 4/28 5/5 5/12 5/19 6/2 6/9      Squats  10x w UE sup 2x10 w UE sup 2x10 w UE 2x10 w UE 2x10 w UE  2x10  W UE 2x10 w UE      Step ups   2x10 R 1R 2x10 R 2R         Gait Training 4/21                                      Modalities 4/21            ice prn                                      "

## 2025-07-09 ENCOUNTER — TELEPHONE (OUTPATIENT)
Dept: FAMILY MEDICINE CLINIC | Facility: CLINIC | Age: 75
End: 2025-07-09

## 2025-07-09 NOTE — TELEPHONE ENCOUNTER
"Good morning Abdirashid    Pt contacted office in regards to letting the office know she will no longer be seen at [facility] and by you due to pt moving out of [place]. Pt stated \"thank you for everything, and listening to my needs and concerns, you were great to me and provided good care.\"      Message will be sent to [facility] to have pt removed from pt panel.  "

## 2025-07-11 DIAGNOSIS — I10 ESSENTIAL HYPERTENSION: ICD-10-CM

## 2025-07-11 DIAGNOSIS — E78.2 MIXED HYPERLIPIDEMIA: ICD-10-CM

## 2025-07-11 RX ORDER — ATORVASTATIN CALCIUM 40 MG/1
40 TABLET, FILM COATED ORAL DAILY
Qty: 90 TABLET | Refills: 1 | Status: SHIPPED | OUTPATIENT
Start: 2025-07-11

## 2025-07-11 RX ORDER — AMLODIPINE BESYLATE 10 MG/1
10 TABLET ORAL DAILY
Qty: 90 TABLET | Refills: 3 | Status: SHIPPED | OUTPATIENT
Start: 2025-07-11

## 2025-07-11 RX ORDER — LOSARTAN POTASSIUM 100 MG/1
100 TABLET ORAL DAILY
Qty: 90 TABLET | Refills: 3 | Status: SHIPPED | OUTPATIENT
Start: 2025-07-11

## 2025-07-18 ENCOUNTER — DOCUMENTATION (OUTPATIENT)
Dept: ADMINISTRATIVE | Facility: OTHER | Age: 75
End: 2025-07-18

## 2025-08-04 ENCOUNTER — TELEPHONE (OUTPATIENT)
Dept: OBGYN CLINIC | Facility: HOSPITAL | Age: 75
End: 2025-08-04

## (undated) DEVICE — BETHLEHEM UNIV TOTAL KNEE, KIT: Brand: CARDINAL HEALTH

## (undated) DEVICE — ASTOUND STANDARD SURGICAL GOWN, XL: Brand: CONVERTORS

## (undated) DEVICE — NEEDLE SPINAL18G X 3.5 IN QUINCKE

## (undated) DEVICE — SHORT THREADED PINS PACK: Brand: KNEE INSTRUMENTS

## (undated) DEVICE — WEBRIL 6 IN UNSTERILE

## (undated) DEVICE — HANDPIECE SET WITH RETRACTABLE COAXIAL FAN SPRAY TIP AND SUCTION TUBE: Brand: INTERPULSE

## (undated) DEVICE — ADHESIVE SKIN CLOSURE SYS EXOFIN FUSION 22CM

## (undated) DEVICE — DRILL PINS L85 PACK: Brand: GMK EFFICIENCY

## (undated) DEVICE — 450 ML BOTTLE OF 0.05% CHLORHEXIDINE GLUCONATE IN 99.95% STERILE WATER FOR IRRIGATION, USP AND APPLICATOR.: Brand: IRRISEPT ANTIMICROBIAL WOUND LAVAGE

## (undated) DEVICE — SUT STRATAFIX SPIRAL PDS PLUS 1 CTX 18 IN SXPP1A400

## (undated) DEVICE — DRESSING MEPILEX AG BORDER POST-OP 4 X 10 IN

## (undated) DEVICE — HOOD WITH PEEL AWAY FACE SHIELD: Brand: T7PLUS

## (undated) DEVICE — SYRINGE 50ML LL

## (undated) DEVICE — SAW BLADE RECIPROCATING SINGLE SIDED 258 ORTHO

## (undated) DEVICE — VEST SURGEON DISPOSABLE

## (undated) DEVICE — DRESSING MEPILEX AG BORDER POST-OP 4 X 12 IN

## (undated) DEVICE — PREP SURGICAL PURPREP 26ML

## (undated) DEVICE — GLOVE INDICATOR PI UNDERGLOVE SZ 8 BLUE

## (undated) DEVICE — DECANTER: Brand: UNBRANDED

## (undated) DEVICE — DRAPE EQUIPMENT RF WAND

## (undated) DEVICE — SCREW L 20 PACK: Brand: GMK EFFICIENCY

## (undated) DEVICE — SUT VICRYL PLUS 1 CTB-1 36 IN VCPB947H

## (undated) DEVICE — HOOD: Brand: T7PLUS

## (undated) DEVICE — GLOVE SRG BIOGEL 7.5

## (undated) DEVICE — ACE WRAP 6 IN UNSTERILE

## (undated) DEVICE — SCREW L15 PACK: Brand: GMK EFFICIENCY

## (undated) DEVICE — IMPERVIOUS STOCKINETTE: Brand: DEROYAL

## (undated) DEVICE — BLADE OSCILLATING SAW 1/2

## (undated) DEVICE — EXOFIN FUSION SKIN CLOSURE SYSTEM, 30CM: Brand: EXOFIN FUSION SKIN CLOSURE SYSTEM, 30CM

## (undated) DEVICE — CEMENT MIXING SYSTEM WITH FEMORAL BREAKWAY NOZZLE: Brand: REVOLUTION

## (undated) DEVICE — SPECIMEN CONTAINER STERILE PEEL PACK

## (undated) DEVICE — SUT STRATAFIX SPIRAL PLUS 3-0 PS-2 30 X 30 CM SXMP2B408

## (undated) DEVICE — PLUMEPEN PRO 10FT

## (undated) DEVICE — SUT VICRYL 2-0 CT-1 36 IN J945H